# Patient Record
Sex: FEMALE | Race: WHITE | NOT HISPANIC OR LATINO | Employment: UNEMPLOYED | URBAN - METROPOLITAN AREA
[De-identification: names, ages, dates, MRNs, and addresses within clinical notes are randomized per-mention and may not be internally consistent; named-entity substitution may affect disease eponyms.]

---

## 2017-08-01 ENCOUNTER — LAB (OUTPATIENT)
Dept: LAB | Facility: HOSPITAL | Age: 22
End: 2017-08-01

## 2017-08-01 ENCOUNTER — TRANSCRIBE ORDERS (OUTPATIENT)
Dept: ADMINISTRATIVE | Facility: HOSPITAL | Age: 22
End: 2017-08-01

## 2017-08-01 DIAGNOSIS — J45.909 INTRINSIC ASTHMA, UNSPECIFIED: ICD-10-CM

## 2017-08-01 DIAGNOSIS — J45.20 ASTHMA, MILD INTERMITTENT, POORLY CONTROLLED: Primary | ICD-10-CM

## 2017-08-01 DIAGNOSIS — J45.20 ASTHMA, MILD INTERMITTENT, POORLY CONTROLLED: ICD-10-CM

## 2017-08-01 LAB
ANION GAP SERPL CALCULATED.3IONS-SCNC: 16.6 MMOL/L
BUN BLD-MCNC: 18 MG/DL (ref 6–20)
BUN/CREAT SERPL: 20.9 (ref 7–25)
CALCIUM SPEC-SCNC: 9.7 MG/DL (ref 8.6–10.5)
CHLORIDE SERPL-SCNC: 104 MMOL/L (ref 98–107)
CO2 SERPL-SCNC: 25.4 MMOL/L (ref 22–29)
CREAT BLD-MCNC: 0.86 MG/DL (ref 0.57–1)
GFR SERPL CREATININE-BSD FRML MDRD: 82 ML/MIN/1.73
GLUCOSE BLD-MCNC: 125 MG/DL (ref 65–99)
POTASSIUM BLD-SCNC: 4.3 MMOL/L (ref 3.5–5.2)
SODIUM BLD-SCNC: 146 MMOL/L (ref 136–145)
VALPROATE SERPL-MCNC: 46 MCG/ML (ref 50–125)

## 2017-08-01 PROCEDURE — 36415 COLL VENOUS BLD VENIPUNCTURE: CPT

## 2017-08-01 PROCEDURE — 80164 ASSAY DIPROPYLACETIC ACD TOT: CPT | Performed by: INTERNAL MEDICINE

## 2017-08-01 PROCEDURE — 80048 BASIC METABOLIC PNL TOTAL CA: CPT | Performed by: INTERNAL MEDICINE

## 2017-11-14 ENCOUNTER — LAB (OUTPATIENT)
Dept: LAB | Facility: HOSPITAL | Age: 22
End: 2017-11-14

## 2017-11-14 ENCOUNTER — APPOINTMENT (OUTPATIENT)
Dept: LAB | Facility: HOSPITAL | Age: 22
End: 2017-11-14

## 2017-11-14 ENCOUNTER — TRANSCRIBE ORDERS (OUTPATIENT)
Dept: ADMINISTRATIVE | Facility: HOSPITAL | Age: 22
End: 2017-11-14

## 2017-11-14 DIAGNOSIS — I11.0 BENIGN HYPERTENSIVE HEART DISEASE WITH CONGESTIVE HEART FAILURE (HCC): Primary | ICD-10-CM

## 2017-11-14 DIAGNOSIS — R00.0 TACHYCARDIA: ICD-10-CM

## 2017-11-14 DIAGNOSIS — I11.0 BENIGN HYPERTENSIVE HEART DISEASE WITH CONGESTIVE HEART FAILURE (HCC): ICD-10-CM

## 2017-11-14 LAB
D DIMER PPP FEU-MCNC: <0.27 MCGFEU/ML (ref 0–0.49)
NT-PROBNP SERPL-MCNC: 35.2 PG/ML (ref 5–450)

## 2017-11-14 PROCEDURE — 85379 FIBRIN DEGRADATION QUANT: CPT | Performed by: INTERNAL MEDICINE

## 2017-11-14 PROCEDURE — 36415 COLL VENOUS BLD VENIPUNCTURE: CPT

## 2017-11-14 PROCEDURE — 83880 ASSAY OF NATRIURETIC PEPTIDE: CPT | Performed by: INTERNAL MEDICINE

## 2018-10-23 ENCOUNTER — LAB (OUTPATIENT)
Dept: LAB | Facility: HOSPITAL | Age: 23
End: 2018-10-23

## 2018-10-23 ENCOUNTER — TRANSCRIBE ORDERS (OUTPATIENT)
Dept: ADMINISTRATIVE | Facility: HOSPITAL | Age: 23
End: 2018-10-23

## 2018-10-23 DIAGNOSIS — D64.9 ANEMIA, UNSPECIFIED TYPE: ICD-10-CM

## 2018-10-23 DIAGNOSIS — C03.9 MALIGNANT NEOPLASM OF GUM (HCC): ICD-10-CM

## 2018-10-23 DIAGNOSIS — D64.9 ANEMIA, UNSPECIFIED TYPE: Primary | ICD-10-CM

## 2018-10-23 LAB
BASOPHILS # BLD AUTO: 0.06 10*3/MM3 (ref 0–0.2)
BASOPHILS NFR BLD AUTO: 1 % (ref 0–1.5)
DEPRECATED RDW RBC AUTO: 39 FL (ref 37–54)
EOSINOPHIL # BLD AUTO: 0.02 10*3/MM3 (ref 0–0.7)
EOSINOPHIL NFR BLD AUTO: 0.3 % (ref 0.3–6.2)
ERYTHROCYTE [DISTWIDTH] IN BLOOD BY AUTOMATED COUNT: 12 % (ref 11.7–13)
FERRITIN SERPL-MCNC: 37.21 NG/ML (ref 13–150)
FOLATE SERPL-MCNC: >20 NG/ML (ref 4.78–24.2)
HCT VFR BLD AUTO: 45.3 % (ref 35.6–45.5)
HGB BLD-MCNC: 15.5 G/DL (ref 11.9–15.5)
IMM GRANULOCYTES # BLD: 0.01 10*3/MM3 (ref 0–0.03)
IMM GRANULOCYTES NFR BLD: 0.2 % (ref 0–0.5)
IRON 24H UR-MRATE: 80 MCG/DL (ref 37–145)
LYMPHOCYTES # BLD AUTO: 2.17 10*3/MM3 (ref 0.9–4.8)
LYMPHOCYTES NFR BLD AUTO: 35.2 % (ref 19.6–45.3)
MCH RBC QN AUTO: 30.3 PG (ref 26.9–32)
MCHC RBC AUTO-ENTMCNC: 34.2 G/DL (ref 32.4–36.3)
MCV RBC AUTO: 88.5 FL (ref 80.5–98.2)
MONOCYTES # BLD AUTO: 0.59 10*3/MM3 (ref 0.2–1.2)
MONOCYTES NFR BLD AUTO: 9.6 % (ref 5–12)
NEUTROPHILS # BLD AUTO: 3.32 10*3/MM3 (ref 1.9–8.1)
NEUTROPHILS NFR BLD AUTO: 53.9 % (ref 42.7–76)
PLATELET # BLD AUTO: 290 10*3/MM3 (ref 140–500)
PMV BLD AUTO: 11.8 FL (ref 6–12)
RBC # BLD AUTO: 5.12 10*6/MM3 (ref 3.9–5.2)
T3 SERPL-MCNC: 114.7 NG/DL (ref 80–200)
T4 FREE SERPL-MCNC: 1.76 NG/DL (ref 0.93–1.7)
TSH SERPL DL<=0.05 MIU/L-ACNC: 0.84 MIU/ML (ref 0.27–4.2)
VIT B12 BLD-MCNC: 384 PG/ML (ref 211–946)
WBC NRBC COR # BLD: 6.16 10*3/MM3 (ref 4.5–10.7)

## 2018-10-23 PROCEDURE — 84443 ASSAY THYROID STIM HORMONE: CPT | Performed by: INTERNAL MEDICINE

## 2018-10-23 PROCEDURE — 84480 ASSAY TRIIODOTHYRONINE (T3): CPT | Performed by: INTERNAL MEDICINE

## 2018-10-23 PROCEDURE — 82607 VITAMIN B-12: CPT | Performed by: INTERNAL MEDICINE

## 2018-10-23 PROCEDURE — 82746 ASSAY OF FOLIC ACID SERUM: CPT | Performed by: INTERNAL MEDICINE

## 2018-10-23 PROCEDURE — 36415 COLL VENOUS BLD VENIPUNCTURE: CPT

## 2018-10-23 PROCEDURE — 85025 COMPLETE CBC W/AUTO DIFF WBC: CPT | Performed by: INTERNAL MEDICINE

## 2018-10-23 PROCEDURE — 83540 ASSAY OF IRON: CPT | Performed by: INTERNAL MEDICINE

## 2018-10-23 PROCEDURE — 82728 ASSAY OF FERRITIN: CPT | Performed by: INTERNAL MEDICINE

## 2018-10-23 PROCEDURE — 84439 ASSAY OF FREE THYROXINE: CPT | Performed by: INTERNAL MEDICINE

## 2023-10-08 ENCOUNTER — HOSPITAL ENCOUNTER (EMERGENCY)
Facility: HOSPITAL | Age: 28
Discharge: HOME | End: 2023-10-09
Attending: EMERGENCY MEDICINE | Admitting: EMERGENCY MEDICINE
Payer: COMMERCIAL

## 2023-10-08 DIAGNOSIS — F41.8 ANXIETY ABOUT HEALTH: Primary | ICD-10-CM

## 2023-10-08 PROCEDURE — 99282 EMERGENCY DEPT VISIT SF MDM: CPT

## 2023-10-09 NOTE — ED NOTES
Dr. Hankins entered the room to examine the patient. She started to act weird after he did a normal routine checkup. He listened to her lungs and heart, palpated her abdomen, and asked her several questions about her visit here. He stated that we could do a urinalysis or a pelvic exam to get the samples needed to be sent out to test for STD's. That is what the patient requested when I triaged her in the room. She decided that she wanted to go somewhere else to have services provided. She then weirdly asked for the Doctor's name and her explaine to her that it would be on her discharge paperwork.
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
## 2023-11-02 ENCOUNTER — APPOINTMENT (OUTPATIENT)
Dept: CT IMAGING | Facility: HOSPITAL | Age: 28
End: 2023-11-02
Payer: OTHER MISCELLANEOUS

## 2023-11-02 ENCOUNTER — HOSPITAL ENCOUNTER (EMERGENCY)
Facility: HOSPITAL | Age: 28
Discharge: HOME | End: 2023-11-02
Attending: EMERGENCY MEDICINE
Payer: OTHER MISCELLANEOUS

## 2023-11-02 VITALS
OXYGEN SATURATION: 98 % | DIASTOLIC BLOOD PRESSURE: 80 MMHG | TEMPERATURE: 98.8 F | BODY MASS INDEX: 19.99 KG/M2 | SYSTOLIC BLOOD PRESSURE: 136 MMHG | HEART RATE: 81 BPM | RESPIRATION RATE: 16 BRPM | WEIGHT: 120 LBS | HEIGHT: 65 IN

## 2023-11-02 DIAGNOSIS — M54.2 ACUTE NECK PAIN: ICD-10-CM
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
## 2023-11-02 DIAGNOSIS — S09.90XA INJURY OF HEAD, INITIAL ENCOUNTER: ICD-10-CM

## 2023-11-02 PROCEDURE — 99284 EMERGENCY DEPT VISIT MOD MDM: CPT

## 2023-11-02 PROCEDURE — 70450 CT HEAD/BRAIN W/O DYE: CPT

## 2023-11-02 PROCEDURE — 72125 CT NECK SPINE W/O DYE: CPT
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
## 2024-08-30 ENCOUNTER — HOSPITAL ENCOUNTER (EMERGENCY)
Facility: HOSPITAL | Age: 29
Discharge: HOME | End: 2024-08-30
Attending: EMERGENCY MEDICINE
Payer: COMMERCIAL

## 2024-08-30 VITALS
HEIGHT: 65 IN | RESPIRATION RATE: 16 BRPM | TEMPERATURE: 98.4 F | WEIGHT: 115 LBS | HEART RATE: 76 BPM | BODY MASS INDEX: 19.16 KG/M2 | DIASTOLIC BLOOD PRESSURE: 82 MMHG | SYSTOLIC BLOOD PRESSURE: 102 MMHG | OXYGEN SATURATION: 98 %

## 2024-08-30 DIAGNOSIS — Z71.1 CONCERN ABOUT DISEASE WITHOUT DIAGNOSIS: Primary | ICD-10-CM

## 2024-08-30 DIAGNOSIS — Z86.59 HISTORY OF ANXIETY: ICD-10-CM

## 2024-08-30 LAB — GLUCOSE BLDC GLUCOMTR-MCNC: 111 MG/DL (ref 70–130)

## 2024-08-30 PROCEDURE — 82948 REAGENT STRIP/BLOOD GLUCOSE: CPT

## 2024-08-30 PROCEDURE — 99282 EMERGENCY DEPT VISIT SF MDM: CPT

## 2024-08-30 NOTE — ED PROVIDER NOTES
"Subjective   History of Present Illness  Pt is a 30 yo female presenting to ED with complaints of concern for diabetes. PMHx significant for Anxiety and Depression. PT came to ED requesting to be tested for diabetes. She explains \"I don't eat healthy\". She reports she believes she has heart disease and high cholesterol and wants to be tested for those all of those. She denies dizziness, CP, SOB, fever, N/V/D, abdominal pain or urinary sx. She explains she has been off her \"mental\"meds for months. She uses ETOH and marijuana.     History provided by:  Patient and medical records      Review of Systems   Constitutional:  Negative for fever.   HENT:  Negative for congestion.    Eyes:  Negative for visual disturbance.   Respiratory:  Negative for cough and shortness of breath.    Cardiovascular:  Negative for chest pain.   Gastrointestinal:  Negative for abdominal pain, diarrhea, nausea and vomiting.   Genitourinary:  Negative for difficulty urinating, dysuria, flank pain and frequency.   Musculoskeletal:  Negative for arthralgias and back pain.   Neurological:  Negative for dizziness, weakness, numbness and headaches.   Psychiatric/Behavioral:  Negative for confusion.        Past Medical History:   Diagnosis Date    Asthma        No Known Allergies    Past Surgical History:   Procedure Laterality Date    TONSILLECTOMY         No family history on file.    Social History     Socioeconomic History    Marital status: Single   Tobacco Use    Smoking status: Never    Smokeless tobacco: Never   Substance and Sexual Activity    Alcohol use: No           Objective   Physical Exam  Vitals and nursing note reviewed.   Constitutional:       General: She is not in acute distress.  HENT:      Head: Atraumatic.   Eyes:      Extraocular Movements: Extraocular movements intact.      Conjunctiva/sclera: Conjunctivae normal.   Cardiovascular:      Rate and Rhythm: Normal rate.      Heart sounds: Normal heart sounds.   Pulmonary:      " "Effort: Pulmonary effort is normal. No respiratory distress.   Abdominal:      Palpations: Abdomen is soft.      Tenderness: There is no abdominal tenderness.   Musculoskeletal:         General: Normal range of motion.      Cervical back: Normal range of motion and neck supple.   Skin:     General: Skin is warm.   Neurological:      Mental Status: She is alert and oriented to person, place, and time.      Sensory: No sensory deficit.      Motor: No weakness.   Psychiatric:         Mood and Affect: Mood is anxious.         Speech: Speech normal.         Behavior: Behavior is cooperative.         Procedures           ED Course                        Recent Results (from the past 24 hour(s))   POC Glucose Once    Collection Time: 08/30/24  3:53 PM    Specimen: Blood   Result Value Ref Range    Glucose 111 70 - 130 mg/dL     Note: In addition to lab results from this visit, the labs listed above may include labs taken at another facility or during a different encounter within the last 24 hours. Please correlate lab times with ED admission and discharge times for further clarification of the services performed during this visit.    No orders to display     Vitals:    08/30/24 1549   BP: 102/82   BP Location: Left arm   Patient Position: Sitting   Pulse: 76   Resp: 16   Temp: 98.4 °F (36.9 °C)   TempSrc: Oral   SpO2: 98%   Weight: 52.2 kg (115 lb)   Height: 165.1 cm (65\")     Medications - No data to display  ECG/EMG Results (last 24 hours)       ** No results found for the last 24 hours. **          No orders to display       DISCHARGE    Patient discharged in stable condition.    Reviewed implications of results, diagnosis, meds, responsibility to follow up, warning signs and symptoms of possible worsening, potential complications and reasons to return to ER.    Patient/Family voiced understanding of above instructions.    Discussed plan for discharge, as there is no emergent indication for admission.  Pt/family is " agreeable and understands need for follow up and possible repeat testing.  Pt/family is aware that discharge does not mean that nothing is wrong but that it indicates no emergency is currently present that requires admission and they must continue care with follow-up as given below or with a physician of their choice.     FOLLOW-UP  Fawn Peraza APRN  [address] [facility] [address]  [facility] [address]  [phone]    Schedule an appointment as soon as possible for a visit       [facility] EMERGENCY DEPARTMENT  [address] [address] [phone]    If symptoms worsen         Medication List      No changes were made to your prescriptions during this visit.                              Medical Decision Making  Pt is a 28 yo female presenting to ED with complaints of concern for diabetes and heart diseaes. She has no symptoms but states she does not eat healthy. Glucose 111 in ED. Counseled on healthy diet and cessation from ETOH and marijuana use. She needs to f/u with PCP.     DDx  Drug use, DKA, Hyperglycemia, CAD    Problems Addressed:  Concern about disease without diagnosis: acute illness or injury  History of anxiety: acute illness or injury    Amount and/or Complexity of Data Reviewed  External Data Reviewed: notes.     Details: Reviewed previous non ED visits including prior labs, imaging, available notes, medications, allergies and surgical hx.     Labs:  Decision-making details documented in ED Course.        Final diagnoses:   Concern about disease without diagnosis   History of anxiety       ED Disposition  ED Disposition       ED Disposition   Discharge    Condition   Stable    Comment   --               Fawn Peraza APRN  [address] [facility] [address]  [place] [id number] [phone]    Schedule an appointment as soon as possible for a visit       [facility] EMERGENCY DEPARTMENT  [address] [facility]  [id number] [phone]    If symptoms worsen         Medication List      No changes were made to your prescriptions during this visit.            Annie Guillen PA  08/30/24 2021

## 2024-09-18 ENCOUNTER — HOSPITAL ENCOUNTER (EMERGENCY)
Facility: HOSPITAL | Age: 29
Discharge: HOME | End: 2024-09-19
Attending: EMERGENCY MEDICINE
Payer: COMMERCIAL

## 2024-09-18 ENCOUNTER — APPOINTMENT (OUTPATIENT)
Dept: GENERAL RADIOLOGY | Facility: HOSPITAL | Age: 29
End: 2024-09-18
Payer: COMMERCIAL

## 2024-09-18 DIAGNOSIS — R00.2 PALPITATIONS: Primary | ICD-10-CM

## 2024-09-18 LAB
ALBUMIN SERPL-MCNC: 4.6 G/DL (ref 3.5–5.2)
ALBUMIN/GLOB SERPL: 1.8 G/DL
ALP SERPL-CCNC: 56 U/L (ref 39–117)
ALT SERPL W P-5'-P-CCNC: 12 U/L (ref 1–33)
ANION GAP SERPL CALCULATED.3IONS-SCNC: 11 MMOL/L (ref 5–15)
AST SERPL-CCNC: 19 U/L (ref 1–32)
BASOPHILS # BLD AUTO: 0.07 10*3/MM3 (ref 0–0.2)
BASOPHILS NFR BLD AUTO: 0.8 % (ref 0–1.5)
BILIRUB SERPL-MCNC: 0.4 MG/DL (ref 0–1.2)
BUN SERPL-MCNC: 12 MG/DL (ref 6–20)
BUN/CREAT SERPL: 16.2 (ref 7–25)
CALCIUM SPEC-SCNC: 9.7 MG/DL (ref 8.6–10.5)
CHLORIDE SERPL-SCNC: 103 MMOL/L (ref 98–107)
CO2 SERPL-SCNC: 23 MMOL/L (ref 22–29)
CREAT SERPL-MCNC: 0.74 MG/DL (ref 0.57–1)
DEPRECATED RDW RBC AUTO: 38.9 FL (ref 37–54)
EGFRCR SERPLBLD CKD-EPI 2021: 111.8 ML/MIN/1.73
EOSINOPHIL # BLD AUTO: 0.09 10*3/MM3 (ref 0–0.4)
EOSINOPHIL NFR BLD AUTO: 1 % (ref 0.3–6.2)
ERYTHROCYTE [DISTWIDTH] IN BLOOD BY AUTOMATED COUNT: 11.8 % (ref 12.3–15.4)
GLOBULIN UR ELPH-MCNC: 2.5 GM/DL
GLUCOSE SERPL-MCNC: 92 MG/DL (ref 65–99)
HCT VFR BLD AUTO: 43.6 % (ref 34–46.6)
HGB BLD-MCNC: 14.9 G/DL (ref 12–15.9)
HOLD SPECIMEN: NORMAL
IMM GRANULOCYTES # BLD AUTO: 0.02 10*3/MM3 (ref 0–0.05)
IMM GRANULOCYTES NFR BLD AUTO: 0.2 % (ref 0–0.5)
LYMPHOCYTES # BLD AUTO: 2.23 10*3/MM3 (ref 0.7–3.1)
LYMPHOCYTES NFR BLD AUTO: 25.1 % (ref 19.6–45.3)
MCH RBC QN AUTO: 31 PG (ref 26.6–33)
MCHC RBC AUTO-ENTMCNC: 34.2 G/DL (ref 31.5–35.7)
MCV RBC AUTO: 90.8 FL (ref 79–97)
MONOCYTES # BLD AUTO: 0.75 10*3/MM3 (ref 0.1–0.9)
MONOCYTES NFR BLD AUTO: 8.4 % (ref 5–12)
NEUTROPHILS NFR BLD AUTO: 5.72 10*3/MM3 (ref 1.7–7)
NEUTROPHILS NFR BLD AUTO: 64.5 % (ref 42.7–76)
NRBC BLD AUTO-RTO: 0 /100 WBC (ref 0–0.2)
PLATELET # BLD AUTO: 256 10*3/MM3 (ref 140–450)
PMV BLD AUTO: 10.8 FL (ref 6–12)
POTASSIUM SERPL-SCNC: 3.9 MMOL/L (ref 3.5–5.2)
PROT SERPL-MCNC: 7.1 G/DL (ref 6–8.5)
RBC # BLD AUTO: 4.8 10*6/MM3 (ref 3.77–5.28)
SODIUM SERPL-SCNC: 137 MMOL/L (ref 136–145)
TROPONIN T SERPL HS-MCNC: <6 NG/L
WBC NRBC COR # BLD AUTO: 8.88 10*3/MM3 (ref 3.4–10.8)
WHOLE BLOOD HOLD COAG: NORMAL
WHOLE BLOOD HOLD SPECIMEN: NORMAL

## 2024-09-18 PROCEDURE — 93005 ELECTROCARDIOGRAM TRACING: CPT | Performed by: EMERGENCY MEDICINE

## 2024-09-18 PROCEDURE — 93010 ELECTROCARDIOGRAM REPORT: CPT | Performed by: INTERNAL MEDICINE

## 2024-09-18 PROCEDURE — 83735 ASSAY OF MAGNESIUM: CPT | Performed by: PHYSICIAN ASSISTANT

## 2024-09-18 PROCEDURE — 36415 COLL VENOUS BLD VENIPUNCTURE: CPT

## 2024-09-18 PROCEDURE — 85025 COMPLETE CBC W/AUTO DIFF WBC: CPT | Performed by: EMERGENCY MEDICINE

## 2024-09-18 PROCEDURE — 80053 COMPREHEN METABOLIC PANEL: CPT | Performed by: EMERGENCY MEDICINE

## 2024-09-18 PROCEDURE — 99284 EMERGENCY DEPT VISIT MOD MDM: CPT

## 2024-09-18 PROCEDURE — 84484 ASSAY OF TROPONIN QUANT: CPT | Performed by: EMERGENCY MEDICINE

## 2024-09-18 PROCEDURE — 84439 ASSAY OF FREE THYROXINE: CPT | Performed by: PHYSICIAN ASSISTANT

## 2024-09-18 PROCEDURE — 71045 X-RAY EXAM CHEST 1 VIEW: CPT

## 2024-09-18 PROCEDURE — 93005 ELECTROCARDIOGRAM TRACING: CPT

## 2024-09-18 PROCEDURE — 96360 HYDRATION IV INFUSION INIT: CPT

## 2024-09-18 PROCEDURE — 84443 ASSAY THYROID STIM HORMONE: CPT | Performed by: PHYSICIAN ASSISTANT

## 2024-09-18 RX ORDER — SODIUM CHLORIDE 0.9 % (FLUSH) 0.9 %
10 SYRINGE (ML) INJECTION AS NEEDED
Status: DISCONTINUED | OUTPATIENT
Start: 2024-09-18 | End: 2024-09-19 | Stop reason: HOSPADM

## 2024-09-18 RX ORDER — ASPIRIN 325 MG
325 TABLET ORAL ONCE
Status: DISCONTINUED | OUTPATIENT
Start: 2024-09-18 | End: 2024-09-19 | Stop reason: HOSPADM

## 2024-09-19 VITALS
SYSTOLIC BLOOD PRESSURE: 96 MMHG | BODY MASS INDEX: 19.49 KG/M2 | WEIGHT: 117 LBS | HEIGHT: 65 IN | RESPIRATION RATE: 18 BRPM | OXYGEN SATURATION: 100 % | HEART RATE: 61 BPM | TEMPERATURE: 98 F | DIASTOLIC BLOOD PRESSURE: 69 MMHG

## 2024-09-19 LAB
GEN 5 2HR TROPONIN T REFLEX: <6 NG/L
MAGNESIUM SERPL-MCNC: 2.3 MG/DL (ref 1.6–2.6)
QT INTERVAL: 410 MS
QTC INTERVAL: 449 MS
T4 FREE SERPL-MCNC: 1.45 NG/DL (ref 0.92–1.68)
TROPONIN T DELTA: NORMAL
TSH SERPL DL<=0.05 MIU/L-ACNC: 3.92 UIU/ML (ref 0.27–4.2)

## 2024-09-19 PROCEDURE — 96361 HYDRATE IV INFUSION ADD-ON: CPT

## 2024-09-19 PROCEDURE — 96360 HYDRATION IV INFUSION INIT: CPT

## 2024-09-19 PROCEDURE — 25810000003 LACTATED RINGERS SOLUTION: Performed by: PHYSICIAN ASSISTANT

## 2024-09-19 PROCEDURE — 84484 ASSAY OF TROPONIN QUANT: CPT | Performed by: EMERGENCY MEDICINE

## 2024-09-19 RX ADMIN — SODIUM CHLORIDE, POTASSIUM CHLORIDE, SODIUM LACTATE AND CALCIUM CHLORIDE 1000 ML: 600; 310; 30; 20 INJECTION, SOLUTION INTRAVENOUS at 00:15

## 2024-09-24 ENCOUNTER — HOSPITAL ENCOUNTER (INPATIENT)
Facility: HOSPITAL | Age: 29
LOS: 7 days | Discharge: HOME | DRG: 882 | End: 2024-10-01
Attending: STUDENT IN AN ORGANIZED HEALTH CARE EDUCATION/TRAINING PROGRAM | Admitting: STUDENT IN AN ORGANIZED HEALTH CARE EDUCATION/TRAINING PROGRAM
Payer: COMMERCIAL

## 2024-09-24 ENCOUNTER — HOSPITAL ENCOUNTER (EMERGENCY)
Facility: HOSPITAL | Age: 29
Discharge: PSYCHIATRIC OR SUBSTANCE USE FACILITY | DRG: 882 | End: 2024-09-24
Attending: STUDENT IN AN ORGANIZED HEALTH CARE EDUCATION/TRAINING PROGRAM | Admitting: STUDENT IN AN ORGANIZED HEALTH CARE EDUCATION/TRAINING PROGRAM
Payer: COMMERCIAL

## 2024-09-24 VITALS
TEMPERATURE: 99 F | RESPIRATION RATE: 18 BRPM | OXYGEN SATURATION: 99 % | HEART RATE: 79 BPM | WEIGHT: 117 LBS | SYSTOLIC BLOOD PRESSURE: 101 MMHG | HEIGHT: 65 IN | BODY MASS INDEX: 19.49 KG/M2 | DIASTOLIC BLOOD PRESSURE: 77 MMHG

## 2024-09-24 DIAGNOSIS — F99 PSYCHIATRIC ILLNESS: ICD-10-CM

## 2024-09-24 DIAGNOSIS — F22 PARANOIA: Primary | ICD-10-CM

## 2024-09-24 LAB
AMPHET+METHAMPHET UR QL: NEGATIVE
AMPHETAMINES UR QL: NEGATIVE
ANION GAP SERPL CALCULATED.3IONS-SCNC: 12.7 MMOL/L (ref 5–15)
APAP SERPL-MCNC: <5 MCG/ML (ref 0–30)
B-HCG UR QL: NEGATIVE
BACTERIA UR QL AUTO: ABNORMAL /HPF
BARBITURATES UR QL SCN: NEGATIVE
BASOPHILS # BLD AUTO: 0.04 10*3/MM3 (ref 0–0.2)
BASOPHILS NFR BLD AUTO: 0.8 % (ref 0–1.5)
BENZODIAZ UR QL SCN: NEGATIVE
BILIRUB UR QL STRIP: NEGATIVE
BUN SERPL-MCNC: 12 MG/DL (ref 6–20)
BUN/CREAT SERPL: 14.6 (ref 7–25)
BUPRENORPHINE SERPL-MCNC: NEGATIVE NG/ML
CALCIUM SPEC-SCNC: 9.8 MG/DL (ref 8.6–10.5)
CANNABINOIDS SERPL QL: NEGATIVE
CHLORIDE SERPL-SCNC: 99 MMOL/L (ref 98–107)
CLARITY UR: CLEAR
CO2 SERPL-SCNC: 25.3 MMOL/L (ref 22–29)
COCAINE UR QL: NEGATIVE
COLOR UR: YELLOW
CREAT SERPL-MCNC: 0.82 MG/DL (ref 0.57–1)
DEPRECATED RDW RBC AUTO: 38.2 FL (ref 37–54)
EGFRCR SERPLBLD CKD-EPI 2021: 98.8 ML/MIN/1.73
EOSINOPHIL # BLD AUTO: 0.03 10*3/MM3 (ref 0–0.4)
EOSINOPHIL NFR BLD AUTO: 0.6 % (ref 0.3–6.2)
ERYTHROCYTE [DISTWIDTH] IN BLOOD BY AUTOMATED COUNT: 11.7 % (ref 12.3–15.4)
ETHANOL BLD-MCNC: <10 MG/DL (ref 0–10)
ETHANOL UR QL: <0.01 %
GLUCOSE SERPL-MCNC: 141 MG/DL (ref 65–99)
GLUCOSE UR STRIP-MCNC: NEGATIVE MG/DL
HCT VFR BLD AUTO: 43.6 % (ref 34–46.6)
HGB BLD-MCNC: 14.8 G/DL (ref 12–15.9)
HGB UR QL STRIP.AUTO: ABNORMAL
HYALINE CASTS UR QL AUTO: ABNORMAL /LPF
IMM GRANULOCYTES # BLD AUTO: 0.01 10*3/MM3 (ref 0–0.05)
IMM GRANULOCYTES NFR BLD AUTO: 0.2 % (ref 0–0.5)
KETONES UR QL STRIP: ABNORMAL
LEUKOCYTE ESTERASE UR QL STRIP.AUTO: NEGATIVE
LYMPHOCYTES # BLD AUTO: 1.91 10*3/MM3 (ref 0.7–3.1)
LYMPHOCYTES NFR BLD AUTO: 39.4 % (ref 19.6–45.3)
MCH RBC QN AUTO: 30.5 PG (ref 26.6–33)
MCHC RBC AUTO-ENTMCNC: 33.9 G/DL (ref 31.5–35.7)
MCV RBC AUTO: 89.7 FL (ref 79–97)
METHADONE UR QL SCN: NEGATIVE
MONOCYTES # BLD AUTO: 0.37 10*3/MM3 (ref 0.1–0.9)
MONOCYTES NFR BLD AUTO: 7.6 % (ref 5–12)
NEUTROPHILS NFR BLD AUTO: 2.49 10*3/MM3 (ref 1.7–7)
NEUTROPHILS NFR BLD AUTO: 51.4 % (ref 42.7–76)
NITRITE UR QL STRIP: NEGATIVE
NRBC BLD AUTO-RTO: 0 /100 WBC (ref 0–0.2)
OPIATES UR QL: NEGATIVE
OXYCODONE UR QL SCN: NEGATIVE
PCP UR QL SCN: NEGATIVE
PH UR STRIP.AUTO: 6 [PH] (ref 4.5–8)
PLATELET # BLD AUTO: 247 10*3/MM3 (ref 140–450)
PMV BLD AUTO: 10.8 FL (ref 6–12)
POTASSIUM SERPL-SCNC: 3.7 MMOL/L (ref 3.5–5.2)
PROT UR QL STRIP: NEGATIVE
RBC # BLD AUTO: 4.86 10*6/MM3 (ref 3.77–5.28)
RBC # UR STRIP: ABNORMAL /HPF
REF LAB TEST METHOD: ABNORMAL
SALICYLATES SERPL-MCNC: <0.5 MG/DL
SODIUM SERPL-SCNC: 137 MMOL/L (ref 136–145)
SP GR UR STRIP: 1.01 (ref 1–1.03)
SQUAMOUS #/AREA URNS HPF: ABNORMAL /HPF
TRICYCLICS UR QL SCN: NEGATIVE
TSH SERPL DL<=0.05 MIU/L-ACNC: 1.64 UIU/ML (ref 0.27–4.2)
UROBILINOGEN UR QL STRIP: ABNORMAL
WBC # UR STRIP: ABNORMAL /HPF
WBC NRBC COR # BLD AUTO: 4.85 10*3/MM3 (ref 3.4–10.8)

## 2024-09-24 PROCEDURE — 36415 COLL VENOUS BLD VENIPUNCTURE: CPT

## 2024-09-24 PROCEDURE — 81001 URINALYSIS AUTO W/SCOPE: CPT | Performed by: STUDENT IN AN ORGANIZED HEALTH CARE EDUCATION/TRAINING PROGRAM

## 2024-09-24 PROCEDURE — 85025 COMPLETE CBC W/AUTO DIFF WBC: CPT | Performed by: STUDENT IN AN ORGANIZED HEALTH CARE EDUCATION/TRAINING PROGRAM

## 2024-09-24 PROCEDURE — 80306 DRUG TEST PRSMV INSTRMNT: CPT | Performed by: STUDENT IN AN ORGANIZED HEALTH CARE EDUCATION/TRAINING PROGRAM

## 2024-09-24 PROCEDURE — 84443 ASSAY THYROID STIM HORMONE: CPT | Performed by: STUDENT IN AN ORGANIZED HEALTH CARE EDUCATION/TRAINING PROGRAM

## 2024-09-24 PROCEDURE — 81025 URINE PREGNANCY TEST: CPT | Performed by: STUDENT IN AN ORGANIZED HEALTH CARE EDUCATION/TRAINING PROGRAM

## 2024-09-24 PROCEDURE — 82077 ASSAY SPEC XCP UR&BREATH IA: CPT | Performed by: STUDENT IN AN ORGANIZED HEALTH CARE EDUCATION/TRAINING PROGRAM

## 2024-09-24 PROCEDURE — 99285 EMERGENCY DEPT VISIT HI MDM: CPT | Performed by: STUDENT IN AN ORGANIZED HEALTH CARE EDUCATION/TRAINING PROGRAM

## 2024-09-24 PROCEDURE — 80143 DRUG ASSAY ACETAMINOPHEN: CPT | Performed by: STUDENT IN AN ORGANIZED HEALTH CARE EDUCATION/TRAINING PROGRAM

## 2024-09-24 PROCEDURE — 80048 BASIC METABOLIC PNL TOTAL CA: CPT | Performed by: STUDENT IN AN ORGANIZED HEALTH CARE EDUCATION/TRAINING PROGRAM

## 2024-09-24 PROCEDURE — 80179 DRUG ASSAY SALICYLATE: CPT | Performed by: STUDENT IN AN ORGANIZED HEALTH CARE EDUCATION/TRAINING PROGRAM

## 2024-09-24 RX ORDER — HYDROXYZINE HYDROCHLORIDE 25 MG/1
25 TABLET, FILM COATED ORAL EVERY 4 HOURS PRN
Status: DISCONTINUED | OUTPATIENT
Start: 2024-09-24 | End: 2024-10-01 | Stop reason: HOSPADM

## 2024-09-24 RX ORDER — BENZTROPINE MESYLATE 1 MG/ML
1 INJECTION, SOLUTION INTRAMUSCULAR; INTRAVENOUS DAILY PRN
Status: DISCONTINUED | OUTPATIENT
Start: 2024-09-24 | End: 2024-10-01 | Stop reason: HOSPADM

## 2024-09-24 RX ORDER — POLYETHYLENE GLYCOL 3350 17 G/17G
17 POWDER, FOR SOLUTION ORAL DAILY PRN
Status: DISCONTINUED | OUTPATIENT
Start: 2024-09-24 | End: 2024-10-01 | Stop reason: HOSPADM

## 2024-09-24 RX ORDER — ACETAMINOPHEN 325 MG/1
650 TABLET ORAL EVERY 4 HOURS PRN
Status: DISCONTINUED | OUTPATIENT
Start: 2024-09-24 | End: 2024-10-01 | Stop reason: HOSPADM

## 2024-09-24 RX ORDER — BENZTROPINE MESYLATE 1 MG/1
2 TABLET ORAL DAILY PRN
Status: DISCONTINUED | OUTPATIENT
Start: 2024-09-24 | End: 2024-10-01 | Stop reason: HOSPADM

## 2024-09-24 RX ORDER — LOPERAMIDE HCL 2 MG
2 CAPSULE ORAL
Status: DISCONTINUED | OUTPATIENT
Start: 2024-09-24 | End: 2024-10-01 | Stop reason: HOSPADM

## 2024-09-24 RX ORDER — TRAZODONE HYDROCHLORIDE 50 MG/1
50 TABLET, FILM COATED ORAL NIGHTLY PRN
Status: DISCONTINUED | OUTPATIENT
Start: 2024-09-24 | End: 2024-09-27

## 2024-09-24 RX ORDER — ALUMINA, MAGNESIA, AND SIMETHICONE 2400; 2400; 240 MG/30ML; MG/30ML; MG/30ML
15 SUSPENSION ORAL EVERY 6 HOURS PRN
Status: DISCONTINUED | OUTPATIENT
Start: 2024-09-24 | End: 2024-10-01 | Stop reason: HOSPADM

## 2024-09-25 PROBLEM — F29 UNSPECIFIED PSYCHOSIS NOT DUE TO A SUBSTANCE OR KNOWN PHYSIOLOGICAL CONDITION: Status: ACTIVE | Noted: 2024-09-25

## 2024-09-25 PROBLEM — F42.9 OCD (OBSESSIVE COMPULSIVE DISORDER): Status: ACTIVE | Noted: 2024-09-25

## 2024-09-25 PROBLEM — E46 MALNUTRITION: Status: ACTIVE | Noted: 2024-09-25

## 2024-09-25 PROCEDURE — 99223 1ST HOSP IP/OBS HIGH 75: CPT | Performed by: STUDENT IN AN ORGANIZED HEALTH CARE EDUCATION/TRAINING PROGRAM

## 2024-09-25 RX ORDER — FLUVOXAMINE MALEATE 50 MG
50 TABLET ORAL NIGHTLY
Status: DISCONTINUED | OUTPATIENT
Start: 2024-09-25 | End: 2024-09-27

## 2024-09-25 RX ORDER — MULTIPLE VITAMINS W/ MINERALS TAB 9MG-400MCG
1 TAB ORAL DAILY
Status: DISCONTINUED | OUTPATIENT
Start: 2024-09-25 | End: 2024-10-01 | Stop reason: HOSPADM

## 2024-09-25 RX ADMIN — FLUVOXAMINE MALEATE 50 MG: 50 TABLET ORAL at 20:05

## 2024-09-25 RX ADMIN — Medication 1 TABLET: at 18:33

## 2024-09-25 NOTE — SIGNIFICANT NOTE
09/25/24 0945   II.  Community Resources   Which agencies have you been involved with? [facility]   III.  Home Plan Assessment   Housing status Live with family   If homeless, why? n/a   Do you have your own private area/room where you are living? Yes   Living Arrangement Comments Patient reports living with parents in [place], [place].   Will your living arrangements affect your treatment/recovery? No   Financial/Environmental Concerns other (see comments)  (n/a)   IV.  Psychosocial Systems   What made you stop going to school? 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 No   What would you want to do in the future? Unsure.   If not currently employed, when was your last job? [employer]. Patient reports only being employed there for two weeks.   Do you have problems keeping or finding a job?  Yes   Why do you have trouble findng/keeping a job? Patient reports that has difficulty concentrating.   Do you feel you can eventually go back to work? Yes   Source of Income other (see comments)  (Patient reports that they rely on parents financially.)   Do you have friends? No   V.  Family of Origin/ Family Attitudes   Describe how you and your family got along when you were growing up Patient reports that they argued with their parents a lot growing up regarding school work.   Do you get along with all family members now? Yes   What influence did growing up in your family have on you? Unsure.   How do you think your problem(s)/illness affects your family/significant others? Concerns them   Do you think your family or significant others contribute to your problem(s)/illness? Don't know   How does your family or significant others feel about you getting help? Glad;Supportive;Concerned   Who do you want involved in your treatment/family sessions? Unsure at the moment.   VI.  Significant Others - Please document sexual history in the  "History Activity   Do you have any problems in the area of sexuality that need to be discussed? No   VII.  Substance Use and Addictive Behaviors -  Please document drug/alcohol specific details in the History Activity   Do you think you have a problem with drugs, alcohol, gambling or other addictive behaviors? No   When using drugs and/or alcohol, which have you encounterd None   Feelings you have coped with by using drugs and/or alcohol or other addictive bahaviors Depression   When coming off of drugs and/or alcohol have you ever had any hallucinations? No   Have you ever had any periods of being completely drug and/or alcohol free (not counting nicotine)? ? Yes   How long? Patient reports that she has went extended periods without using drugs and alcohol.   What is your favorite drug? marijuana   Patient has attended AA/NA No   Family History of Substance Use none   Reported Type of Substances other (see comments)  (Patient denied family history of substance use.)   Has their use or behaviors had a negative affect on you? Does not apply   Do all of your friends use drugs and/or alcohol?  No   VII.  Anabaptist and Spiritual Orientation   How often did you attend [employer] growing up? Regularly   How often do you attend [employer] now? Never   Do you believe in a Higher Power? No   Do you have any other spiritual or Muslim practices that might help or hurt your treatment and recovery? No   Are there spiritual concerns you feel need addressed during treatment? No   Major Change/Loss/Stressor/Fears unknown cause   Techniques to [place] with Loss/Stress/Change none   What in your life is important to you? Unsure.   IX.   Status   Has patient been in the ? No   X. Other Information   What do you expect from treatment? \"I want to figure out how to move forward and cope with life better.\"   Patient Personal Strengths resilient;resourceful;expressive of emotions   Patient Vulnerabilities history of unsuccessful " "treatment;lacks insight into illness;limited support system;occupational insecurity;adverse childhood experience(s)   Is there anything that will keep you from getting better? \"The chemicals in the house.\"   Determinants    What cultural/environmental/ethnic factors are identified as contributing to the presenting problems? Patient reports not having any friends or hobbies.   What are the factors that effect the patient's emotional level? Patient has a history of unsuccessful treatment.   What are the facotrs that effect your assessment of patient weaknesses? Patient reports that their anxiety impacts their ability to function.   What are the factors the effect your plan for family or living environment involvement? Patient refused to sign a verbal release authorization for parents.   Initial family or living environment contacts made and results Therapist attempted to contact patient's mother, Vanessa, as collateral. Voicemail was left requesting a call back.   Assessment of family attitudes Patient reports that their family is concerned about her well being and encouraged her to seek inpatient treatment for symptoms of paranoia.   Integrated Summary   [facility] Patient is a 29-year-old female admitted to [facility] for concerns of paranoid psychosis. Patient resides in [place], [place] and lives with parents.       "

## 2024-09-25 NOTE — ED NOTES
Anastasia from [facility] here to take patient. Anastasia did assessment on patient and no report needed per Anastasia

## 2024-09-25 NOTE — ED PROVIDER NOTES
Subjective   History of Present Illness  This is a 29-year-old who presents to the emergency department with concern that there are chemicals in the water at home.  The patient is fairly concerned that she is experiencing chemicals and does not want to eat anything or drink anything at home.  She does not want to touch metal fork or utensils at home due to the chemicals that may be found upon.  The patient states that she has been experiencing this for several weeks, does not take any medication but was told once that she has bipolar disorder and she was given lithium which she took for 1 week and then stopped.  Patient denies suicidal or homicidal ideation.      Review of Systems   Constitutional:  Negative for activity change, appetite change, diaphoresis and fatigue.   Psychiatric/Behavioral:  Positive for hallucinations. Negative for self-injury. The patient is nervous/anxious. The patient is not hyperactive.    All other systems reviewed and are negative.      Past Medical History:   Diagnosis Date    Asthma        No Known Allergies    Past Surgical History:   Procedure Laterality Date    TONSILLECTOMY         No family history on file.    Social History     Socioeconomic History    Marital status: Single   Tobacco Use    Smoking status: Never    Smokeless tobacco: Never   Substance and Sexual Activity    Alcohol use: No           Objective   Physical Exam  Vitals and nursing note reviewed.   Constitutional:       General: She is not in acute distress.     Appearance: Normal appearance. She is not ill-appearing, toxic-appearing or diaphoretic.   HENT:      Head: Normocephalic and atraumatic.      Right Ear: Tympanic membrane and external ear normal.      Left Ear: Tympanic membrane and external ear normal.      Nose: Nose normal. No congestion or rhinorrhea.      Mouth/Throat:      Mouth: Mucous membranes are moist.      Pharynx: No oropharyngeal exudate or posterior oropharyngeal erythema.   Eyes:       Conjunctiva/sclera: Conjunctivae normal.      Pupils: Pupils are equal, round, and reactive to light.   Cardiovascular:      Rate and Rhythm: Normal rate and regular rhythm.      Pulses: Normal pulses.   Pulmonary:      Effort: Pulmonary effort is normal. No respiratory distress.      Breath sounds: Normal breath sounds. No stridor. No wheezing, rhonchi or rales.   Chest:      Chest wall: No tenderness.   Abdominal:      General: There is no distension.      Palpations: Abdomen is soft. There is no mass.      Tenderness: There is no guarding or rebound.   Musculoskeletal:         General: No swelling or deformity. Normal range of motion.      Cervical back: Normal range of motion and neck supple. No rigidity or tenderness.   Skin:     General: Skin is warm.      Capillary Refill: Capillary refill takes less than 2 seconds.      Coloration: Skin is not pale.   Neurological:      General: No focal deficit present.      Mental Status: She is alert and oriented to person, place, and time. Mental status is at baseline.   Psychiatric:         Mood and Affect: Mood normal.      Comments: Patient has some obvious paranoia, perseverative about chemicals in the water.  She is fairly tangential about this subject.  Affect otherwise normal.  Denies suicidal homicidal ideation.  No self-harm behavior.         Procedures           ED Course                                             Medical Decision Making    MDM:    Escalation of care including admission/observation considered    - Discussions of management with other providers: Behavioral health unit    - Discussed/reviewed with Radiology regarding test interpretation    - Independent interpretation: Labs    - Additional patient history obtained from: None    - Review of external non-ED record (if available):  Prior Inpt record, Office record, Outpt record, Prior Outpt labs, PCP record, Outside ED record, Other    - Chronic conditions affecting care: See HPI and medical  Hx.    - Social Determinants of health significantly affecting care:  None        Medical Decision Making Discussion:    This is a pleasant 29-year-old who presents with what appears to be some acute paranoia secondary to the patient's concern that she has chemicals in the water and upon the utensils in her home.  She refuses to eat any food at the home due to the chemicals that she is concerned are on her skin or may be poisoning her.  The patient does state that she has some hallucinations and auditory form but does not have any specific voice telling her to hurt herself or someone else.  She does not have visual hallucinations.  Patient does not have a history of taking psychiatric medication with the exception of lithium for 1 week which was given multiple months ago after the patient was told that she may have bipolar disorder.    The patient is medically clear at this time in the emergency department, otherwise well-appearing and has no evidence of significant medical abnormality noted on lab workup.  Behavioral health unit has evaluated the patient does recommend admission, I feel that this is very reasonable and the patient will be admitted to our behavioral health unit here at this facility.    The patient was counseled regarding diagnostic results and treatment plan and patient has indicated understanding of these instructions.      Amount and/or Complexity of Data Reviewed  Labs: ordered.        Final diagnoses:   Paranoia   Psychiatric illness       ED Disposition  ED Disposition       ED Disposition   DC/Transfer to Behavioral Health Condition   Good    Comment   --               No follow-up provider specified.       Medication List      No changes were made to your prescriptions during this visit.            Christiano Dillard,   09/24/24 3233

## 2024-09-25 NOTE — H&P
"    Psychiatry Inpatient Initial Evaluation    Clinician: Rosette Camacho MD      CC: Paranoid thoughts of poisoning    HPI:   Deann Coon is a 29 y.o. female admitted to the [facility] on 9/24/2024. Pt was evaluated by me on 09/25/24.  Patient has a history of multiple previous psychiatric hospitalizations, most recently at [facility] on 8/2024, and per patient's report she has previously received diagnoses of ADHD, OCD, cPTSD, anorexia, and bipolar disorder. Pt presents with severe paranoia about being poisoned.  Patient states that her fear of being poisoned started approximately 1 year ago, when she used paint thinner in the process of repainting a wall in her house.  She started to obsess over the possibility of being harmed by the paint thinner, and is extremely fearful that the paint thinner might have lingered all around the house and may be affecting her food.  She has stopped eating food that is prepared in her house due to fear that she will be poisoned by the paint thinner.  She states that her fear of being poisoned has gotten so great that she is unable to take care of herself, avoids taking showers in the house, and has lost a significant amount of weight due to avoiding \"the chemicals\" in food.  Although she has a history of anorexia, patient reports that this occurred during high school and her recent food restrictions have been solely secondary to her fear of being poisoned by chemicals.  Patient says that she has experienced episodes of intrusive, ruminating, paranoid thoughts in the past, typically related to contamination, but it has never disrupted her life to the extent that it currently is.  Patient states that she is unable to enjoy many activities, isolates away from others, and often feels hopeless due to her constant fear of being poisoned.  Patient is somewhat able to recognize that her fears are unreasonable, but at the same time \"the thoughts are so strong that I get convinced " "that it's the truth\".  Additionally, patient endorses a 4-year history of intermittent auditory hallucinations.  She describes hearing voices inside her head that she does not recognize from real life providing a running commentary on day to day activities.  The voices do not appear to have a gender, and only occur when it is time.  Over the past year, she reports that the voices have become more negative and frightening to her.  She says that they comment on things that might be a threat to her, such as saying that something is poisoned, although they seem to stop short of directly threatening her life for safety.  She denies that the voices have ever given her any commands to harm others or do anything impulsive.  She denies any history of VH or other hallucinatory behavior. Patient states that she was diagnosed with bipolar disorder first during high school and again during her most recent admission at [facility] last month, when she was started on lithium and Risperdal.  She has since stopped taking both medications due to frustration with frequent lab draws on lithium and side effects of dizziness with Risperdal.  She is unsure what symptoms resulted in the diagnosis of bipolar disorder, but suspects that it had something to do with the auditory hallucinations.  She denies any history of prolonged periods with decreased need for sleep, elevated mood, increased energy, flight of ideas, racing thoughts beyond her normal anxiety, pressured speech, impulsivity, grandiosity, and excessive spending.  She reports that her sleep is currently \"really good\", and although she feels like she spends too much of her energy worrying about being poisoned, she feels like she has enough energy to accomplish most of what she wants to do throughout the day.  Patient reports that she is tired of her fears preventing her from living her life, and is open to engaging in therapy and starting new medications to help manage these fears.  1 " "option of a long-acting injectable was proposed, patient politely declines due to her fear of plastics and metals going into her body, but says that she is willing to remain compliant with oral medications that do not require frequent lab monitoring.  She also requests to avoid any medications that would significantly increase her weight, but recognizes that she could currently stand to gain a few pounds back.  She currently denies SI/HI/AVH, and reports that she last heard the voices in her head earlier today.    Per collateral obtained from patient's mother by the unit , patient's paranoia has significantly increased over the past 2 weeks.  Patient had been sleeping on the porch at 1 point because she was so afraid of the chemicals getting on her.    Available medical/psychiatric records reviewed and incorporated into the current document.     Past Psychiatric History:  Previous dx: ADHD, OCD, cPTSD, anorexia, bipolar disorder  IP: Several previous admission, including at least 6 to [facility] (most recently 2023), [facility] 2012. Most recent admission was [facility] 8/2024  OP: None currently  Current psych meds: None; most recently prescribed lithium and Risperdal from [facility] 8/2024. Pt did not like having to have lab monitoring for lithium, and had SE of dizziness/lightheadedness from Risperdal.  Previous med trials: Wellbutrin - somewhat effect for depression \"but probably needed something else with it\"; Lexapro, Zoloft - only had brief trials, unsure of response; Abilify - caused rapid weight gain; propranolol - ineffective for anxiety; Unable to recall response to previous trials of hydroxyzine, Seroquel, Depakote, Cymbalta, Remeron  Suicide attempts: Denies  Trauma: Pt unsure - she reports that she was previously diagnosed with cPTSD but does not fully recall what this stemmed from, and says her remote memory in general is poor    Substance Abuse History:  Reports drinking about 1 bottle of wine ~ 5 " "times/month. Social marijuana use. History of misusing Adderall in her early 20s.  Patient denies any history of nicotine use.  She does not believe that substance abuse is a problem for her at this time.  Admission UDS neg, BAL <10.     Social History:  Childhood: Pt was raised by her biological parents, who remain  to this day. She reports that childhood was \"okay\", but she often argued with parents about school. Started to struggle with managing her emotions in HS.   Marital/family status: Single  Living situation: Lives with parents in [place], [place]  Highest level of education: Completed HS then went to trade school for [employer]  Employment: Unemployed. Struggles to hold down a steady job. Last job was at [employer], only lasted 2 weeks.   Support: Ex-boyfriend, but pt reports that \"I need to stop that with him\"  Legal: Denies any history of legal issues    Family Psychiatric History:   No known history of mental health diagnoses. No known history of substance abuse. No known history of suicide attempts.       History reviewed. No pertinent family history.     No Known Allergies     Past Medical History:   Diagnosis Date    Asthma     Bipolar disorder        Prior to Admission medications    Medication Sig Start Date End Date Taking? Authorizing Provider   albuterol (PROVENTIL HFA;VENTOLIN HFA) 108 (90 BASE) MCG/ACT inhaler Inhale 2 puffs Every 6 (Six) Hours As Needed for wheezing. 11/5/16   Frederic Fuentes DO   azithromycin (ZITHROMAX Z-ELIF) 250 MG tablet Take 2 tablets the first day, then 1 tablet daily for 4 days. 11/5/16   Frederic Fuentes DO   lansoprazole (PREVACID) 30 MG capsule Take 1 capsule by mouth Daily.    Emergency, Nurse Epic, RN   loratadine (CLARITIN) 10 MG tablet Take 1 tablet by mouth daily. 4/5/16   Angel Calderon MD   MethylPREDNISolone (MEDROL, ELIF,) 4 MG tablet Take as directed on package instructions. 11/5/16   Frederic Fuentes DO   mirtazapine (REMERON) 15 MG tablet Take " "15 mg by mouth every night.  Patient not taking: Reported on 9/24/2024    Emergency, Nurse Epic, RN   montelukast (SINGULAIR) 10 MG tablet Take 1 tablet by mouth Every Night.    Emergency, Nurse Epic, RN        Temp:  [97.3 °F (36.3 °C)-99 °F (37.2 °C)] 97.3 °F (36.3 °C)  Heart Rate:  [72-99] 72  Resp:  [16-18] 17  BP: (101-112)/(74-81) 110/79      Mental Status Exam  Behavior: Cooperative, appropriate eye contact, holding L hand in front of her mouth as she speaks  Psychomotor activity: Calm, no involuntary movements observed  Orientation: x4  Mood: \"afraid\"  Affect: mood congruent, constricted, anxious  Thought Process: linear, organized, goal-directed  Thought Content: Denies current AVH but reports that she has been experiencing near-daily AH heard inside her head with malicious intent but no clear direct threats or commands, last heard earlier today; no RIS observed during interview; obsessive, paranoid but non-bizarre ruminations about areas/items being contaminated by chemicals  Concentration: Fair  Suicidal Ideation: Denies  Homicidal Ideation: Denies  Insight: Limited  Judgement: Limited      Review of Systems   Constitutional:  Positive for activity change, appetite change and unexpected weight change.   HENT: Negative.     Respiratory: Negative.     Cardiovascular: Negative.    Gastrointestinal: Negative.    Genitourinary: Negative.    Musculoskeletal: Negative.    Skin: Negative.    Neurological: Negative.    Psychiatric/Behavioral:  Positive for behavioral problems, dysphoric mood and hallucinations. Negative for sleep disturbance and suicidal ideas. The patient is nervous/anxious.         PHYSICAL EXAM:  General Appearance:    Alert, cooperative, in no acute distress, lying in bed   Head:    Normocephalic, without obvious abnormality, atraumatic   Eyes:            Lids and lashes normal, conjunctivae and sclerae normal, PERRLA   Ears:    Ears appear intact with no abnormalities noted other than mildly " stretched earlobes due to removed gauges   Throat:   No oral lesions, no thrush, oral mucosa moist   Neck:   No adenopathy, supple, trachea midline, no thyromegaly   Back:     No tenderness to percussion or  palpation, range of motion normal   Lungs:     Clear to auscultation, respirations regular, even and unlabored    Heart:    Regular rhythm and normal rate, normal S1 and S2   Abdomen:     Normal bowel sounds, soft, nontender, nondistended, no guarding, no rebound tenderness   Extremities:   Moves all extremities well, no edema, no cyanosis, no          redness   Skin:   No bleeding, bruising or rash   Lymph nodes:   No palpable adenopathy   Neurologic: AAOx4. No involuntary movements observed. Coordination grossly intact.  Gait stable and steady. Moves all extremities without difficulty. Able to follow complex commands.     CN I:    Sense of smell grossly intact  CN II:               Pupils are equal, round, and reactive to light. No gross deficits in visual acuity or visual fields.  CN III IV VI:     Extraocular movements are grossly intact.  CN V:              Facial sensation and strength of muscles of mastication grossly intact.  CN VII:            Facial movements are grossly symmetric. No overt weakness.   CN VIII:           Hearing is grossly intact  CN IX and X:  Grossly intact  CN XI:             SCM and trapezius grossly normal  CN XII:            Grossly intact, tongue midline       Exam completed within view of nursing staff.    Labs:  Lab Results (all)       None            Imaging:  Imaging Results (All)       None              Diagnoses:  Unspecified psychosis, but with strong suspicion for OCD with psychotic features  OCD  - Admit to the [facility] for safety and stabilization. Pt is on a voluntary status.  - Appropriate precautions ordered; currently SP3  - Encourage engagement in individual, group, and family therapies as appropriate.  - Collateral as needed. Pt currently declines to sign  any ROIs, but will alert staff if she changes her mind.  - Start Luvox 50 mg QHS tonight, with plan to increase to 100 mg QHS over the weekend. Pt may require additional titration on an outpatient basis to achieve optimal response.  - Consider starting Geodon for delusional thoughts if pt not improving over the next few days, or if delusions are significantly limiting pt's ability to participate in unit activities and maintain adequate nutrition.  - Pt is interested in step-down to [facility] after discharge. Swer assisting with referral. Pt will also require ongoing outpatient mental health services for therapy and medication management.    Malnutrition  - Pt endorses recent weight loss secondary to paranoid delusions about her food being poisoned by chemicals  - Current BMI 18.64; admission UA w/ 2+ ketones  - Will monitor intake at each meal and encourage completion. Will also encourage regular snacks.  - Start MV w/ minerals. Consider starting nutritional shake and consulting dietician if intake remains limited.      Admission labs reviewed: CBC, CMP unremarkable. UA w/ 2+ ketones, 1+ blood, trace bacteria. UDS neg, BAL <10. TSH WNL. Urine hCG neg.  9/18/24 EKG reviewed: Sinus rhythm, no ST elevations, HR 72, Qtc 449 ms.    Given the high risk and/or potentially life-threatening nature of patient's presenting symptoms, patient has been admitted for safety and stabilization and placed on the appropriate level of precautions.  Patient will be assigned a Master's level therapist and encouraged to participate in both individual and group therapy on the unit.  Routine labs have been ordered.    CODE STATUS: Full Code     I have discussed with the patient the risks, benefits, side effects, and treatment alternatives to the patient's psychiatric medications. Patient has also been instructed regarding the risks versus benefits of no treatment and also the fact that treatment does not guarantee results.  Patient voices an  understanding of this and accepts the risks associated with the use of this medication.       Further treatment and disposition planning will be developed prospectively.        This document has been electronically signed by Rosette Camacho MD.  September 25, 2024 10:12 EDT

## 2024-09-25 NOTE — NURSING NOTE
Behavioral Health Assessment    Patient Name: Deann Coon  Patient : 1995    Evaluating RN: Anastasia Medellin RN  Time Called for Assessment:   Assessment Start  and End:       Referral Source: Family  Preferred Name/Pronouns: she/her  Race/Ethnicity: White or   Martial Status: Single  Is Patient Their Own Legal Guardian?: Yes   If No: Guardian Name and Contact Information:     DATA:   Evaluating RN received a call from [facility] staff for a behavioral health consult.  The patient is agreeable to speak with the behavioral health team.  Met with patient at bedside. Patient is not under 1:1 security monitoring.  The attending treatment team is Anastasia Medellin RN and Dr. Dillard, Provider.  Patient presents today with chief compliant of paranoia. I completed the assessment with patient and observations are documented as follows.    ASSESSMENT:    Evaluator consulted with patient for mental status exam and assessment.  Findings are documented as follows. Completed CSSRS with patient for suicide risk assessment.  The results of patient’s CSSRS documented as follows.    Presenting Problems: Patient is paranoid that there are chemicals throughout the house that may harm her.  She won't eat food prepared in the house because she is afraid it will be affected by chemicals.  Family reports patient was sleeping on the porch at one point because she was afraid of chemicals getting on her.  She states she is not taking care of herself, afraid even to take a shower.  Family states that there was paint thinner in the house a year ago and she still worries about harmful effects from that.  Patient states that she was admitted to [facility] back in August of this year and that she was diagnosed as having bipolar disorder, but stopped taking her medications and is not currently taking any medications for it.    Current Stressors: Other Patient states she has no current  "stressors.      Psychiatric History  Current and Previous Therapy: Yes, describe: [facility] 8/24  Current Medication Provider: No  Current Psychiatric Medications: No  Recent Medication Changes: No  Hx of Psychiatric or Detox Hospitalizations:  Yes, describe: [facility] 8/24  If yes, most recent inpatient admission location and approximate date: [facility] 8/24  Number of Previous Suicide Attempts: 0  Most Recent Suicide Attempt Date and Modality: 0      Depression: 2   Anxiety: 7  Sleep: No issues   Appetite: Other not wanting to eat because fear of chemicals in the food.      Medical History  Active Medical Conditions or Biomedical Complications: No    Psychosocial History  Highest Level of Education: technical college   Living Situation:  Lives with others: lives with parents  Occupation: unemployed  Patient Trauma/Abuse History: Further details: no h/o any type of abuse     Does this require reporting: N/A  Patient-Identified Support System: Parents very supportive    Legal History / History of Violence: No legal issues.   History of Inappropriate Sexual Behavior: No    Family Hx  Family Hx of Mental Illness: Yes, describe: grandmother had anxiety and depression and mother has anxiety and depression.  Father states there was a great aunt who had a \"mental breakdown.\"  Family Hx of Substance Abuse: No    Substance Use History  Use, Frequency, and Last Date Used: yes: Marijuana - 9/18/24  Previous COOKIE Treatment: No  UDS/BAL Results: 0  Does the patient have history or current MAT/MOUD: No  Current withdrawal Symptoms: No  History of DT's: No  History of Seizures: No        Holt Suicide Severity Rating Scale (C-SSRS) Screener    Indicate patient's response by placing an \"X\" in the appropriate cell.    Ask Questions 1 and 2 Yes No   In the past month, have you wished you were dead or wished you could go to sleep and not wake up? x    2. In the past month, have you actually had any thoughts of killing " yourself?  x     If Yes to 2, ask Questions 3, 4, 5 and 6. If No to 2, go directly to question 6.       3. Have you been thinking about how you might do this?  x   4. Have you had these thoughts and had some intention of acting on them?  x   5. Have you started to work out or have you worked out the details of how to kill yourself?  x   6. Have you ever in your lifetime done anything, started to do anything, or prepared to do anything to end your life?    Examples: Collected pills, obtained a gun, gave away valuables, wrote a will or    suicide note, took out pills but didn’t swallow any, held a gun but changed your mind    or it was grabbed from your hand, went to the roof but didn’t jump; or actually took    pills, tried to shoot yourself, cut yourself, tried to hang yourself, etc. x    If Yes to 6, ask 7.     7. Was this within the past three months?     Level of Risk per Screen (select highest risk present)  Low Risk     Yellow = Low Risk  Orange = Moderate risk  Red = High Risk    Mental Status Exam:  Appearance: Dressed appropriately, Appears stated age, and Disheveled  Behavior: Appropriate, Calm, Cooperative, and Minimal eye contact  Mood: Anxious  Affect: Congruence with stated mood: Congruent  Speech/Language: Clear, Normal rate, Normal volume, Normal quantity, Non-pressured, Spontaneous, Fluent English, and Other:    Thought Process: Linear, organized, and goal-directed  Thought Content:   Suicidal ideation: Absent  Homicidal ideation: Absent  Hallucinations: Other: not currently, but earlier today  Delusions: None  Orientation: Oriented to person, place, time, and situation  Insight: good  Judgement: good      PLAN:  At this time, evaluator presented the case to on-call provider. Provider recommends inpatient treatment based upon the above assessment.   Collaborated with the treatment team who agree to adopt the recommendations.  I discussed recommendations with patient and/or patient support systems,  and patient is agreeable to the plan.  Patient is agreeable for referrals to be sent to facilities and agencies for treatment.    Care Coordination Timeline:  Time:  Evaluator spoke with Dr. Reeder with Behavioral Health to present case.     Time:  Evaluator called Dr. Reeder at 2200 Intake to check on status of referral.  Provided update to ED staff.   Time:  Patient was accepted by Dr. Reeder to [facility].  Updated patient and treatment team members.     Have the levels of care been discussed with the patient? Yes  Level of care recommendation: Inpatient  Is patient agreeable to treatment? Yes      Safety Planning:  Does the patient have access to weapons or firearms? No  Did evaluator discuss securing weapons, firearms, sharps and/or medications with the patient? N/A    Safety planning will be discussed with patient at time of discharge.       SIGNATURE  Anastasia Medellin RN  21:27 EDT

## 2024-09-25 NOTE — PLAN OF CARE
Problem: Adult Behavioral Health Plan of Care  Goal: Plan of Care Review  Outcome: Progressing  Flowsheets  Taken 9/25/2024 1211 by Hillary Hanson RN  Plan of Care Reviewed With: patient  Taken 9/25/2024 0945 by Jalyn Massey  Progress: improving  Patient Agreement with Plan of Care: agrees  Goal: Patient-Specific Goal (Individualization)  Outcome: Progressing  Flowsheets (Taken 9/25/2024 0945 by Jalyn Massey)  Patient Personal Strengths:   resilient   resourceful   expressive of emotions  Patient Vulnerabilities:   history of unsuccessful treatment   lacks insight into illness   limited support system   occupational insecurity   adverse childhood experience(s)  Goal: Adheres to Safety Considerations for Self and Others  Outcome: Progressing  Flowsheets (Taken 9/25/2024 1736)  Adheres to Safety Considerations for Self and Others: making progress toward outcome  Intervention: Develop and Maintain Individualized Safety Plan  Recent Flowsheet Documentation  Taken 9/25/2024 1606 by Hillary Hanson RN  Safety Measures:   safety rounds completed   safety plan reviewed  Taken 9/25/2024 1400 by Hillary Hanson RN  Safety Measures:   safety rounds completed   safety plan reviewed  Taken 9/25/2024 1200 by Hillary Hanson RN  Safety Measures:   safety rounds completed   safety plan reviewed  Taken 9/25/2024 1000 by Hillary Hanson RN  Safety Measures:   safety rounds completed   safety plan reviewed  Taken 9/25/2024 0807 by Hillary Hanson RN  Safety Measures:   safety rounds completed   safety plan reviewed  Goal: Absence of New-Onset Illness or Injury  Outcome: Progressing  Intervention: Identify and Manage Fall Risk  Recent Flowsheet Documentation  Taken 9/25/2024 1606 by Hillary Hanson RN  Safety Measures:   safety rounds completed   safety plan reviewed  Taken 9/25/2024 1400 by Hillary Hanson, RN  Safety Measures:   safety rounds completed   safety plan reviewed  Taken 9/25/2024 1200 by  Langstrom, Hillary, RN  Safety Measures:   safety rounds completed   safety plan reviewed  Taken 9/25/2024 1000 by Hillary Hanson RN  Safety Measures:   safety rounds completed   safety plan reviewed  Taken 9/25/2024 0807 by Hillary Hanson RN  Safety Measures:   safety rounds completed   safety plan reviewed  Goal: Optimized Coping Skills in Response to Life Stressors  Outcome: Progressing  Flowsheets (Taken 9/25/2024 0945 by Jalyn Massey)  Optimized Coping Skills in Response to Life Stressors: making progress toward outcome  Goal: Develops/Participates in Therapeutic [place] to Support Successful Transition  Outcome: Progressing  Flowsheets (Taken 9/25/2024 1736)  Develops/Participates in Therapeutic [place] to Support Successful Transition: making progress toward outcome     Problem: Activity and Energy Impairment (Anxiety Signs/Symptoms)  Goal: Optimized Energy Level (Anxiety Signs/Symptoms)  Outcome: Progressing  Intervention: Optimize Energy Level  Recent Flowsheet Documentation  Taken 9/25/2024 1606 by Hillary Hanson RN  Diversional Activity: television  Taken 9/25/2024 1211 by Hillary Hanson RN  Activity (Behavioral Health):   activity adjusted per tolerance   up ad nader  Patient Performed Hygiene: shower  Taken 9/25/2024 1200 by Hillary Hanson RN  Diversional Activity: television  Taken 9/25/2024 1000 by Hillary Hanson RN  Diversional Activity:   television   art work  Taken 9/25/2024 0807 by Hillary Hanson RN  Diversional Activity: television     Problem: Cognitive Impairment (Anxiety Signs/Symptoms)  Goal: Optimized Cognitive Function (Anxiety Signs/Symptoms)  Outcome: Progressing  Flowsheets (Taken 9/25/2024 1736)  Mutually Determined Action Steps (Optimized Cognitive Function):   contributes to treatment plan   participates in one-to-one sessions     Problem: Mood Impairment (Anxiety Signs/Symptoms)  Goal: Improved Mood Symptoms (Anxiety Signs/Symptoms)  Outcome:  Progressing  Flowsheets (Taken 9/25/2024 1736)  Mutually Determined Action Steps (Improved Mood Symptoms):   names internal triggers   names external triggers     Problem: Social, Occupational or Functional Impairment (Anxiety Signs/Symptoms)  Goal: Enhanced Social, Occupational or Functional Skills (Anxiety Signs/Symptoms)  Outcome: Progressing  Flowsheets (Taken 9/25/2024 1736)  Mutually Determined Action Steps (Enhanced Social, Occupational or Functional Skills):   identifies support resources   identifies personal strengths     Problem: Behavior Regulation Impairment (Obsessive-Compulsive Signs/Symptoms)  Goal: Improved Behavior Regulation (Obsessive-Compulsive Signs/Symptoms)  Outcome: Progressing  Flowsheets (Taken 9/25/2024 1736)  Mutually Determined Action Steps (Improved Behavior Regulation):   verbalizes motivation for change   identifies internal triggers  Intervention: Promote Behavior Control  Recent Flowsheet Documentation  Taken 9/25/2024 1211 by Hillary Hanson RN  Patient Performed Hygiene: shower     Problem: Mood Impairment (Obsessive-Compulsive Signs/Symptoms)  Goal: Improved Mood Symptoms (Obsessive-Compulsive Signs/Symptoms)  Outcome: Progressing  Flowsheets (Taken 9/25/2024 1736)  Mutually Determined Action Steps (Improved Mood Symptoms):   identifies anxiety reduction strategies   participates in recreation activity   connects behavior with feelings  Intervention: Optimize Emotion and Mood  Recent Flowsheet Documentation  Taken 9/25/2024 1606 by Hillary Hanson RN  Diversional Activity: television  Taken 9/25/2024 1200 by Hillary Hanson RN  Diversional Activity: television  Taken 9/25/2024 1000 by Hillary Hanson RN  Diversional Activity:   television   art work  Taken 9/25/2024 0807 by Hillary Hanson RN  Diversional Activity: television     Problem: Social, Occupational or Functional Impairment (Obsessive-Compulsive Signs/Symptoms)  Goal: Improved Social, Occupational or  "Functional Skills (Obsessive-Compulsive Signs/Symptoms)  Outcome: Progressing  Flowsheets (Taken 9/25/2024 0243)  Mutually Determined Action Steps (Improved Social, Occupational or Functional Skills):   identifies personal strengths   verbalizes recovery goals   Goal Outcome Evaluation:  Plan of Care Reviewed With: patient                        Pt rates appetite as \"fair\" but informed this RN that fear of being poisoned has caused her to avoid food and drink. Pt reports sleep as \"good.\" Pt denies any pain at this time. Pt rates anxiety as 6/10 and depression as 3/10. Pt denies SI, HI, and identifies auditory hallucinations as voices that are \"threatening most of the time.\" Last BM reported yesterday. Pt has started medication during this shift. Please reference MAR.                    "

## 2024-09-25 NOTE — NURSING NOTE
Preauthorization waived and inpatient stay approved under [place] Covid Waiver by [facility] by [facility]. Validated by  representative Anastasia RODARTE with reference # [id number].

## 2024-09-25 NOTE — PLAN OF CARE
Problem: Adult Behavioral Health Plan of Care  Goal: Plan of Care Review  Outcome: Progressing  Flowsheets  Taken 9/25/2024 0448  Progress: no change  Plan of Care Reviewed With: patient  Patient Agreement with Plan of Care: agrees  Outcome Evaluation: Patient denied SI/HI. Patient did admit to having auditory hallucinations. Patient rated anxiety 6/10 and depression 2/10. Patient slept after being admitted to the unit.  Taken 9/24/2024 2304  Plan of Care Reviewed With: patient  Patient Agreement with Plan of Care: agrees   Goal Outcome Evaluation:  Plan of Care Reviewed With: patient  Patient Agreement with Plan of Care: agrees     Progress: no change  Outcome Evaluation: Patient denied SI/HI. Patient did admit to having auditory hallucinations. Patient rated anxiety 6/10 and depression 2/10. Patient slept after being admitted to the unit.

## 2024-09-25 NOTE — PLAN OF CARE
"Goal Outcome Evaluation:  Plan of Care Reviewed With: patient  Patient Agreement with Plan of Care: agrees  Consent Given to Review Plan with: No one at the moment.  Progress: improving  Outcome Evaluation: Therapist met with patient to review care plan, social history, aftercare recommendation, and disposition; Patient agreeable. Patient reports anxiety from \"everything being contaminated.\" Patient reported that they have not been eating food and drinking water because of fear of being poisoned. Patient was provided with paper cup. Patient witnessed engaging positive with unit staff and peers.       Problem: Adult Behavioral Health Plan of Care  Goal: Plan of Care Review  Outcome: Progressing  Flowsheets (Taken 9/25/2024 0945)  Consent Given to Review Plan with: No one at the moment.  Progress: improving  Plan of Care Reviewed With: patient  Patient Agreement with Plan of Care: agrees  Outcome Evaluation:   Therapist met with patient to review care plan, social history, aftercare recommendation, and disposition   Patient agreeable.  Goal: Patient-Specific Goal (Individualization)  Outcome: Progressing  Flowsheets (Taken 9/25/2024 0945)  Patient Personal Strengths:   resilient   resourceful   expressive of emotions  Patient-Specific Goals (Include Timeframe): Identify 1-3 coping skills, address mental health relapse prevention methods, complete aftercare plan, complete safety plan, and deny SI/HI prior to discharge.  Individualized Care Needs: Therapist will offer 1-4 therapy sessions, aftercare planning, safety planning, family education, daily groups, and brief CBT/DBT/MI Interventions.  Anxieties, Fears or Concerns: Patient reports that they are scared of the air, water, and environment being contaminated with chemicals.  Patient Vulnerabilities:   history of unsuccessful treatment   lacks insight into illness   limited support system   occupational insecurity   adverse childhood experience(s)  Goal: Optimized " Coping Skills in Response to Life Stressors  Outcome: Progressing  Flowsheets (Taken 9/25/2024 0945)  Optimized Coping Skills in Response to Life Stressors: making progress toward outcome  Intervention: Promote Effective Coping Strategies  Flowsheets (Taken 9/25/2024 0945)  Supportive Measures:   active listening utilized   counseling provided   decision-making supported   guided imagery facilitated   problem-solving facilitated   relaxation techniques promoted   verbalization of feelings encouraged   self-reflection promoted   self-responsibility promoted   goal-setting facilitated   journaling promoted   positive reinforcement provided   self-care encouraged  Goal: Develops/Participates in Therapeutic [place] to Support Successful Transition  Flowsheets (Taken 9/25/2024 0945)  Develops/Participates in Therapeutic [place] to Support Successful Transition: making progress toward outcome  Intervention: Foster Therapeutic [place]  Flowsheets (Taken 9/25/2024 0945)  Trust Relationship/Rapport:   care explained   choices provided   emotional support provided   empathic listening provided   questions answered   questions encouraged   reassurance provided   thoughts/feelings acknowledged  Intervention: Mutually Develop Transition Plan  Flowsheets (Taken 9/25/2024 0945)  Outpatient/Agency/Support Group Needs: (Crisis Stabilization Unit to ease transition of going back to home.)   case management   outpatient medication management   outpatient counseling   other (see comments)  Discharge Coordination/Progress:   Therapist met with patient to complete discharge needs assessment   Patient is considering Crisis Stabilization Unit at [facility] due to needing more time to prepare for going home.  Transition Support:   community resources reviewed   crisis management plan promoted   follow-up care discussed  Concerns Comments: Patient reports not feeling safe to go home due to chemicals in the water, air, and  pipes.  Transportation Anticipated: family or friend will provide  Anticipated Discharge Disposition: (Crisis Stabilization Unit) other (see comments)  Transportation Concerns: none  Current Discharge Risk: psychiatric illness  Concerns to be Addressed:   adjustment to diagnosis/illness   care coordination/care conferences   coping/stress   discharge planning   mental health   medication  Readmission Within the Last 30 Days: current reason for admission unrelated to previous admission  Patient/Family Anticipated Services at Transition:   mental health services   outpatient care  Patient's Choice of Community Agency(s): Patient reports that they are doing medication management via telehealth but could not remember the name.  Patient/Family Anticipates Transition to: ([facility] Crisis Stabilization Unit) other (see comments)  Offered/Gave Vendor List: no

## 2024-09-25 NOTE — ED NOTES
Pt states that her symptoms got bad on 9/19/24 when she started to believe that chemicals were on her hands. She said that others assured her that there were no chemicals on her hands she just couldn't believe them. This progressed to her believing that there was chemicals in her parents house and on the food there. She became scared to eat or drink for fear of chemicals were on it. She states that she has been hearing voices telling her that chemicals were on her food and drinks etc. Pt states that she has stopped eating but will occasionally eat outside of parents home. Pt doesn't know how much weight she has lost. Pt states she is having difficulty remembering things clearly.Pt denies S.I, or H.I or feeling like anyone else is trying to hurt her. Pt states she had been hospitalized @ [facility] and prescribed Lithium for Bi-polar and a BP medicine. Pt states that she took the meds for 1 week and stopped both meds. She said the BP med made her feel like she was going to pass out and that she thought the Lithium would make the chemicals go into her blood stream.

## 2024-09-25 NOTE — SIGNIFICANT NOTE
09/25/24 1626   Group Therapy Session   Group Attendance attended group session   Time Session Began 1545   Time Session Ended 1610   Total Time (minutes) 25   Group Type psychoeducation   Group Topic Covered cognitive therapy techniques;coping skills/lifestyle management;other (see comments)  (Mental health)   Literature/Videos Given other (see comments)  (Journal provided)   Literature/Videos Given Comments Journal provided to document symtoms, thoughts and feeling about mental health, etc.   Group Session Detail Therapist discussed mental health may lead to distress. Examples were provided. Patient shared personal examples. Therapist encourage patient to document symptoms, including high anxiety, depression, and difficulty eating due to pervasive thoughts of chemicals harming her. Patient agreabale to example symptoms, challenge any irrational believes, and develop/strengthen coping skills.   Patient Participation/Contribution able to recall/repeat info presented;discussed personal experience with topic;cooperative with task;expressed readiness to alter behaviors;listened actively;organized   Patient Participation Detail Patient engaged in conversation regarding mental health and her current symptoms of recurring thoughts that chemcials are harming her or will harm her. Patient agreed to document recurrent thoughts/beliefs that create distress. Patient expressed desire to develop coping skills to better manage symptoms and to decrease anxiety.   Affect During Group calm;bright;affect consistent with mood;hopeful   Degree of Insight moderate

## 2024-09-25 NOTE — PROGRESS NOTES
DATA:      Therapist met individually with patient this date to introduce role and to discuss hospitalization expectations. Patient agreeable. Reviewed medical record and staffed case with treatment team this date.      Therapist contacted patient's mother, Vanessa, as collateral to obtain information about events leading to hospitalization. Patient's mother reported that patient has refused to stop sleeping in their bedroom in the last two weeks and insisted on sleeping outside on the porch due to thinking the air in the house is poison. Patient's mother reports that yesterday patient was refusing to eat or drink water. Patient's mother reports that patient is not compliant with treatment recommendations and taking medications. Patient's mother reports that patient has struggled with mental health issues since they were 14 years old.      Clinical Maneuvering/Intervention:     Therapist assisted patient in processing above session content; acknowledged and normalized patient’s thoughts, feelings, and concerns.  Discussed the therapist/patient relationship and explain the parameters and limitations of relative confidentiality.  Also discussed the importance of active participation, and honesty to the treatment process.  Encouraged the patient to discuss/vent their feelings, frustrations, and fears concerning their ongoing medical issues and validated their feelings.     Allowed patient to freely discuss issues without interruption or judgment. Provided safe, confidential environment to facilitate the development of positive therapeutic relationship and encourage open, honest communication.      Therapist completed integrated summary, treatment plan, and initiated social history this date. Therapist is strongly encouraging family involvement in treatment.       ASSESSMENT:      Patient is a 29-year-old female admitted to [facility] for concerns of paranoid psychosis. Patient resides in  "[place], [place] and lives with parents.     Patient reports being diagnosed with bipolar disorder, anxiety, depression. Patient reports having an upcoming medication appointment via telehealth but could not remember the name of agency. Patient reports being diagnosed with an eating disorder in high school.     Patient reports that her mental health has been deteriorating significantly in the last year. Patient reports that they do not have any friends but reconnected with ex-boyfriend lately.   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 \"at least six times but less than ten,\" and [facility] last month. Patient reports that they were sent to [facility] by police; patient reports that they went for a drive to clear their head and ran out of gas in [place], [place]. Patient reports that  stopped her while she was walking on the side of [facility] and called her parents due to her \"not making any sense.\"      Childhood History: Patient reports arguing with parents over going to school and grades. Patient reports getting along with parents now.      Suicide Attempts:  Patient denies past suicide attempts but reports history of suicidal ideations.     Alcohol: Patient reports drinking socially and alone. Patient estimated that they drink 4-5x per month 3-4 glasses of wine at a time.    Substance use: Patient reports smoking marijuana socially, but not consistently. Patient reports that they misused Adderall in early 20s.     Legal: Denies.     Relationship/support: Patient denies having any friends. Patient reports that her parents are supportive and " assist her financially.     Spiritual:  Denies being spiritual and denies having a higher power.     Education: Patient reports completing High School and attending cosmetology school.       Access to firearms:  Denies.         PLAN:       Patient to remain hospitalized this date.      Treatment team will focus efforts on stabilizing patient's acute symptoms while providing education on healthy coping and crisis management to reduce hospitalizations.   Patient requires daily psychiatrist evaluation and 24/7 nursing supervision to promote patient  safety.     Therapist will offer 1-4 individual sessions, family education, and appropriate referral.     Therapist recommends [facility] Crisis Stabilization Services upon discharge due to patient not feeling comfortable with going straight home from hospital.

## 2024-09-26 PROCEDURE — 99233 SBSQ HOSP IP/OBS HIGH 50: CPT | Performed by: STUDENT IN AN ORGANIZED HEALTH CARE EDUCATION/TRAINING PROGRAM

## 2024-09-26 RX ADMIN — Medication 1 TABLET: at 08:07

## 2024-09-26 RX ADMIN — FLUVOXAMINE MALEATE 50 MG: 50 TABLET ORAL at 20:44

## 2024-09-26 NOTE — PROGRESS NOTES
Adult Nutrition  Assessment/PES    Patient Name:  Deann Coon  YOB: 1995  MRN: [id number]  Admit Date:  9/24/2024    Assessment Date:  9/26/2024    Comments:  Admission screen-automatic referral received    Pt on Regular Diet.  Po intake 25-75% of meals.    Pt is considered underweight due to BMI being below 19.     Spoke w staff via phone who reports eating ok, thin. Staff decline visit for pt at this time.    Will remain available.     Reason for Assessment       Row Name 09/26/24 0918          Reason for Assessment    Reason For Assessment identified at risk by screening criteria     Diagnosis other (see comments)                                                       Electronically signed by:  Lizzie Cordova RD  09/26/24 09:19 EDT

## 2024-09-26 NOTE — PLAN OF CARE
Problem: Adult Behavioral Health Plan of Care  Goal: Plan of Care Review  Outcome: Progressing  Flowsheets  Taken 9/26/2024 1240 by Eveline Newell RN  Progress: improving  Plan of Care Reviewed With: patient  Taken 9/26/2024 0415 by Ivelisse Desai RN  Patient Agreement with Plan of Care: agrees  Goal: Patient-Specific Goal (Individualization)  Outcome: Progressing  Flowsheets (Taken 9/25/2024 0945 by Jalyn Massey)  Patient Personal Strengths:   resilient   resourceful   expressive of emotions  Anxieties, Fears or Concerns: Patient reports that they are scared of the air, water, and environment being contaminated with chemicals.  Patient Vulnerabilities:   history of unsuccessful treatment   lacks insight into illness   limited support system   occupational insecurity   adverse childhood experience(s)  Goal: Adheres to Safety Considerations for Self and Others  Outcome: Progressing  Flowsheets (Taken 9/25/2024 1736 by Hillary Hanson RN)  Adheres to Safety Considerations for Self and Others: making progress toward outcome  Intervention: Develop and Maintain Individualized Safety Plan  Recent Flowsheet Documentation  Taken 9/26/2024 1200 by Eveline Newell RN  Safety Measures: safety rounds completed  Taken 9/26/2024 0800 by Eveline Newell RN  Safety Measures: safety rounds completed  Goal: Absence of New-Onset Illness or Injury  Outcome: Progressing  Intervention: Identify and Manage Fall Risk  Recent Flowsheet Documentation  Taken 9/26/2024 1200 by Eveline Newell RN  Safety Measures: safety rounds completed  Taken 9/26/2024 0800 by Eveline Newell RN  Safety Measures: safety rounds completed  Goal: Optimized Coping Skills in Response to Life Stressors  Outcome: Progressing  Flowsheets (Taken 9/25/2024 0945 by Jalyn Massey)  Optimized Coping Skills in Response to Life Stressors: making progress toward outcome  Goal: Develops/Participates in Therapeutic [place] to Support Successful  Transition  Outcome: Progressing  Flowsheets (Taken 9/26/2024 1240)  Develops/Participates in Therapeutic [place] to Support Successful Transition: making progress toward outcome     Goal Outcome Evaluation:  Plan of Care Reviewed With: patient        Progress: improving       Patient denies SI/HI/AVH. Pt pleasant and cooperative. Pt compliant with medication. Pt continues to report fear of chemicals being in the environment. Pt covers her mouth for fear of getting chemicals in her mouth when speaking. Pt prefers to use paper instead of plastic or other materials. Pt able to identify her thought process to be irrational however she does not know know to make her thoughts stop. Pt out in dayroom most of shift watching television. Pt does not prefer to participate in group activities and would rather 1:1 interactions with plenty of space between the person she is interacting with. Pt encouraged to participate in her treatment plan.

## 2024-09-26 NOTE — NURSING NOTE
Patient observed pacing the milieu. RN offered patient Trazodone and Hydroxyzine prn. Patient refused both. RN educated to reach out if patient changed her mind. Patient verbalized understanding.

## 2024-09-26 NOTE — PROGRESS NOTES
Recreational Therapy Note    Patient Name: Deann Coon   MRN: [id number]    Therapeutic Recreation Eval and Treat (Last 12 Hours)       Therapeutic Recreation Eval & Treat       Row Name 09/26/24 0900       Lifestyle/Recreational History/Interest    Current Living Situation with family  -    Support Network family  -      Row Name 09/26/24 0900       Recreational History and Interests    How Important is Recreation to You? somewhat important  -    Are You a Social Person? no  states they do not have friends  -    Current Hobbies/Interests interaction with pets;exercise/fitness;outdoor activities;family functions;television/movies/videos  -    Exercise/Fitness walking/running  -      Row Name 09/26/24 0900       Barriers To Leisure Activity    Barriers to Leisure Activity lack of companionship;lack of motivation;mental health concerns  -      Row Name 09/26/24 0900       Coping/Wellbeing    Current State of Wellbeing anxiety;increased stressors  -    Factors Impacting Current State of Wellbeing anxiety  -      Row Name 09/26/24 0900       Therapeutic Recreation Participation    Orientation to Therapeutic Recreation patient  -      Row Name 09/26/24 0900       Therapeutic Recreation Assessment/Plan    Recreation Therapy Goals/Objectives leisure participation;socialization skills;self-image, confidence, esteem;coping skills/strategies  -    Recreation Plan structured leisure participation  -              User Key  (r) = Recorded By, (t) = Taken By, (c) = Cosigned By      Initials Name Provider Type    Jodi Lui, CTRS Recreational Therapist                      EASTON Dao  9/26/2024

## 2024-09-26 NOTE — PROGRESS NOTES
"    Inpatient Psych Progress Note     Clinician: Rosette Camacho MD  Admission Date: 9/24/2024  13:32 EDT 09/26/24    Behavioral Health Treatment Plan and Problem List: I have reviewed and approved the Behavioral Health Treatment Plan and Problem list.    Allergies  No Known Allergies    Hospital Day: 2 days      Assessment completed within view of staff    History  CC/clinical focus: Paranoia about contamination    Interval HPI: Patient seen and evaluated by me.  Chart reviewed. Discussed with treatment team and unit staff. No major behavioral issues overnight. Pt initially declined scheduled evening medication due to fear of possible chemicals coming from the plastic cup containing her medication and also out of fear that RN had touched the medication. Pt eventually cooperative with taking medication out of paper cup. No PRNs required. Pt has been participating in therapeutic activities without issue. Interactions with staff and peers have been appropriate. On interview today, pt reports that she is doing okay, but admits that she did not sleep last night and feels quite tired. She explains that she was very fearful that the foam in her mattress might leak chemicals that could kill her, so she laid on the floor instead and was unable to rest comfortably. When asked how she would have felt if she had remained on the mattress, she states \"I would have lost it\". She says that her proof that the chemicals from the mattress are affecting her is that when she is on the mattress \"my skin feels all weird and I get anxious\". Pt takes well to having delusion challenged and accepts that anxiety itself can cause somatic effects, but says that her fear of being poisoned by chemicals \"still seems so real to me\". Pt says that she was able to eat most of her breakfast this morning, and cautions that she will likely always leave some food on her plate due to her refusal to eat anything her hands or plastic have touched. She says " "that her anxiety about being poisoned remains high, but she believes that we are doing our best to keep her safe and help her with this anxiety. She denies feeling hopeless today, and recognizes that improvement can come with medications and hard work. She denies any issues with starting Luvox last night. Discussed adding Geodon tomorrow to help with the obsessive, paranoid thoughts, and she is amenable to this. Other than feeling tired, pt currently denies all physical complaints. She denies current SI/HI/AVH. ROS otherwise as below.    Interval Review of Systems:   General ROS: negative for - fever or malaise  Endocrine ROS: negative for - palpitations  Respiratory ROS: no cough, shortness of breath, or wheezing  Cardiovascular ROS: no chest pain or dyspnea on exertion  Gastrointestinal ROS: no abdominal pain, no black or bloody stools    /70 (BP Location: Left arm, Patient Position: Sitting)   Pulse 63   Temp 97.3 °F (36.3 °C) (Oral)   Resp 18   Ht 165.1 cm (65\")   Wt 50.8 kg (112 lb)   LMP 09/18/2024 (Exact Date)   SpO2 100%   BMI 18.64 kg/m²     Mental Status Exam  Behavior: Cooperative, pleasant, alert and mildly guarded  Psychomotor activity: Calm, no involuntary movements observed  Orientation: x4  Mood: \"afraid\"  Affect: mood congruent, constricted, anxious  Thought Process: linear, organized, goal-directed  Thought Content: Denies current AVH, no RIS observed; endorses obsessive, delusional belief that plastics, metals, and foam are leeching chemicals that might kill her and appears paranoid about this  Concentration: Fair  Suicidal Ideation: Denies  Homicidal Ideation: Denies  Insight: Limited  Judgement: Limited      Medical Decision Making:   Labs:     Lab Results (last 24 hours)       ** No results found for the last 24 hours. **              Radiology:     Imaging Results (Last 24 Hours)       ** No results found for the last 24 hours. **              EKG:     ECG/EMG Results (most " recent)       None             Medications:  fluvoxaMINE, 50 mg, Oral, Nightly  multivitamin with minerals, 1 tablet, Oral, Daily           All medications reviewed.    Assessment and Plan:      OCD with psychotic features  - Continue hospitalization at [facility] for safety and stabilization. Pt is on a voluntary status.  - Appropriate precautions ordered; currently SP3  - Encourage engagement in individual, group, and family therapies as appropriate.  - Collateral as needed. Pt currently declines to sign any ROIs, but will alert staff if she changes her mind.  - Continue Luvox 50 mg QHS, with plan to increase to 100 mg QHS tomorrow night as long as pt continues to tolerate well. Pt may require additional titration on an outpatient basis to achieve optimal response.  - Start Geodon 20 mg BID w/ meals tomorrow for psychotic features, obsessive thoughts. Consider titration as needed.   - Pt is interested in step-down to [facility] after discharge. Swer assisting with referral. Pt will also require ongoing outpatient mental health services for therapy and medication management.     Malnutrition  - Pt endorses recent weight loss secondary to paranoid delusions about her food being poisoned by chemicals  - Admission BMI 18.64; admission UA w/ 2+ ketones  - Will monitor intake at each meal and encourage completion. Will also encourage regular snacks.  - Continue MV w/ minerals, nutritional shake.    Continue hospitalization for safety and stabilization.  Continue current level of Special Precautions (q15 minute checks). Anticipate that pt may be ready for discharge by early next week.      This document has been electronically signed by Rosette Camacho MD.  September 26, 2024 13:32 EDT

## 2024-09-26 NOTE — SIGNIFICANT NOTE
09/26/24 1540   Group Therapy Session   Group Attendance attended group session   Time Session Began 1540   Time Session Ended 1610   Total Time (minutes) 30   Group Type psychoeducation;life skill   Group Topic Covered coping skills/lifestyle management;relaxation techniques   Literature/Videos Given topic videos   Literature/Videos Given Comments Video demonstrating mindfulness   Group Session Detail Information provided on benefits of mindfulness as a coping mechnism. Video demonstration provided with opportunity to practice skills.   Patient Participation/Contribution able to recall/repeat info presented;cooperative with task;discussed personal experience with topic;listened actively;organized;expressed understanding of topic   Patient Participation Detail Patient particpated in discussion regarding mindfulness; observed video and particpated in interactive video experience.   Affect During Group affect consistent with mood;appropriate to situation   Degree of Insight moderate

## 2024-09-26 NOTE — PLAN OF CARE
Problem: Adult Behavioral Health Plan of Care  Goal: Plan of Care Review  Outcome: Not Progressing  Flowsheets  Taken 9/26/2024 0415  Progress: no change  Plan of Care Reviewed With: patient  Patient Agreement with Plan of Care: agrees  Outcome Evaluation: Patient denies SI/HI. Patient observed pacing throughout the dayroom. Patient offered prn medications to help sleep. Patient refused prns. Patient refused to sleep in her bed and slept on the floor. Patient was observed pacing in her room. Patient refused prns for anxiety. Patient did not sleep throughout the night.  Taken 9/25/2024 1925  Plan of Care Reviewed With: patient  Patient Agreement with Plan of Care: agrees   Goal Outcome Evaluation:  Plan of Care Reviewed With: patient  Patient Agreement with Plan of Care: agrees     Progress: no change  Outcome Evaluation: Patient denies SI/HI. Patient observed pacing throughout the dayroom. Patient offered prn medications to help sleep. Patient refused prns. Patient refused to sleep in her bed and slept on the floor. Patient was observed pacing in her room. Patient refused prns for anxiety. Patient did not sleep throughout the night.

## 2024-09-27 PROCEDURE — 99233 SBSQ HOSP IP/OBS HIGH 50: CPT | Performed by: STUDENT IN AN ORGANIZED HEALTH CARE EDUCATION/TRAINING PROGRAM

## 2024-09-27 RX ORDER — TRAZODONE HYDROCHLORIDE 50 MG/1
25 TABLET, FILM COATED ORAL NIGHTLY PRN
Status: DISCONTINUED | OUTPATIENT
Start: 2024-09-27 | End: 2024-10-01 | Stop reason: HOSPADM

## 2024-09-27 RX ORDER — ASENAPINE 5 MG/1
5 TABLET SUBLINGUAL EVERY 8 HOURS PRN
Status: DISCONTINUED | OUTPATIENT
Start: 2024-09-27 | End: 2024-09-28

## 2024-09-27 RX ORDER — ZIPRASIDONE HYDROCHLORIDE 20 MG/1
40 CAPSULE ORAL 2 TIMES DAILY WITH MEALS
Status: DISCONTINUED | OUTPATIENT
Start: 2024-09-28 | End: 2024-10-01 | Stop reason: HOSPADM

## 2024-09-27 RX ORDER — ZIPRASIDONE HYDROCHLORIDE 20 MG/1
20 CAPSULE ORAL 2 TIMES DAILY WITH MEALS
Status: DISCONTINUED | OUTPATIENT
Start: 2024-09-27 | End: 2024-09-27

## 2024-09-27 RX ORDER — FLUVOXAMINE MALEATE 50 MG
100 TABLET ORAL NIGHTLY
Status: DISCONTINUED | OUTPATIENT
Start: 2024-09-27 | End: 2024-09-30

## 2024-09-27 RX ORDER — TRAZODONE HYDROCHLORIDE 50 MG/1
25 TABLET, FILM COATED ORAL NIGHTLY
Status: DISCONTINUED | OUTPATIENT
Start: 2024-09-27 | End: 2024-09-30

## 2024-09-27 RX ORDER — ZIPRASIDONE HYDROCHLORIDE 20 MG/1
20 CAPSULE ORAL 2 TIMES DAILY WITH MEALS
Status: COMPLETED | OUTPATIENT
Start: 2024-09-27 | End: 2024-09-28

## 2024-09-27 RX ADMIN — TRAZODONE HYDROCHLORIDE 25 MG: 50 TABLET ORAL at 21:00

## 2024-09-27 RX ADMIN — Medication 1 TABLET: at 07:59

## 2024-09-27 RX ADMIN — ASENAPINE MALEATE 5 MG: 5 TABLET SUBLINGUAL at 14:46

## 2024-09-27 RX ADMIN — FLUVOXAMINE MALEATE 100 MG: 50 TABLET ORAL at 21:00

## 2024-09-27 RX ADMIN — ZIPRASIDONE HYDROCHLORIDE 20 MG: 20 CAPSULE ORAL at 07:59

## 2024-09-27 RX ADMIN — ZIPRASIDONE HYDROCHLORIDE 20 MG: 20 CAPSULE ORAL at 17:36

## 2024-09-27 NOTE — PROGRESS NOTES
"DATA:    Therapist met with the patient individually. Therapist continues reviewing plan of care and aftercare plan.  The patient was agreeable.[facility] was pre-approved for [facility] with pending admission date being 10/1/24 1:30 PM. Patient's mother is available to assist with transportation to [facility] upon discharge. Patient has a consultation with an outpatient therapist, Angie Richards, via telephone Monday, 9/30/2024 at 11:00 AM.     ASSESSMENT:    Patient was seen for follow up of OCD with psychotic features.     Today, patient was seen 1-1 at bedside. The patient's affect appeared calm, mood congruent, thought content normal.     Patient denies SI/HI. Patient rates depression as 1/10 and anxietyas 5/10. Patient reports anxiety is high due to fear of going to sleep. Patient reports having trouble sleeping in room and concern with foam mattress.     Patient reports being more comfortable with paper cups and plates. Patient observed utilizing plastic objects, such as phone and eating utensils, with a paper towel. Patient reports feeling uncomfortable having direct contact with plastic surfaces.     Patient reports that the voices in her head comment on objects and tell her that the chemicals in the objects will kill her if she comes in contact with them. Patient reports that the voices are multiple voices, but there is one voice that is the most threatening. Patient reports that the voices are threatening and create tension if she ignores them. Patient reports not wanting to upset the voices, so she avoids people, places, and things.     Patient identified the benefits of taking medications as prescribed and stated, \"I want to do things different and take care of myself.\" Patient reports feeling hopeful for future. Patient cooperative and engaged throughout shift with staff and peers in positive manner.     CLINICAL MANEUVERING:    Therapist provided safe, secure " environment for patient to share.  Provided reflective listening and psychoeducation.  Assisted patient in processing the above session. Assisted patient in identifying any questions or needs to be addressed today.        Plan:     Patient to remain hospitalized this date.      Treatment team will focus efforts on stabilizing patient's acute symptoms while providing education on healthy coping and crisis management to reduce hospitalizations.   Patient requires daily psychiatrist evaluation and 24/7 nursing supervision to promote patient  safety.     Therapist will offer 1-4 individual sessions, family education, and appropriate referral.     Therapist recommends continued assessment.

## 2024-09-27 NOTE — PLAN OF CARE
"Goal Outcome Evaluation:  Plan of Care Reviewed With: patient     Consent Given to Review Plan with: Mom  Progress: improving  Outcome Evaluation: Patient denies SI/HI. Patient reports difficulty sleeping because she thought that her mattress had chemicals in it and would harm her. This led to her \"skin felt weird\" and her feeling anxious. Therapist discuss how anxiety may lead to the sensations on her skin. Patient reports she thinks can be true but it is not her reality.    Time:1630    DATA:  Therapist met with the patient individually in group room. Therapist continues reviewing plan of care and discharge plan.  The patient was agreeable.    ASSESSMENT:    Patient was seen for follow up of  OCD.    Today, patient was seen 1-1 in group room. The patient's affect appeared constricted, anxious, mood congruent. Patient was cooperative with appropriate eye contact. Patient covered her mouth as she described the difficulty she was experiencing sleeping because of her fear that the mattress has chemical in it that would kill her. Patient expressed desire to learn techniques so she can function.     Verbal release signed to speak with Nisha.      CLINICAL MANEUVERING:   Therapist provided safe, secure environment for patient to share.  Provided reflective listening and psychoeducation.  Assisted patient in processing the above session. Assisted patient in identifying any questions or needs to be addressed today.        Patient expressed desire to change her thought patterns yet reported feeling uncertain of how to accomplish this because her thoughts are so real to her.    Therapist asked patient to consider writing her thoughts along with any identified emotions in her journal to discuss at future session.    Plan:     Patient to remain hospitalized this date.      Treatment team will focus efforts on stabilizing patient's acute symptoms while providing education on healthy coping and crisis management to reduce " hospitalizations.   Patient requires daily psychiatrist evaluation and 24/7 nursing supervision to promote patient  safety.     Therapist will offer 1-3 individual sessions, family education, and appropriate referral.     Therapist recommends continued assessment.

## 2024-09-27 NOTE — SIGNIFICANT NOTE
09/27/24 1030   Group Therapy Session   Group Attendance attended group session   Time Session Began 1030   Time Session Ended 1115   Total Time (minutes) 45   Group Type psychoeducation   Group Topic Covered cognitive therapy techniques;other (see comments)  (Acceptance and Commitment Therapy)   Literature/Videos Given topic handouts   Literature/Videos Given Comments Participant worksheets were provided on topic of challenging negative thoughts and confronting avoidance.   Group Session Detail Therapist led group discussion on challenging negative thougths that may effect one's mental health. Examples were shared and an interactive worksheet was provided and discussed. Additona l topical area and handout included applying ACT skill to confront avoidance.   Patient Participation/Contribution discussed personal experience with topic;cooperative with task;able to recall/repeat info presented;expressed readiness to alter behaviors   Patient Participation Detail Participant remained standing throughout the group stating she wanted to remain focused on activity since she had little sleep last night. Patient engaged in group discussion providing personal examples and expressing her frustration with integrating coping skills to be be more functional. Participant stated she valued the infomration and thinks it can be helpful over time.   Affect During Group affect consistent with mood;restricted  (Patient manitained deeply focussed with wide eyes and a seemingly blank stare.)   Degree of Insight low  (Patient demonstrates some level of awareness that her thoughts may be irrational and restricting and acknowledges how she can see how some thoughts aren't true. She shares her frustration about the thoughts being pronounced and very real to her.)

## 2024-09-27 NOTE — PLAN OF CARE
Problem: Adult Behavioral Health Plan of Care  Goal: Plan of Care Review  Outcome: Progressing  Flowsheets  Taken 9/27/2024 0828  Progress: improving  Taken 9/27/2024 0735  Plan of Care Reviewed With: patient  Patient Agreement with Plan of Care: agrees  Taken 9/27/2024 0700  Plan of Care Reviewed With: patient  Patient Agreement with Plan of Care: agrees  Goal: Patient-Specific Goal (Individualization)  Outcome: Progressing  Flowsheets (Taken 9/25/2024 0945 by Jalyn Massey)  Patient Personal Strengths:   resilient   resourceful   expressive of emotions  Patient Vulnerabilities:   history of unsuccessful treatment   lacks insight into illness   limited support system   occupational insecurity   adverse childhood experience(s)  Goal: Adheres to Safety Considerations for Self and Others  Outcome: Progressing  Flowsheets (Taken 9/25/2024 1736 by Hillary Hanson RN)  Adheres to Safety Considerations for Self and Others: making progress toward outcome  Intervention: Develop and Maintain Individualized Safety Plan  Recent Flowsheet Documentation  Taken 9/27/2024 0800 by Lizz Patel RN  Safety Measures: safety rounds completed  Taken 9/27/2024 0735 by Lizz Patel, RN  Safety Measures: safety rounds completed  Goal: Absence of New-Onset Illness or Injury  Outcome: Progressing  Intervention: Identify and Manage Fall Risk  Flowsheets  Taken 9/27/2024 0800  Safety Measures: safety rounds completed  Taken 9/27/2024 0735  Safety Measures: safety rounds completed  Goal: Optimized Coping Skills in Response to Life Stressors  Outcome: Progressing  Flowsheets (Taken 9/25/2024 0945 by Jalyn Massey)  Optimized Coping Skills in Response to Life Stressors: making progress toward outcome  Intervention: Promote Effective Coping Strategies  Recent Flowsheet Documentation  Taken 9/27/2024 0735 by Lizz Patel, RN  Supportive Measures:   active listening utilized   decision-making supported   goal-setting  facilitated   journaling promoted   positive reinforcement provided   problem-solving facilitated   relaxation techniques promoted   self-care encouraged   verbalization of feelings encouraged  Taken 9/27/2024 0700 by Lizz Patel, RN  Supportive Measures:   active listening utilized   decision-making supported   goal-setting facilitated   journaling promoted   positive reinforcement provided   problem-solving facilitated   relaxation techniques promoted   self-care encouraged   verbalization of feelings encouraged  Goal: Develops/Participates in Therapeutic [place] to Support Successful Transition  Outcome: Progressing  Flowsheets (Taken 9/26/2024 1240 by Eveline Newell, RN)  Develops/Participates in Therapeutic [place] to Support Successful Transition: making progress toward outcome   Goal Outcome Evaluation:  Plan of Care Reviewed With: patient  Patient Agreement with Plan of Care: agrees     Progress: improving     Pt reports appetite as poor. Pt reports feeling intermittently nauseated. Pt declined any PRN's for nausea. Pt reports she slept approximately 2 hours last night. Pt states she made a bed with blankets on the floor and that is where she laid down. Pt denies having any pain. Pt is AAOx4. Pt reports current anxiety as  4/10 and depression 1/10. Pt denies having any SI/HI/AVH. Pt does report some delusions, for example pt states there are chemicals in her food, bed, plastic med cups and styrofoam cups. Pt keeping milk carton to fill up with water. Medications administered in paper cup. Notified physician pt requesting to start geodon today. Physician gave VO to start geodon 20mg BID with meals. First dose was administered with breakfast. Pt reports last BM was on 7/24/24. Informed pt miralax was available if needed. Pt compliant with medication administration. Remains isolative to self. Appropriate staff interaction. Pt continuously pacing in group room/halls and room. Pt did refuse her  "temperature during vitals as the probe is made of plastic, meaning it is made up of \"chemicals\". Pt took a dose of saphris approximately at 2:45pm. Pt took approximately a 2 1/2 hour nap. Pt got up to eat dinner. Physician made some medication changes, see MAR. Informed pt to call mom and not dad r/t dads phone not working (for visitation).                             "

## 2024-09-27 NOTE — PLAN OF CARE
Goal Outcome Evaluation:  Plan of Care Reviewed With: patient  Patient Agreement with Plan of Care: agrees  Consent Given to Review Plan with: No one at the moment.  Progress: improving  Outcome Evaluation: Patient denies SI/HI. Patient rates depression as 1/10 and anxietyas 5/10. Patient reports anxiety is high due to fear of going to sleep. Patient reports having trouble sleeping in room and concern with foam mattress. Patient reports being more comfortable with paper cups and plates. Patient observed utilizing plastic objects, such as phone and eating utensils, with a paper towel. Patient reports feeling uncomfortable having direct contact with plastic surfaces. Patient reports feeling hopeful for future. Patient cooperative and engaged throughout shift with staff and peers.       Problem: Adult Behavioral Health Plan of Care  Goal: Plan of Care Review  Outcome: Progressing  Flowsheets  Taken 9/27/2024 1245 by Jalyn Massey  Progress: improving  Plan of Care Reviewed With: patient  Patient Agreement with Plan of Care: agrees  Outcome Evaluation: Patient denies SI/HI. Patient rates depression as 1/10 and anxietyas 5/10. Patient reports anxiety is high due to fear of going to sleep. Patient reports having trouble sleeping in room and concern with foam mattress.  Taken 9/26/2024 1530 by Chelita Ng  Consent Given to Review Plan with: Mom  Goal: Patient-Specific Goal (Individualization)  Flowsheets  Taken 9/27/2024 1245  Patient Personal Strengths:   expressive of emotions   expressive of needs   family/social support   motivated for treatment   positive attitude   resilient   resourceful  Patient Vulnerabilities:   limited support system   lacks insight into illness   history of unsuccessful treatment   occupational insecurity   adverse childhood experience(s)  Taken 9/25/2024 0915  Patient-Specific Goals (Include Timeframe): Identify 1-3 coping skills, address mental health relapse prevention methods,  complete aftercare plan, complete safety plan, and deny SI/HI prior to discharge.  Individualized Care Needs: Therapist will offer 1-4 therapy sessions, aftercare planning, safety planning, family education, daily groups, and brief CBT/DBT/MI Interventions.  Anxieties, Fears or Concerns: Patient reports that they are scared of the air, water, and environment being contaminated with chemicals.  Goal: Optimized Coping Skills in Response to Life Stressors  Outcome: Progressing  Flowsheets (Taken 9/27/2024 1245)  Optimized Coping Skills in Response to Life Stressors: making progress toward outcome  Intervention: Promote Effective Coping Strategies  Flowsheets (Taken 9/27/2024 1245)  Supportive Measures:   active listening utilized   counseling provided   decision-making supported   journaling promoted   guided imagery facilitated   goal-setting facilitated   positive reinforcement provided   problem-solving facilitated   relaxation techniques promoted   self-care encouraged   self-reflection promoted   self-responsibility promoted   verbalization of feelings encouraged  Goal: Develops/Participates in Therapeutic [place] to Support Successful Transition  Outcome: Progressing  Flowsheets (Taken 9/27/2024 1245)  Develops/Participates in Therapeutic [place] to Support Successful Transition: making progress toward outcome  Intervention: Foster Therapeutic [place]  Flowsheets (Taken 9/27/2024 1245)  Trust Relationship/Rapport:   care explained   choices provided   emotional support provided   empathic listening provided   questions answered   questions encouraged   reassurance provided   thoughts/feelings acknowledged  Intervention: Mutually Develop Transition Plan  Flowsheets  Taken 9/27/2024 1245  Discharge Coordination/Progress:   Patient completed phone assessment with [facility]. Patient was preapproved for [facility] services   admission 10/1/2024 at 1:30 PM. Patient has phone consultation with outpatient therapist  Monday at 11:00 AM.  Transition Support:   community resources reviewed   crisis management plan promoted   crisis management plan verbalized   follow-up care coordinated   follow-up care discussed  Concerns Comments: Patient reports not being consistent with aftercare recommendations in the past for mental health treatment.  Concerns to be Addressed:   adjustment to diagnosis/illness   coping/stress   medication   mental health  [facility], [facility], outpatient therapist  Offered/Gave Vendor List: yes  Taken 9/25/2024 0945  Outpatient/Agency/Support Group Needs: (Crisis Stabilization Unit to ease transition of going back to home.)   case management   outpatient medication management   outpatient counseling   other (see comments)  Transportation Anticipated: family or friend will provide  Anticipated Discharge Disposition: (Crisis Stabilization Unit) other (see comments)  Transportation Concerns: none  Current Discharge Risk: psychiatric illness  Readmission Within the Last 30 Days: current reason for admission unrelated to previous admission  Patient/Family Anticipated Services at Transition:   mental health services   outpatient care  Patient/Family Anticipates Transition to: ([facility]) other (see comments)

## 2024-09-27 NOTE — PROGRESS NOTES
Inpatient Psych Progress Note     Clinician: Rosette Camacho MD  Admission Date: 9/24/2024  11:33 EDT 09/27/24    Behavioral Health Treatment Plan and Problem List: I have reviewed and approved the Behavioral Health Treatment Plan and Problem list.    Allergies  No Known Allergies    Hospital Day: 3 days      Assessment completed within view of staff    History  CC/clinical focus: Paranoia about contamination, poor sleep    Interval HPI: Patient seen and evaluated by me.  Chart reviewed. Discussed with treatment team and unit staff. Overnight, staff reported that pt did not sleep at all (although pt self-reported sleeping approx 2 hours) due to intense fear of contamination from her mattress. She set her beddings on the floor and laid down, but was unable to sleep throughout the night. She declined all PRNs, but was compliant with scheduled medications and requested to go ahead and start Geodon as we had previously discussed. She has been complaining of intermittent nausea this morning. She spent approx 45 minutes pacing around her breakfast tray this morning, but was eventually able to complete approx 75% of her tray. On interview this morning, pt says that she is really tired. She rates current level of anxiety at 4-5/10 and depression at 1/10. She struggled to sleep again last night due to anxiety about contamination. She feels that her anxiety about this started to ramp up close to bedtime once she was alone and no longer had activities to distract her thoughts. Pt says that staff offered her PRNs, but the thought of taking something that she hadn't planned to take further increased her anxiety. She is open to starting low-dose Trazodone tonight to help with sleep. Pt says that breakfast went well for her. She clarifies that she had been pacing around the table to try to keep herself awake, and not out of fear of being poisoned by the food. She tried to push herself this morning by touching the plastic  "utensils through a napkin, and although it made her very uncomfortable she is proud that she was able to take this step. Discussed setting small goals each day to improve her distress tolerance towards plastics and metals - pt is open to staff assisting with this and holding her accountable. Pt reports that heard a voice in her head a few times this morning, both while alone and while in the day room with others. She struggles to elaborate on what the voice was saying. She stresses that the voice is not constantly present, and seems to focus on commenting on things that might contain chemicals, further increasing her anxiety. She denies any adverse effects from her current regimen, and is open to titration over the weekend. She says that she had considered \"working up to get\" an RENNER while at her planned CSU stay following discharge, but she reports that she is hopeful about her current regimen and is no longer interested in RENNER for now. She denies any commands, and no RIS was observed during interview today. She denies current SI/HI/AVH. She reported having intermittent nausea this morning, but this has improved. ROS otherwise as below.    Interval Review of Systems:   General ROS: negative for - fever or malaise  Endocrine ROS: negative for - palpitations  Respiratory ROS: no cough, shortness of breath, or wheezing  Cardiovascular ROS: no chest pain or dyspnea on exertion  Gastrointestinal ROS: no abdominal pain, no black or bloody stools    /80 (BP Location: Left arm, Patient Position: Sitting)   Pulse 57   Temp 97.3 °F (36.3 °C) (Oral)   Resp 17   Ht 165.1 cm (65\")   Wt 50.8 kg (112 lb)   LMP 09/18/2024 (Exact Date)   SpO2 100%   BMI 18.64 kg/m²     Mental Status Exam  Behavior: Cooperative, pleasant, alert  Psychomotor activity: Calm, no involuntary movements observed  Orientation: x4  Mood: \"tired\", \"anxious\"  Affect: mood congruent, constricted, anxious  Thought Process: linear, organized, " goal-directed  Thought Content: Denies current AVH but reports hearing a voice in her head this morning without command and with message similar to her own fears of contamination by chemicals, likely intense intrusive thoughts; no RIS observed during interview; endorses obsessive, delusional belief that plastics, metals, and foam are leeching chemicals that might kill her and appears paranoid about this  Concentration: Fair  Suicidal Ideation: Denies  Homicidal Ideation: Denies  Insight: Limited  Judgement: Limited but improving      Medical Decision Making:   Labs:     Lab Results (last 24 hours)       ** No results found for the last 24 hours. **              Radiology:     Imaging Results (Last 24 Hours)       ** No results found for the last 24 hours. **              EKG:     ECG/EMG Results (most recent)       None             Medications:  fluvoxaMINE, 50 mg, Oral, Nightly  multivitamin with minerals, 1 tablet, Oral, Daily  ziprasidone, 20 mg, Oral, BID With Meals           All medications reviewed.    Assessment and Plan:      OCD with psychotic features  - Continue hospitalization at [facility] for safety and stabilization. Pt is on a voluntary status.  - Appropriate precautions ordered; currently SP3  - Encourage engagement in individual, group, and family therapies as appropriate.  - Collateral as needed. Pt currently declines to sign any ROIs, but will alert staff if she changes her mind.  - Increase Luvox from 50 to 100 mg QHS. Pt may require additional titration on an outpatient basis to achieve optimal response.  - Continue Geodon 20 mg BID w/ meals for psychotic features, obsessive thoughts. Pt requested to start this morning and tolerated first dose without issue. Plan to titrate to 20 mg QAM/40 mg QHS tomorrow, then to 40 mg BID the following day. Will consider further titration based on response and tolerance.   - Start Saphris 5 mg Q8h PRN severe anxiety, psychosis.   - Pt is interested in  step-down to [facility] after discharge. Swer assisting with referral. Pt will also require ongoing outpatient mental health services for therapy and medication management.  - Pt will greatly benefit from engagement in ERP therapy after discharge.     Insomnia, likely secondary to underlying OCD  - Pt has had minimal sleep since admission 9/24. She reports having severe anxiety about chemicals from the mattress poisoning her.   - Start scheduled Trazodone 25 mg QHS, with additional 25 mg available as needed if scheduled dose is ineffective. Pt is extremely cautious about starting new medications, and low dose is intended to minimize risk of side effects. Consider titration as needed.    Malnutrition  - Pt endorses recent weight loss secondary to paranoid delusions about her food being poisoned by chemicals  - Admission BMI 18.64; admission UA w/ 2+ ketones  - Continue to monitor intake at each meal and encourage completion. Will also encourage regular snacks.  - Continue MV w/ minerals, nutritional shake.    Continue hospitalization for safety and stabilization.  Continue current level of Special Precautions (q15 minute checks). Anticipate that pt may be ready for discharge by early-mid next week, although this may need to be extended if pt is unable to sleep, further impairing her ability to challenge delusional thoughts.      This document has been electronically signed by Rosette Camacho MD.  September 27, 2024 11:33 EDT

## 2024-09-27 NOTE — PLAN OF CARE
"Goal Outcome Evaluation:  Plan of Care Reviewed With: patient  Patient Agreement with Plan of Care: agrees   Upon start of shift, pt noted in dayroom watching tv alone. While in dayroom , pt noted to be fixated on television and rarely makes eye contact unless directly addressed. Upon approach for nursing assessment, pt immediately covered mouth but was able to answer questions appropriately.pt denies SI/HI. Rates anxiety 6/10 depression 1/10. Denies pain/discomfort. When asked about appetite, pt reports she is only able to consume \"maybe half of meals\" d/t her fear of being poisoned. Pt recognizes that this fear is not reality, but her mind has convinced her that it is. Offered HS snack and pt refused. Pt did consume carton of milk with routine HS meds. Pt went to room and laid on floor , but never slept, pt came out in dayroom bc being in room made her anxiety worse. Pt offered sleep aid , pt stated \" I think Im going to try to go without meds for right now, but will let you know if I change my mind\". Pt eventually went back to room. While doing nursing rounds short time later, pt asked is she could switch rooms because she felt being in a different room may help alleviate her anxiety. Checked on pt short time later after room switch, noted to be sitting in doorway of room staring at wall. Pt asked again if she would like some medication to help her sleep, pt politely declined and stated \" Im okay, I feel a lot better being in a new room\". Pt is currently still sitting in doorway of room.                                      "

## 2024-09-28 PROCEDURE — 99232 SBSQ HOSP IP/OBS MODERATE 35: CPT | Performed by: SPECIALIST

## 2024-09-28 RX ORDER — ASENAPINE 5 MG/1
5 TABLET SUBLINGUAL EVERY 6 HOURS PRN
Status: DISCONTINUED | OUTPATIENT
Start: 2024-09-28 | End: 2024-10-01 | Stop reason: HOSPADM

## 2024-09-28 RX ADMIN — ASENAPINE MALEATE 5 MG: 5 TABLET SUBLINGUAL at 10:26

## 2024-09-28 RX ADMIN — FLUVOXAMINE MALEATE 100 MG: 50 TABLET ORAL at 20:22

## 2024-09-28 RX ADMIN — ZIPRASIDONE HYDROCHLORIDE 40 MG: 20 CAPSULE ORAL at 17:55

## 2024-09-28 RX ADMIN — Medication 1 TABLET: at 08:17

## 2024-09-28 RX ADMIN — ZIPRASIDONE HYDROCHLORIDE 20 MG: 20 CAPSULE ORAL at 08:17

## 2024-09-28 NOTE — PLAN OF CARE
Goal Outcome Evaluation:  Plan of Care Reviewed With: patient  Patient Agreement with Plan of Care: agrees  Consent Given to Review Plan with: No one at the moment.  Progress: improving  Outcome Evaluation: Therapist met with patient to review care plan and aftercare recommendation; patient agreeable. Therapist and patient walked around the U during session, per patient's request.     Patient reports sleeping well last night. Patient's mood and affect appear calm/bright. Patient reports having less anxiety and no depression. Patient denies SI/HI. Therapist provided with patient with print out education materials on diagnosis.     Problem: Adult Behavioral Health Plan of Care  Goal: Plan of Care Review  Outcome: Progressing  Flowsheets  Taken 9/28/2024 0915 by Jalyn Massey  Plan of Care Reviewed With: patient  Patient Agreement with Plan of Care: agrees  Outcome Evaluation:   Therapist met with patient to review care plan and aftercare recommendation   patient agreeable.  Taken 9/28/2024 0837 by Lizz Patel RN  Progress: improving  Taken 9/26/2024 1530 by Chelita Ng  Consent Given to Review Plan with: Mom  Goal: Patient-Specific Goal (Individualization)  Flowsheets  Taken 9/28/2024 0915  Patient Personal Strengths:   expressive of emotions   expressive of needs   medication/treatment adherence   positive attitude   resilient   resourceful   motivated for treatment   family/social support  Anxieties, Fears or Concerns: None voiced today.  Taken 9/27/2024 1245  Patient Vulnerabilities:   limited support system   lacks insight into illness   history of unsuccessful treatment   occupational insecurity   adverse childhood experience(s)  Taken 9/25/2024 0945  Patient-Specific Goals (Include Timeframe): Identify 1-3 coping skills, address mental health relapse prevention methods, complete aftercare plan, complete safety plan, and deny SI/HI prior to discharge.  Individualized Care Needs: Therapist will  offer 1-4 therapy sessions, aftercare planning, safety planning, family education, daily groups, and brief CBT/DBT/MI Interventions.  Goal: Optimized Coping Skills in Response to Life Stressors  Outcome: Progressing  Flowsheets (Taken 9/28/2024 0915)  Optimized Coping Skills in Response to Life Stressors: making progress toward outcome  Intervention: Promote Effective Coping Strategies  Flowsheets (Taken 9/28/2024 0915)  Supportive Measures:   active listening utilized   counseling provided   decision-making supported   journaling promoted   goal-setting facilitated   guided imagery facilitated   positive reinforcement provided   problem-solving facilitated   relaxation techniques promoted   self-care encouraged   self-reflection promoted   self-responsibility promoted   verbalization of feelings encouraged  Goal: Develops/Participates in Therapeutic [place] to Support Successful Transition  Outcome: Progressing  Flowsheets (Taken 9/28/2024 0915)  Develops/Participates in Therapeutic [place] to Support Successful Transition: making progress toward outcome  Intervention: Foster Therapeutic [place]  Flowsheets (Taken 9/28/2024 0915)  Trust Relationship/Rapport:   care explained   choices provided   emotional support provided   empathic listening provided   reassurance provided   questions encouraged   questions answered   thoughts/feelings acknowledged  Intervention: Mutually Develop Transition Plan  Flowsheets  Taken 9/28/2024 0915  Discharge Coordination/Progress:   Ongoing. [facility] Patient's mother will be available to assist with transportation. Outpatient medication appointment scheduled   outpatient therapy consultation is Monday at 11:00 AM.  Transition Support:   community resources reviewed   crisis management plan promoted   follow-up care coordinated   follow-up care discussed  Concerns Comments: Patient reports struggling with coping  and stress management outside of inpatient setting. Patient reports benefiting from individualized care provided during current stay.  Concerns to be Addressed:   coping/stress   mental health  Offered/Gave Vendor List: yes  Taken 9/27/2024 [id number]  Patient's Choice of Community Agency(s): [facility], [facility], outpatient therapist  Taken 9/25/2024 2297  Outpatient/Agency/Support Group Needs: (Crisis Stabilization Unit to ease transition of going back to home.)   case management   outpatient medication management   outpatient counseling   other (see comments)  Transportation Anticipated: family or friend will provide  Anticipated Discharge Disposition: (Crisis Stabilization Unit) other (see comments)  Transportation Concerns: none  Current Discharge Risk: psychiatric illness  Readmission Within the Last 30 Days: current reason for admission unrelated to previous admission  Patient/Family Anticipated Services at Transition:   mental health services   outpatient care  Patient/Family Anticipates Transition to: ([facility]) other (see comments)

## 2024-09-28 NOTE — PROGRESS NOTES
"      Inpatient Psych Progress Note       Admission Date: 9/24/2024  11:21 EDT 09/28/24    Behavioral Health Treatment Plan and Problem List: I have reviewed and approved the Behavioral Health Treatment Plan and Problem list.    Allergies  No Known Allergies    Hospital Day: 4 days      Assessment completed within view of staff    History  CC/clinical focus: Depression and anxiety    Interval HPI: Patient seen and evaluated by me.  Chart reviewed and case was discussed with the staff who reports that patient has had a good night's sleep last night after medication adjustment.  She still has been showing significant anxiety and mood instability and ongoing psychosis and has been having difficulty performing activities of daily living.  However, she was able to take a shower this morning and on approach, she was seen to be polite and pleasant while interacting with me and she told me that she sleeps on the floor and says she does not sleep in the bed and when I questioned her as to why was that, she stated that toxins coming out of her body interacts with the mattress form and creates bad environment.  Throughout her evaluation, she was seen to be exhibiting significant anxiety as she would use the timer over and over again stating \"I am worried I am worried\".  She mentioned that she took Saphris last night and that it helped her with sleep and actually mentioned that she feels that she is on a good regimen of medication mentioning that she has had at least 10 inpatient psychiatric hospitalization and has been on several different medications and that recently she got out of [facility] but mentioned that she did not even last a month out of that hospital as she felt that her medications were not doing good for her but mentioned that she feels that her current regimen is working good for her.  Her Luvox was increased to 100 mg a day yesterday and she has been able to take the medication without any " "tolerability issues.  She reports that she slept good 8 hours last night after she took Saphris and mentioned that she would like her to get the medication again this morning.  She also reports that she ate breakfast this morning even though she has had issues with eating as she stated that she always feels that there are some toxins of poisons in the food.  She mentioned that she was still having those thoughts this morning but she was able to overcome those thoughts and was able to finish her breakfast.  She later mentioned that she feels that she needs to have a repeat dose of Saphris this morning as she stated that she still has been having auditory hallucinations as of this morning but mentioned that the hallucinations are not command in nature but rather they are derogatory in nature.  She has not shown any agitation and aggression and no self-harm behavior has been reported.  Med Compliant.  ROS otherwise as below.      Interval Review of Systems:   General ROS: negative for - fever or malaise  Endocrine ROS: negative for - palpitations  Respiratory ROS: no cough, shortness of breath, or wheezing  Cardiovascular ROS: no chest pain or dyspnea on exertion  Gastrointestinal ROS: no abdominal pain,no black or bloody stools    /77 (BP Location: Right arm, Patient Position: Sitting)   Pulse 65   Temp 97.3 °F (36.3 °C) (Oral)   Resp 16   Ht 165.1 cm (65\")   Wt 50.8 kg (112 lb)   LMP 09/18/2024 (Exact Date)   SpO2 96%   BMI 18.64 kg/m²     Mental Status Exam Young white female who was casually dressed with fair personal hygiene and appears to be in no acute distress or discomfort.  She was awake and alert and interaction with intact orientation to time, place and person as she was able to tell me the day of the week and month and the year but her mood was anxious and depressed.  Her affect was mood congruent.  Her speech is fluent and goal directed.  She denies any current suicidal or homicidal " ideations though she does report auditory hallucinations, she reports that the hallucinations are not command in nature and rather makes derogatory comments.  She denies any visual hallucinations.  She also has been exhibiting significant paranoia and delusional behavior.  Her insight and judgment remains slightly impaired.         Medical Decision Making:   Labs:     Lab Results (last 24 hours)       ** No results found for the last 24 hours. **              Radiology:     Imaging Results (Last 24 Hours)       ** No results found for the last 24 hours. **              EKG:     ECG/EMG Results (most recent)       None             Medications:  fluvoxaMINE, 100 mg, Oral, Nightly  multivitamin with minerals, 1 tablet, Oral, Daily  traZODone, 25 mg, Oral, Nightly  ziprasidone, 40 mg, Oral, BID With Meals           All medications reviewed.      Assessment and Plan: Patient continues to show persistent dysphoric symptoms and mood instability and particularly anxiety along with some psychosis which has been debilitating and interfering with her level of functioning however, she has been pleasant to work with and has been compliant with treatment recommendations including medication management and will recommend maintaining her current level of precautions and continuing her current medications and we will monitor her response to the medications and make further adjustments as needed    Safe, structured and nurturing environment will be provided    Supportive therapy was provided to the patient.         Continue hospitalization for safety and stabilization.  Continue current level of Special Precautions (q15 minute checks).  Edelmira Ann MD  09/28/24  11:21 EDT

## 2024-09-28 NOTE — PROGRESS NOTES
DATA:    Therapist met with the patient individually. Therapist continues reviewing plan of care and aftercare plan.  The patient was agreeable.    Patient has a consultation with an outpatient therapist, Angie Richards, via telephone Monday, 9/30/2024 at 11:00 AM.     Patient was pre-approved for [facility] with pending admission date being 10/1/24 1:30 PM. Patient's mother is available to assist with transportation to CSU upon discharge.     ASSESSMENT:    Patient was seen for follow up of  OCD with psychotic features.      Today, patient was seen 1-1 on U; Therapist and patient walked around the U during session, per patient's request. The patient's affect appeared bright, mood congruent, thought content normal.     Patient reports sleeping well last night. Patient's mood and affect appear calm/bright. Patient reports having less anxiety and no depression. Patient denies SI/HI. Therapist provided with patient with print out education materials on diagnosis.     Patient reports feeling relieved and hopeful. Patient identified ways to manage triggers related to contamination of chemicals that also assist her with gaining more control over situation. Patient demonstrates more insight into illness and understanding of their capabilities to manage symptoms.     CLINICAL MANEUVERING:    Therapist provided safe, secure environment for patient to share.  Provided reflective listening and psychoeducation.  Assisted patient in processing the above session. Assisted patient in identifying any questions or needs to be addressed today.        Plan:     Patient to remain hospitalized this date.      Treatment team will focus efforts on stabilizing patient's acute symptoms while providing education on healthy coping and crisis management to reduce hospitalizations.   Patient requires daily psychiatrist evaluation and 24/7 nursing supervision to promote patient  safety.     Therapist will offer 1-4 individual sessions, family  education, and appropriate referral.     Therapist recommends continued assessment.      Patient will be going to [facility]'s [facility] upon discharge. Anticipated intake date is 10/1/24 at 1:30 PM. Address is [address] [place] [address].

## 2024-09-28 NOTE — PLAN OF CARE
Problem: Adult Behavioral Health Plan of Care  Goal: Optimized Coping Skills in Response to Life Stressors  Intervention: Promote Effective Coping Strategies  Recent Flowsheet Documentation  Taken 9/27/2024 2100 by Rhoda Rudolph RN  Supportive Measures:   active listening utilized   verbalization of feelings encouraged  Goal: Develops/Participates in Therapeutic [place] to Support Successful Transition  Intervention: Foster Therapeutic [place]  Recent Flowsheet Documentation  Taken 9/27/2024 2100 by Rhoda Rudolph RN  Trust Relationship/Rapport:   care explained   choices provided   emotional support provided   questions answered   thoughts/feelings acknowledged     Problem: Activity and Energy Impairment (Anxiety Signs/Symptoms)  Goal: Optimized Energy Level (Anxiety Signs/Symptoms)  Intervention: Optimize Energy Level  Recent Flowsheet Documentation  Taken 9/27/2024 2100 by Rhoda Rudolph RN  Activity (Behavioral Health): up ad nader     Problem: Mood Impairment (Anxiety Signs/Symptoms)  Goal: Improved Mood Symptoms (Anxiety Signs/Symptoms)  Intervention: Optimize Emotion and Mood  Recent Flowsheet Documentation  Taken 9/27/2024 2100 by Rhoda Rudolph RN  Supportive Measures:   active listening utilized   verbalization of feelings encouraged     Problem: Social, Occupational or Functional Impairment (Anxiety Signs/Symptoms)  Goal: Enhanced Social, Occupational or Functional Skills (Anxiety Signs/Symptoms)  Intervention: Promote Social, Occupational and Functional Ability  Recent Flowsheet Documentation  Taken 9/27/2024 2100 by Rhoda Rudolph RN  Trust Relationship/Rapport:   care explained   choices provided   emotional support provided   questions answered   thoughts/feelings acknowledged     Problem: Mood Impairment (Obsessive-Compulsive Signs/Symptoms)  Goal: Improved Mood Symptoms (Obsessive-Compulsive Signs/Symptoms)  Intervention:  Optimize Emotion and Mood  Recent Flowsheet Documentation  Taken 9/27/2024 2100 by Rhoda Rudolph RN  Supportive Measures:   active listening utilized   verbalization of feelings encouraged   Goal Outcome Evaluation:  Plan of Care Reviewed With: patient  Patient Agreement with Plan of Care: agrees     Progress: improving  Outcome Evaluation: Patient slept off and on in the being of the shift. She continues to sleep on the floor because of paranoia regarding toxins in the mattress on her bed.She was isolative this shift only coming out of her room when asked to come to the med room to take her medications. This nurse tried to provide education this shift but patient was not receptive. She slept a total of 8 hours. Patient took her HS medications as scheduled. She continues to cover her mouth when she is talking  to others and will only take medications out of paper medication cups due to thoughts that plastic has chemicals. She denies SI or HI.

## 2024-09-28 NOTE — PLAN OF CARE
Problem: Adult Behavioral Health Plan of Care  Goal: Plan of Care Review  Outcome: Progressing  Flowsheets  Taken 9/28/2024 0837  Progress: improving  Taken 9/28/2024 0717  Plan of Care Reviewed With: patient  Patient Agreement with Plan of Care: agrees  Goal: Patient-Specific Goal (Individualization)  Outcome: Progressing  Flowsheets (Taken 9/27/2024 1245 by Jalyn Massey)  Patient Personal Strengths:   expressive of emotions   expressive of needs   family/social support   motivated for treatment   positive attitude   resilient   resourceful  Patient Vulnerabilities:   limited support system   lacks insight into illness   history of unsuccessful treatment   occupational insecurity   adverse childhood experience(s)  Goal: Adheres to Safety Considerations for Self and Others  Outcome: Progressing  Flowsheets (Taken 9/25/2024 1736 by Hillary Hanson, RN)  Adheres to Safety Considerations for Self and Others: making progress toward outcome  Intervention: Develop and Maintain Individualized Safety Plan  Recent Flowsheet Documentation  Taken 9/28/2024 0800 by Lizz Patel, RN  Safety Measures: safety rounds completed  Taken 9/28/2024 0717 by Lizz Patel, RN  Safety Measures: safety rounds completed  Goal: Absence of New-Onset Illness or Injury  Outcome: Progressing  Intervention: Identify and Manage Fall Risk  Recent Flowsheet Documentation  Taken 9/28/2024 0800 by Lizz Patel, RN  Safety Measures: safety rounds completed  Taken 9/28/2024 0717 by Lizz Patel, RN  Safety Measures: safety rounds completed  Goal: Optimized Coping Skills in Response to Life Stressors  Outcome: Progressing  Flowsheets (Taken 9/27/2024 1245 by Jalyn Massey)  Optimized Coping Skills in Response to Life Stressors: making progress toward outcome  Goal: Develops/Participates in Therapeutic [place] to Support Successful Transition  Outcome: Progressing  Flowsheets (Taken 9/27/2024 1245 by Shilpa  "Jalyn)  Develops/Participates in Therapeutic [place] to Support Successful Transition: making progress toward outcome   Goal Outcome Evaluation:  Plan of Care Reviewed With: patient  Patient Agreement with Plan of Care: agrees     Progress: improving     Pt reports \"good\" appetite and sleep. Pt denies having any pain. Pt is AAOX4. Pt denies having any depression or anxiety at this time (0-10). Pt denies SI/HI/AVH. Pt remains delusional. Pt verbalizes that there are chemicals in \"everything\". Pt's breakfast was placed on paper plate. Pt given paper cups for water/fluids. Pt stated, \" I ate so much better because of the paper plates\". Pt found to be laying on the floor on top of blankets upon assessment. Pt reports she got about 8 hours of sleep last night, \"I needed it\". Pt with good eye contact. Last BM reported 9/24. Offered miralax, pt declined. Pt denies having any side effects from increase in dose of luvox. Pt reports that the saphiris really helped her rest yesterday. No PRNs given overnight. Pt remains withdrawn and isolative. Appropriate staff interaction. Brightens when interacted with. Pt medication compliant. Attending and participating in groups. Pt allowed staff to paint x1 nail. Pt given pink sponges to  attempt to brush teeth. Pt willing to use mouth wash. Pt attempting to do things out of her comfort zone. Pt performed hygiene this AM. Pt used wash cloth with tooth past on it to \"brush teeth\". Pt reports she only placed shampoo on ends of hair. Pt reports she did not scrub her scalp and only washed her axillas. Pt reports feeling uncomfortable using soap because of possible chemicals. Physician changed saphris to 5mg every 6 hours PRN. Pt given x1 PRN dose of saphris around 1026. Performed group on personal hygiene and preventative dental care. Pt does report she has veneers and is worried about what they are made of.Pt had visit with mom. Visitation went well according to pt.                      "

## 2024-09-29 PROCEDURE — 99232 SBSQ HOSP IP/OBS MODERATE 35: CPT | Performed by: SPECIALIST

## 2024-09-29 RX ADMIN — Medication 1 TABLET: at 08:04

## 2024-09-29 RX ADMIN — ASENAPINE MALEATE 5 MG: 5 TABLET SUBLINGUAL at 16:31

## 2024-09-29 RX ADMIN — FLUVOXAMINE MALEATE 100 MG: 50 TABLET ORAL at 20:33

## 2024-09-29 RX ADMIN — ZIPRASIDONE HYDROCHLORIDE 40 MG: 20 CAPSULE ORAL at 08:04

## 2024-09-29 RX ADMIN — ZIPRASIDONE HYDROCHLORIDE 40 MG: 20 CAPSULE ORAL at 17:51

## 2024-09-29 NOTE — PLAN OF CARE
"Goal Outcome Evaluation:  Plan of Care Reviewed With: patient     Consent Given to Review Plan with: Mother     Outcome Evaluation: Patient did articipate in some 1:1 w/staff. Still isolated self from peers,especially at meal times. Still refusing to complete oral care r/t the \"plastics\" in the toothbrush. Nurse educated patient on iimportance of good oral health and it's benefits systemically w/in the body. She did voice understanding of such. Rates anxiety @ +5,/10 Depression @ +2/10. Denies any SI/HI/AVH.                                "

## 2024-09-29 NOTE — PLAN OF CARE
Goal Outcome Evaluation:      Pt up pacing on the unit at the start of the shift and for much of the shift.  Denied SI or HI.  Rated her anxiety 6 of 10 and her depression 1 of 10.  Pt refused physical assessment stating that she didn't want to do it because the stethoscope is metal.  Paranoid thoughts.  Ate most of her dinner and took HS meds, except she refused her Trazodone stating that she wanted to try to sleep without taking it.  Pt pleasant and cooperative.  Pt has slept all night.

## 2024-09-29 NOTE — PROGRESS NOTES
Inpatient Psych Progress Note       Admission Date: 9/24/2024  11:49 EDT 09/29/24    Behavioral Health Treatment Plan and Problem List: I have reviewed and approved the Behavioral Health Treatment Plan and Problem list.    Allergies  No Known Allergies    Hospital Day: 5 days      Assessment completed within view of staff    History  CC/clinical focus: Depression and anxiety and psychosis    Interval HPI: Patient seen and evaluated by me.  Chart reviewed and case discussed with the staff report patient continues to exhibit significant anxiety and mood instability and psychosis with impairment in her ability to function and perform activities of daily living.  She also has been showing selective compliance with medication as she has refused to most of her doses of Saphris all day yesterday and then also refused to take her trazodone last night.  She was up early this morning and told me that she was able to eat her breakfast though she still has been showing for psychosis particularly with paranoia and delusional behavior.  She tells me that she was hearing voices as soon as this morning but the voices are not command in nature as they stay rather derogatory in nature making comments and telling her that something bad is in the environment.  She tells me that she believes and feels that they are toxic fumes on the unit and her body is admitting some toxins particularly when she comes in contact with anything in anyone.  She tries to cover her mouth with her hand on her forearm while she is talking to me.  She continues to show some obsessive-compulsive disorder symptoms with the frequent handwashing and touching and being particular about food in her environment.  She has not been able to sleep in bed because she once again believes that toxins coming out of of her body are going interactive with the mattress form and as such has been sleeping on the floor.  I encouraged her to take her Saphris throughout the  "day as it is prescribed as as needed basis.  I educated her that will help her not only with her anxiety but also to alleviate some of this negative symptoms particularly with paranoid hallucinations and delusions.  She appears to be polite and pleasant and understandable of the information given to her and yet she far has been finding herself not to be able to comply with treatment recommendations.  Med Compliant.  Patient has been showing selective compliance with her medications particularly in regards to her as needed Saphris as well as her trazodone  ROS otherwise as below.      Interval Review of Systems:   General ROS: negative for - fever or malaise  Endocrine ROS: negative for - palpitations  Respiratory ROS: no cough, shortness of breath, or wheezing  Cardiovascular ROS: no chest pain or dyspnea on exertion  Gastrointestinal ROS: no abdominal pain,no black or bloody stools    /84 (BP Location: Right arm, Patient Position: Sitting)   Pulse 80   Temp 97.3 °F (36.3 °C) (Oral)   Resp 16   Ht 165.1 cm (65\")   Wt 50.8 kg (112 lb)   LMP 09/18/2024 (Exact Date)   SpO2 99%   BMI 18.64 kg/m²     Mental Status Exam  Young white female who was casually dressed with fair personal hygiene and appears to be in no acute distress or discomfort.  She was awake and alert and interaction with intact orientation to time, place and person.  Her mood was anxious and her affect was mood congruent.  Her speech is slow and restricted in content but her behavior was normal.  Her motor activity was regular.  Her thought activity was disorganized with looseness of associations and flight of ideas and paranoid ideations and delusional behavior and auditory hallucinations.  She denies any suicidal or homicidal ideation.  Her short and long-term memories were intact as she was able to recall 3 out of 3 objects after 3 minutes and forward and backward recalls using serial threes were also intact.  Estimate of intelligence " was average as estimated the level of verbal interaction per her insight and judgment remains significantly impaired as she has limitations understanding severity of her illness her capacity for ADL was intact.        Medical Decision Making:   Labs:     Lab Results (last 24 hours)       ** No results found for the last 24 hours. **              Radiology:     Imaging Results (Last 24 Hours)       ** No results found for the last 24 hours. **              EKG:     ECG/EMG Results (most recent)       None             Medications:  fluvoxaMINE, 100 mg, Oral, Nightly  multivitamin with minerals, 1 tablet, Oral, Daily  traZODone, 25 mg, Oral, Nightly  ziprasidone, 40 mg, Oral, BID With Meals           All medications reviewed.      Assessment and Plan: Patient continues to present with persistent psychosis anxiety and dysphoric symptoms and will recommend maintaining her current level of precautions and encouraging her to show better compliance with treatment recommendation particularly with medication management will monitor her response to the medications and make further adjustments as needed.    Safe, structured and nurturing environment will be provided.    Supportive therapy will be provided to the patient.  The send dictation         Continue hospitalization for safety and stabilization.  Continue current level of Special Precautions (q15 minute checks).  Edelmira Ann MD  09/29/24  11:49 EDT

## 2024-09-29 NOTE — NURSING NOTE
Pt refused physical assessment earlier in the shift stating that she didn't want to because the stethoscope is made of metal.

## 2024-09-30 PROCEDURE — 99233 SBSQ HOSP IP/OBS HIGH 50: CPT | Performed by: STUDENT IN AN ORGANIZED HEALTH CARE EDUCATION/TRAINING PROGRAM

## 2024-09-30 RX ORDER — FLUVOXAMINE MALEATE 50 MG
150 TABLET ORAL NIGHTLY
Status: DISCONTINUED | OUTPATIENT
Start: 2024-09-30 | End: 2024-10-01 | Stop reason: HOSPADM

## 2024-09-30 RX ADMIN — ZIPRASIDONE HYDROCHLORIDE 40 MG: 20 CAPSULE ORAL at 08:28

## 2024-09-30 RX ADMIN — Medication 1 TABLET: at 08:29

## 2024-09-30 RX ADMIN — ASENAPINE MALEATE 5 MG: 5 TABLET SUBLINGUAL at 09:24

## 2024-09-30 RX ADMIN — POLYETHYLENE GLYCOL 3350 17 G: 17 POWDER, FOR SOLUTION ORAL at 18:03

## 2024-09-30 RX ADMIN — FLUVOXAMINE MALEATE 150 MG: 50 TABLET ORAL at 20:29

## 2024-09-30 RX ADMIN — ZIPRASIDONE HYDROCHLORIDE 40 MG: 20 CAPSULE ORAL at 17:53

## 2024-09-30 RX ADMIN — TRAZODONE HYDROCHLORIDE 25 MG: 50 TABLET ORAL at 20:29

## 2024-09-30 RX ADMIN — ASENAPINE MALEATE 5 MG: 5 TABLET SUBLINGUAL at 18:52

## 2024-09-30 NOTE — SIGNIFICANT NOTE
09/30/24 1548   Group Therapy Session   Group Attendance attended group session   Time Session Began 1420   Time Session Ended 1500   Total Time (minutes) 40   Group Type life skill;psychoeducation   Group Topic Covered cognitive therapy techniques;other (see comments)  (DBT)   Literature/Videos Given topic handouts   Literature/Videos Given Comments Distress Tolerance Skills   Group Session Detail Therapist provided brief CBT/DBT on developing healthy coping skills. Distress tolerance skills were discussed. Participants had opportunity to integrate knowledge and understanding through group discussion and completion of a handout.   Patient Participation/Contribution cooperative with task;did not discuss personal experience;listened actively   Patient Participation Detail Attentive and completed task without sharing peersonal examples   Affect During Group affect consistent with mood;hopeful;facial expression relaxed   Degree of Insight moderate

## 2024-09-30 NOTE — PROGRESS NOTES
DATA:    Therapist met with the patient individually. Therapist continues reviewing plan of care and aftercare plan.  The patient was agreeable. Therapist provided patient and patient's mother on education resources on OCD and management of symptoms as well as ERP therapy. Patient has intake appointment scheduled with therapist trained in ERP post-discharge. Patient completed therapy consultation with minimal assistance from unit therapist.     Upcoming appointments for patient:    October 1, 2024 1:30 PM  [facility]  [address] [place] [address] [phone]    October 8, 2024 2:30 PM   Therapy intake with Angie Santiago, [facility]  [address], [place] [id number] [phone]    October 16, 2024 9:00 AM   Initial Evaluation for medication management with Khadra Gandhi, [facility]  [address]  [place] [address] [phone]    ASSESSMENT:    Patient was seen for follow up of  OCD with psychotic features    Today, patient was seen 1-1 at bedside. The patient's affect appeared bright, mood congruent, thought content normal. Patient reports depression as 1/10 and anxiety as 2/10. Patient denies HI/SI and denies AVH.     Patient reports anxiety is stemming from her body's reaction to PRN medication. Patient reports that their body needs more time to get used to the medication. Patient demonstrates heightened self-awareness and insight into symptoms. Patient reports feeling more in control of her future and shared short-term goals with therapist that reflect patient empowerment.       CLINICAL MANEUVERING:    Therapist provided safe, secure environment for patient to share.  Provided reflective listening and psychoeducation.  Assisted patient in processing the above session. Assisted patient in identifying any questions or needs to be addressed today.        Plan:     Patient anticipated to be discharged tomorrow; aftercare appointments made.      Patient's mother will assist with transportation; call by 11:00 AM with discharge time.      Treatment team will focus efforts on stabilizing patient's acute symptoms while providing education on healthy coping and crisis management to reduce hospitalizations.   Patient requires daily psychiatrist evaluation and 24/7 nursing supervision to promote patient  safety.    Patient will be going to [facility] upon discharge. Anticipated intake date is 10/1/24 at 1:30 PM. Address is [address] [place] [address].

## 2024-09-30 NOTE — PROGRESS NOTES
"      Inpatient Psych Progress Note       Admission Date: 9/24/2024  11:31 EDT  09/30/24    Behavioral Health Treatment Plan and Problem List: I have reviewed and approved the Behavioral Health Treatment Plan and Problem list.    Allergies  No Known Allergies    Hospital Day: 6 days      Assessment completed within view of staff    History  CC/clinical focus: Anxiety, paranoia about contamination    Interval HPI: Met with pt. Reviewed interim charting. Discussed with treatment team and staff.  No major issues over the weekend. Pt has been compliant except for declining Trazodone yesterday because she was sleeping well. Pt accepted PRN Saphris yesterday and today for severe anxiety. No other PRNs required. Pt has been participating in unit activities without issue. Interactions with peers and staff have been appropriate, although pt is always noted to cover her mouth with her hand as she speaks. On interview today, pt appears much brighter and less hypervigilant compared to when I last saw her on Friday. She says that she is feeling better each day, and thinks that her medications are working well for her. She denies any adverse effects. She currently rates anxiety at 4/10, and denies feeling depressed at all. She has continued to sleep on bedding on the floor due to fear of contamination with chemicals from the mattress. Pt says that if she was forced to sleep on the mattress, she would be able to convince herself it was safe for a little bit, but suspects that her anxiety would eventually \"explode\". Pt states that she has been eating regularly, and has tried using her plastic utensils with the handles wrapped in a napkin to eat. She says that although it makes her uncomfortable to do so, she is proud that she accomplished this. She also allowed for 1 fingernail to be painted with nail polish over the weekend, which she states she would have never even considered prior to admission. Pt admits that \"I still feel just " "fine\" after having the fingernail painted so she knows it is safe. Pt remains interested in CSU following discharge, and has her assessment today. Pt is open to further titration of her medications. She currently denies SI/HI/AVH. Pt denies issues with fever/chills, rash, abnormal movements, muscle stiffness. ROS otherwise as below.      Interval Review of Systems:   General ROS: negative for - fever or malaise  Endocrine ROS: negative for - palpitations  Respiratory ROS: no cough, shortness of breath, or wheezing  Cardiovascular ROS: no chest pain or dyspnea on exertion  Gastrointestinal ROS: no abdominal pain,no black or bloody stools    /70 (BP Location: Left arm, Patient Position: Sitting)   Pulse 60   Temp 97.9 °F (36.6 °C) (Oral)   Resp 16   Ht 165.1 cm (65\")   Wt 50.8 kg (112 lb)   LMP 09/18/2024 (Exact Date)   SpO2 97%   BMI 18.64 kg/m²     Mental Status Exam  Young white female who was casually dressed with fair personal hygiene and appears to be in no acute distress or discomfort.  She was awake and alert and interaction with intact orientation to time, place and person.  Her mood was anxious and her affect was mood congruent.  Her speech is slow and restricted in content but her behavior was normal.  Her motor activity was regular.  Her thought activity was disorganized with looseness of associations and flight of ideas and paranoid ideations and delusional behavior and auditory hallucinations.  She denies any suicidal or homicidal ideation.  Her short and long-term memories were intact as she was able to recall 3 out of 3 objects after 3 minutes and forward and backward recalls using serial threes were also intact.  Estimate of intelligence was average as estimated the level of verbal interaction per her insight and judgment remains significantly impaired as she has limitations understanding severity of her illness her capacity for ADL was intact.    Mental Status Exam  Behavior: " "Cooperative, pleasant, decreased eye contact, sitting on bedding on the floor of her room  Psychomotor activity: Calm, no involuntary movements observed  Orientation: x4  Mood: \"good\"  Affect: Mildly constricted and anxious, but mostly euthymic  Thought Process: linear, organized  Thought Content: Pt continues to endorse delusional beliefs about chemicals leeching from objects, but recognizes that these fears are likely unfounded.   Hallucinations: Denies AVH, no RIS observed  Concentration: Fair  Suicidal Ideation: Denies  Homicidal Ideation: Denies  Insight: Limited but improving  Judgement: Good      Medical Decision Making:   Labs:     Lab Results (last 24 hours)       ** No results found for the last 24 hours. **              Radiology:     Imaging Results (Last 24 Hours)       ** No results found for the last 24 hours. **              EKG:     ECG/EMG Results (most recent)       None             Medications:  fluvoxaMINE, 100 mg, Oral, Nightly  multivitamin with minerals, 1 tablet, Oral, Daily  traZODone, 25 mg, Oral, Nightly  ziprasidone, 40 mg, Oral, BID With Meals           All medications reviewed.      Assessment and Plan:     OCD with psychotic features  - Continue hospitalization at [facility] for safety and stabilization. Pt is on a voluntary status.  - Appropriate precautions ordered; currently SP3  - Encourage engagement in individual, group, and family therapies as appropriate.  - Collateral as needed.   - Increase Luvox 100 to 150 mg QHS for OCD. Pt tolerating well thus far.   - Continue Geodon 40 mg BID w/ meals for psychotic features, obsessive thoughts.   - Continue Saphris 5 mg Q8h PRN severe anxiety, psychosis. Pt appears to be responding well, accepts about 1 dose daily.   - Pt is interested in step-down to CSU after discharge. Swer assisting with referral - assessment is scheduled for today. Pt will also require ongoing outpatient mental health services for therapy and medication " management.  - Pt will greatly benefit from engagement in ERP therapy after discharge.      Insomnia, likely secondary to underlying OCD  - Sleep now improving, and pt not requiring Trazodone to sleep through the night.   - Discontinue scheduled Trazodone 25 mg QHS but continue with PRN dose for sleep disruption     Malnutrition  - Pt endorses recent weight loss secondary to paranoid delusions about her food being poisoned by chemicals  - Admission BMI 18.64; admission UA w/ 2+ ketones  - Continue to monitor intake at each meal and encourage completion. Will also encourage regular snacks. Intake improving.   - Continue MV w/ minerals, nutritional shake.     Pt has CSU assessment today. If it goes well and patient has no major changes overnight, pt may be able to discharge tomorrow to CSU.        Continue hospitalization for safety and stabilization.  Continue current level of Special Precautions (q15 minute checks).  Rosette Camacho MD  09/30/24  11:31 EDT

## 2024-09-30 NOTE — PLAN OF CARE
Goal Outcome Evaluation:      Pt lying in her room on the floor resting at the start of the shift.  Pt stayed in her room most of the evening.  Refused physical assessment as she stated she did not like the metal on the stethoscope.  Denied SI and HI.  Rated her anxiety 4 of 10 and her depression 1 of 10.  Took her HS Luvox, but refused her Trazodone stating that she had slept well the night before without taking it and she doesn't want to have to take it to sleep.  Pt has slept all night thus far.

## 2024-09-30 NOTE — PLAN OF CARE
Goal Outcome Evaluation:  Plan of Care Reviewed With: patient, mother  Patient Agreement with Plan of Care: agrees  Consent Given to Review Plan with: No one at the moment.  Progress: improving  Outcome Evaluation: Therapist met with patient to review care plan and aftercare recommendation; patient agreeable. Patient's mother is going to assist with transportation upon discharge. Patient's mood and affect appear bright. Patient completed therapy consultation with outpatient therapist; appointment scheduled 10/8/2024. Patient reports depression as 1/10 and anxiety as 2/10. Patient denies HI/SI and denies AVH.     Problem: Adult Behavioral Health Plan of Care  Goal: Plan of Care Review  Outcome: Progressing  Flowsheets  Taken 9/30/2024 1105 by Jalyn Massey  Plan of Care Reviewed With:   patient   mother  Patient Agreement with Plan of Care: agrees  Outcome Evaluation:   Therapist met with patient to review care plan and aftercare recommendation   patient agreeable. Patient's mother is going to assist with transportation upon discharge.  Taken 9/29/2024 1347 by Deisy Thacker, RN  Consent Given to Review Plan with: Mother  Taken 9/28/2024 0837 by Lizz Patel, RN  Progress: improving  Goal: Patient-Specific Goal (Individualization)  Outcome: Progressing  Flowsheets  Taken 9/30/2024 1105  Patient Personal Strengths:   expressive of emotions   expressive of needs   coping skills   family/social support   resilient   resourceful   motivated for recovery   motivated for treatment   positive attitude   self-awareness   stable living environment  Patient Vulnerabilities:   limited support system   history of unsuccessful treatment   occupational insecurity   adverse childhood experience(s)  Taken 9/28/2024 0915  Anxieties, Fears or Concerns: None voiced today.  Taken 9/25/2024 0945  Patient-Specific Goals (Include Timeframe): Identify 1-3 coping skills, address mental health relapse prevention methods, complete  aftercare plan, complete safety plan, and deny SI/HI prior to discharge.  Individualized Care Needs: Therapist will offer 1-4 therapy sessions, aftercare planning, safety planning, family education, daily groups, and brief CBT/DBT/MI Interventions.  Goal: Optimized Coping Skills in Response to Life Stressors  Outcome: Progressing  Flowsheets (Taken 9/30/2024 1105)  Optimized Coping Skills in Response to Life Stressors: making progress toward outcome  Intervention: Promote Effective Coping Strategies  Flowsheets (Taken 9/30/2024 1105)  Supportive Measures:   active listening utilized   counseling provided   decision-making supported   journaling promoted   guided imagery facilitated   goal-setting facilitated   positive reinforcement provided   problem-solving facilitated   relaxation techniques promoted   self-responsibility promoted   self-reflection promoted   self-care encouraged   verbalization of feelings encouraged  Goal: Develops/Participates in Therapeutic [place] to Support Successful Transition  Outcome: Progressing  Flowsheets (Taken 9/30/2024 1105)  Develops/Participates in Therapeutic [place] to Support Successful Transition: making progress toward outcome  Intervention: Foster Therapeutic [place]  Flowsheets (Taken 9/30/2024 1105)  Trust Relationship/Rapport:   care explained   choices provided   emotional support provided   empathic listening provided   reassurance provided   questions encouraged   thoughts/feelings acknowledged   questions answered  Intervention: Mutually Develop Transition Plan  Flowsheets  Taken 9/30/2024 1105  Discharge Coordination/Progress: Ongoing. Patient still agreeable to transitioning to [facility] C[facility] upon discharge. Patient's mother is assisting with trnasportation. Outpatient medication management and therapy appointment scheduled.  Transition Support:   community resources reviewed   crisis management plan promoted   crisis management plan verbalized   follow-up  care coordinated   follow-up care discussed  Taken 9/28/2024 0915  Concerns Comments: Patient reports struggling with coping and stress management outside of inpatient setting. Patient reports benefiting from individualized care provided during current stay.  Concerns to be Addressed:   coping/stress   mental health  Offered/Gave Vendor List: yes  Taken 9/27/2024 1245  Patient's Choice of Community Agency(s): [facility], [facility], outpatient therapist  Taken 9/25/2024 0982  Outpatient/Agency/Support Group Needs: (Crisis Stabilization Unit to ease transition of going back to home.)   case management   outpatient medication management   outpatient counseling   other (see comments)  Transportation Anticipated: family or friend will provide  Anticipated Discharge Disposition: (Crisis Stabilization Unit) other (see comments)  Transportation Concerns: none  Current Discharge Risk: psychiatric illness  Readmission Within the Last 30 Days: current reason for admission unrelated to previous admission  Patient/Family Anticipated Services at Transition:   mental health services   outpatient care  Patient/Family Anticipates Transition to: ([facility] Crisis Stabilization Unit) other (see comments)

## 2024-09-30 NOTE — PLAN OF CARE
Problem: Adult Behavioral Health Plan of Care  Goal: Plan of Care Review  Outcome: Progressing  Flowsheets  Taken 9/30/2024 1105 by Jalyn Massey  Plan of Care Reviewed With:   patient   mother  Taken 9/28/2024 0837 by Lizz Patel, RN  Progress: improving  Goal: Patient-Specific Goal (Individualization)  Outcome: Progressing  Goal: Adheres to Safety Considerations for Self and Others  Outcome: Progressing  Flowsheets (Taken 9/25/2024 1736 by Hillary Hanson, RN)  Adheres to Safety Considerations for Self and Others: making progress toward outcome  Intervention: Develop and Maintain Individualized Safety Plan  Recent Flowsheet Documentation  Taken 9/30/2024 1621 by Madeleine Cheema RN  Safety Measures: safety rounds completed  Taken 9/30/2024 1415 by Madeleine Cheema RN  Safety Measures: safety rounds completed  Taken 9/30/2024 1154 by Madeleine Cheema RN  Safety Measures: safety rounds completed  Taken 9/30/2024 1005 by Madeleine Cheema RN  Safety Measures: safety rounds completed  Goal: Absence of New-Onset Illness or Injury  Outcome: Progressing  Intervention: Identify and Manage Fall Risk  Recent Flowsheet Documentation  Taken 9/30/2024 1621 by Madeleine Cheema RN  Safety Measures: safety rounds completed  Taken 9/30/2024 1415 by Madeleine Cheema RN  Safety Measures: safety rounds completed  Taken 9/30/2024 1154 by Madeleine Cheema RN  Safety Measures: safety rounds completed  Taken 9/30/2024 1005 by Madeleine Cheema RN  Safety Measures: safety rounds completed  Goal: Optimized Coping Skills in Response to Life Stressors  Outcome: Progressing  Flowsheets (Taken 9/30/2024 1105 by Jalyn Massey)  Optimized Coping Skills in Response to Life Stressors: making progress toward outcome  Goal: Develops/Participates in Therapeutic [place] to Support Successful Transition  Outcome: Progressing  Flowsheets (Taken 9/30/2024 1105 by Jalyn Massey)  Develops/Participates in Therapeutic [place] to Support Successful  Transition: making progress toward outcome   Goal Outcome Evaluation:            Patient visible in the milieu. Patient denies current SI/HI/AVH. Patient is compliant with medications and actively participates in groups. Patient is cooperative with staff and appropriate with peers.

## 2024-09-30 NOTE — DISCHARGE INSTR - APPOINTMENTS
October 1, 2024 1:30 PM  [facility] St[facility]  [address] [place] [address] [phone]    October 8, 2024 2:30 PM   Therapy intake with Angie Santiago, [address] [address] [phone]

## 2024-10-01 ENCOUNTER — TELEPHONE (OUTPATIENT)
Age: 29
End: 2024-10-01
Payer: OTHER MISCELLANEOUS

## 2024-10-01 VITALS
WEIGHT: 112 LBS | SYSTOLIC BLOOD PRESSURE: 131 MMHG | DIASTOLIC BLOOD PRESSURE: 88 MMHG | RESPIRATION RATE: 17 BRPM | BODY MASS INDEX: 18.66 KG/M2 | HEIGHT: 65 IN | HEART RATE: 65 BPM | TEMPERATURE: 97.9 F | OXYGEN SATURATION: 100 %

## 2024-10-01 PROBLEM — K59.00 CONSTIPATION: Status: ACTIVE | Noted: 2024-10-01

## 2024-10-01 PROCEDURE — 99239 HOSP IP/OBS DSCHRG MGMT >30: CPT | Performed by: STUDENT IN AN ORGANIZED HEALTH CARE EDUCATION/TRAINING PROGRAM

## 2024-10-01 RX ORDER — MULTIPLE VITAMINS W/ MINERALS TAB 9MG-400MCG
1 TAB ORAL DAILY
Qty: 30 EACH | Refills: 0 | Status: SHIPPED | OUTPATIENT
Start: 2024-10-02 | End: 2024-10-01 | Stop reason: HOSPADM

## 2024-10-01 RX ORDER — DIPHENOXYLATE HYDROCHLORIDE AND ATROPINE SULFATE 2.5; .025 MG/1; MG/1
1 TABLET ORAL DAILY
Qty: 30 TABLET | Refills: 0 | Status: SHIPPED | OUTPATIENT
Start: 2024-10-01 | End: 2024-10-31

## 2024-10-01 RX ORDER — ZIPRASIDONE HYDROCHLORIDE 20 MG/1
40 CAPSULE ORAL 2 TIMES DAILY WITH MEALS
Qty: 30 CAPSULE | Refills: 0 | Status: SHIPPED | OUTPATIENT
Start: 2024-10-01

## 2024-10-01 RX ORDER — FLUVOXAMINE MALEATE 50 MG
50 TABLET ORAL NIGHTLY
Qty: 30 TABLET | Refills: 0 | Status: SHIPPED | OUTPATIENT
Start: 2024-10-01

## 2024-10-01 RX ORDER — POLYETHYLENE GLYCOL 3350 17 G/17G
17 POWDER, FOR SOLUTION ORAL DAILY PRN
Qty: 238 G | Refills: 0 | Status: SHIPPED | OUTPATIENT
Start: 2024-10-01

## 2024-10-01 RX ORDER — TRAZODONE HYDROCHLORIDE 50 MG/1
25 TABLET, FILM COATED ORAL NIGHTLY PRN
Qty: 30 TABLET | Refills: 0 | Status: SHIPPED | OUTPATIENT
Start: 2024-10-01 | End: 2024-10-01

## 2024-10-01 RX ORDER — ASENAPINE 5 MG/1
5 TABLET SUBLINGUAL 2 TIMES DAILY PRN
Qty: 60 TABLET | Refills: 0 | Status: SHIPPED | OUTPATIENT
Start: 2024-10-01 | End: 2024-10-01 | Stop reason: HOSPADM

## 2024-10-01 RX ORDER — POLYETHYLENE GLYCOL 3350 17 G/17G
17 POWDER, FOR SOLUTION ORAL ONCE
Status: COMPLETED | OUTPATIENT
Start: 2024-10-01 | End: 2024-10-01

## 2024-10-01 RX ORDER — FLUVOXAMINE MALEATE 50 MG
150 TABLET ORAL NIGHTLY
Qty: 90 TABLET | Refills: 0 | Status: SHIPPED | OUTPATIENT
Start: 2024-10-01 | End: 2024-10-01

## 2024-10-01 RX ORDER — ASENAPINE 5 MG/1
5 TABLET SUBLINGUAL 2 TIMES DAILY PRN
Qty: 60 TABLET | Refills: 0 | Status: SHIPPED | OUTPATIENT
Start: 2024-10-01

## 2024-10-01 RX ORDER — FLUVOXAMINE MALEATE 100 MG
100 TABLET ORAL NIGHTLY
Qty: 30 TABLET | Refills: 0 | Status: SHIPPED | OUTPATIENT
Start: 2024-10-01

## 2024-10-01 RX ORDER — TRAZODONE HYDROCHLORIDE 50 MG/1
25 TABLET, FILM COATED ORAL NIGHTLY PRN
Qty: 30 TABLET | Refills: 0 | Status: SHIPPED | OUTPATIENT
Start: 2024-10-01

## 2024-10-01 RX ADMIN — ZIPRASIDONE HYDROCHLORIDE 40 MG: 20 CAPSULE ORAL at 08:17

## 2024-10-01 RX ADMIN — POLYETHYLENE GLYCOL 3350 17 G: 17 POWDER, FOR SOLUTION ORAL at 09:35

## 2024-10-01 RX ADMIN — Medication 1 TABLET: at 08:17

## 2024-10-01 NOTE — PROGRESS NOTES
Therapist met 1-1 in the group room. Patient calm/cooperative, oriented x 4.  Patient noted to have appropriate affect, congruent mood, normal thought content. Patient denies SI/HI/AVH and acute symptoms.   Safety planning conducted; patient/family denies access to firearms, weapons, medications. Medications were recommended to be safe guarded and for family to administer with patient.     Assisted patient in identifying risk factors which would indicate the need for higher level of care including thoughts to harm self or others and/or self-harming behavior and encouraged patient to call [phone], call 911, or present to the nearest emergency room should any of these events occur. Discussed crisis intervention services and means to access.  Patient adamantly and convincingly denies current suicidal or homicidal ideation or perceptual disturbance.    Therapist completed the following safety plan with the patient       SAFETY/MENTAL HEALTH PLAN    1. Recognizing warning signs: Warning signs that a crisis may be developing such as thoughts, images, mood, situation, behaviors: anxiety towards chemicals to the point of not eating or drinking    2. Internal coping strategies: Things that the patient can do to take their mind off problems without contacting another person such as relaxation techniques, physical activity, etc: walking; watch tv; listening to music; guided meditation    3. Socialization strategies for distraction and support: People and social settings that provide distraction or support - names or places, telephone: working on this; [facility], feeling hopeful     4. Social contact for assistance in resolving crisis: People the patient can ask for help - names and telephone: Mom and Dad    5. Professionals or agencies contacts to help resolve crisis: Professionals or agencies the patient can contact during a crisis - clinician name/location/phone/emergency contact number, local urgent care services with  address/phone, National Suicide Prevention Lifeline (187), Emergency contact 911:    207 707  [facility] [phone]       6. Means restriction: Ways to make the environment safe  Parents have firearms looked up and secure. Medications safeguarded with Mom.       Upcoming Appointments:    October 1, 2024 1:30 PM  [facility]  [facility]- If you call, they will meet you at the door. You spoke with Ochoa.  [address] [id number] [phone]     October 8, 2024 2:30 PM   Therapy intake with Angie Richards, [facility]  [address] [place] [address] [phone]     October 16, 2024 9:00 AM   Initial Evaluation for medication management with IGGY Beltran  [address]  [place] [address] [phone]

## 2024-10-01 NOTE — PLAN OF CARE
Problem: Adult Behavioral Health Plan of Care  Goal: Optimized Coping Skills in Response to Life Stressors  Outcome: Progressing   Goal Outcome Evaluation:  Plan of Care Reviewed With: patient  Patient Agreement with Plan of Care: agrees     Progress: improving  Outcome Evaluation: Patient reports feeling better today. She states she has only had one episode of auditory hallucinations. She said she heard voices warning her about plastics today. She reports that she did use plastic utensils earlier. She admits that she still has very real hallucinations about coming in contact with chemicals. She rates her depression and anxiety 1/4.She is hopeful for discharge today and reports that she has more work ahead of her to get better.

## 2024-10-01 NOTE — TELEPHONE ENCOUNTER
Pt's mother called in. Pharmacy does not have one RX in stock.     asenapine maleate (SAPHRIS) 5 MG sublingual tablet sublingual tablet (10/01/2024)     I adv pt's mom on the process to transfer prescriptions from one pharmacy to another. I adv that she ask the receiving pharmacy if they have rx in stock before transferring

## 2024-10-01 NOTE — DISCHARGE SUMMARY
"    Inpatient Psychiatry Discharge Summary    Date of Admission:  9/24/2024 10:31 PM   Date of Discharge:  10/1/2024    Discharge Diagnosis:Principal Problem:    OCD (obsessive compulsive disorder)  Active Problems:    Malnutrition    Constipation    History of Presenting Illness (per H&P 9/25/24):  Deann Coon is a 29 y.o. female admitted to the [facility] on 9/24/2024. Pt was evaluated by me on 09/25/24.  Patient has a history of multiple previous psychiatric hospitalizations, most recently at [facility] on 8/2024, and per patient's report she has previously received diagnoses of ADHD, OCD, cPTSD, anorexia, and bipolar disorder. Pt presents with severe paranoia about being poisoned.  Patient states that her fear of being poisoned started approximately 1 year ago, when she used paint thinner in the process of repainting a wall in her house.  She started to obsess over the possibility of being harmed by the paint thinner, and is extremely fearful that the paint thinner might have lingered all around the house and may be affecting her food.  She has stopped eating food that is prepared in her house due to fear that she will be poisoned by the paint thinner.  She states that her fear of being poisoned has gotten so great that she is unable to take care of herself, avoids taking showers in the house, and has lost a significant amount of weight due to avoiding \"the chemicals\" in food.  Although she has a history of anorexia, patient reports that this occurred during high school and her recent food restrictions have been solely secondary to her fear of being poisoned by chemicals.  Patient says that she has experienced episodes of intrusive, ruminating, paranoid thoughts in the past, typically related to contamination, but it has never disrupted her life to the extent that it currently is.  Patient states that she is unable to enjoy many activities, isolates away from others, and often feels hopeless due to her " "constant fear of being poisoned.  Patient is somewhat able to recognize that her fears are unreasonable, but at the same time \"the thoughts are so strong that I get convinced that it's the truth\".  Additionally, patient endorses a 4-year history of intermittent auditory hallucinations.  She describes hearing voices inside her head that she does not recognize from real life providing a running commentary on day to day activities.  The voices do not appear to have a gender, and only occur when it is time.  Over the past year, she reports that the voices have become more negative and frightening to her.  She says that they comment on things that might be a threat to her, such as saying that something is poisoned, although they seem to stop short of directly threatening her life for safety.  She denies that the voices have ever given her any commands to harm others or do anything impulsive.  She denies any history of VH or other hallucinatory behavior. Patient states that she was diagnosed with bipolar disorder first during high school and again during her most recent admission at [facility] last month, when she was started on lithium and Risperdal.  She has since stopped taking both medications due to frustration with frequent lab draws on lithium and side effects of dizziness with Risperdal.  She is unsure what symptoms resulted in the diagnosis of bipolar disorder, but suspects that it had something to do with the auditory hallucinations.  She denies any history of prolonged periods with decreased need for sleep, elevated mood, increased energy, flight of ideas, racing thoughts beyond her normal anxiety, pressured speech, impulsivity, grandiosity, and excessive spending.  She reports that her sleep is currently \"really good\", and although she feels like she spends too much of her energy worrying about being poisoned, she feels like she has enough energy to accomplish most of what she wants to do throughout the day.  Patient " reports that she is tired of her fears preventing her from living her life, and is open to engaging in therapy and starting new medications to help manage these fears.  1 option of a long-acting injectable was proposed, patient politely declines due to her fear of plastics and metals going into her body, but says that she is willing to remain compliant with oral medications that do not require frequent lab monitoring.  She also requests to avoid any medications that would significantly increase her weight, but recognizes that she could currently stand to gain a few pounds back.  She currently denies SI/HI/AVH, and reports that she last heard the voices in her head earlier today.     Per collateral obtained from patient's mother by the unit , patient's paranoia has significantly increased over the past 2 weeks.  Patient had been sleeping on the porch at 1 point because she was so afraid of the chemicals getting on her.    Hospital Course:  Patient was admitted for safety and stabilization.  Patient was assigned a master's level therapist and provided with an opportunity to participate in group and individual therapy and counseling on the unit. Pt's delusional thoughts and hallucinations were entirely focused on contamination, and as pt showed no clear thought disorganization or RIS, it was strongly suspected that pt's psychotic features were secondary to severe OCD. She declined to sleep on her mattress due to an irrational fear of chemicals leeching from the mattress and poisoning her, and instead chose to sleep on the ground. She also refused to touch her plastic utensils at mealtime due to identical concern, and would not eat any food or accept any medication that hands had touched. She reported hearing voices inside her head telling her that things were contaminated, but was never observed RIS. As the content of the voices' message strongly mirrored pt's own fears, these voices were suspected to be  pt's own intrusive, obsessive thoughts. Pt was started on Luvox for obsessive thoughts, eventually titrated to 150 mg QHS. She was also started on Geodon for psychotic features and for augmentation of Luvox for OCD, and this was gradually titrated to 40 mg BID w/ meals. PRN Saphris 5 mg was utilized with success for severe anxiety and obsessive, delusional thoughts. Trazodone 25 mg QHS was used for insomnia due to severe anxiety, but was no longer required by the end of pt's hospitalization as pt began sleeping well on her own. Pt tolerated the medications above without adverse effect, and within a few days after initiation, significant improvement in her paranoia was observed and reported. Pt fully engaged in both individual and group therapies, and also challenged herself to set-wise exposures, such as gradually eating with plastic utensils. By the end of her stay, she was still experiencing frequent intrusive thoughts of contamination, but their intensity decreased and pt was able to actively engage in reality checking. Mood steadily brightened over the course of her stay. On day of discharge, pt denied SI/HI/AVH. She presented with bright affect and was confident that she would be able to continue improving after discharge. Pt requested referral to [facility] for step-down care, and was accepted to the program. It was felt that pt had met maximum benefit of hospitalization, and was determined to be safe for discharge from the unit. Safety planning was completed and reviewed with therapist. [facility] and outpatient mental health follow-up was ascertained prior to discharge.     Routine labs were checked upon admission and were unremarkable except for: UA w/ 2+ ketones, 1+ blood, trace bacteria. EKG 9/18/24 showed: Sinus rhythm, no ST elevations, HR 72, Qtc 449 ms. Due to concern for recent weight loss secondary to paranoid delusions of contamination, pt was started on MV and  "supplemental shake. Pt was started on daily PRN Miralax due to constipation.    Consults:   Consults       No orders found from 8/26/2024 to 9/25/2024.            Labs:  Lab Results (all)       None            Imaging:  Imaging Results (All)       None            Mental Status Exam on Day of Discharge  Behavior: Cooperative, pleasant, good eye contact, holding hand in front of mouth while speaking but with less tension than before  Psychomotor activity: Calm, no involuntary movements observed  Orientation: x4  Mood: \"good\"  Affect: mood congruent, full, euthymic  Thought Process: linear, organized, goal-directed  Thought Content: Continues to endorse paranoia about contamination by chemicals, but is able to easily engage in reality testing  Hallucinations: Denies current AVH, no RIS observed  Concentration: Fair  Suicidal Ideation: Denies  Homicidal Ideation: Denies  Insight: Limited but much improved since time of admission  Judgement: Limited but much improved since time of admission    Condition on Discharge:  Stable    Vital Signs  Heart Rate:  [65-90] 65  Resp:  [17] 17  BP: (114-131)/(71-88) 131/88    Discharge Disposition  Home or Self Care - discharging voluntarily to [facility] upon discharge    Discharge Medications     Discharge Medications        New Medications        Instructions Start Date   asenapine maleate 5 MG sublingual tablet sublingual tablet  Commonly known as: SAPHRIS   5 mg, Sublingual, 2 Times Daily PRN      ClearLax 17 GM/SCOOP powder  Generic drug: polyethylene glycol   Take 17 g (1 CAPFUL) by mouth Daily As Needed (constipation).      fluvoxaMINE 50 MG tablet  Commonly known as: LUVOX   50 mg, Oral, Nightly, Take in addition to 100 mg tab for nightly total of 150 mg.      fluvoxaMINE 100 MG tablet  Commonly known as: LUVOX   100 mg, Oral, Nightly      Thera-Tabs tablet tablet  Generic drug: multivitamin   1 tablet, Oral, Daily      traZODone 50 MG tablet  Commonly known as: DESYREL   " 25 mg, Oral, Nightly PRN      ziprasidone 20 MG capsule  Commonly known as: GEODON   40 mg, Oral, 2 Times Daily With Meals             Stop These Medications      albuterol sulfate  (90 Base) MCG/ACT inhaler  Commonly known as: PROVENTIL HFA;VENTOLIN HFA;PROAIR HFA     azithromycin 250 MG tablet  Commonly known as: Zithromax Z-Isak     lansoprazole 30 MG capsule  Commonly known as: PREVACID     loratadine 10 MG tablet  Commonly known as: Claritin     methylPREDNISolone 4 MG dose pack  Commonly known as: MEDROL     mirtazapine 15 MG tablet  Commonly known as: REMERON     montelukast 10 MG tablet  Commonly known as: SINGULAIR              Discharge Diet: Regular    Activity at Discharge: As tolerated    Follow-up Appointments:      Follow up with [facility]  [phone]    OCT    16 Initial Evaluation with Khadra Gandhi  Wednesday Oct 16, 2024 9:00 AM  Bring all previous medical records and films, along with current medications and insurance information. [facility]  [address]  [place] [place] [facility] [id number] [phone]       Additional Information  October 1, 2024 1:30 PM  [facility]  [address] [id number] [phone]     October 8, 2024 2:30 PM   Therapy intake with Angie Santiago [address] [id number] [phone]       Time: I spent 38 minutes on this discharge activity which included: face-to-face encounter with the patient, reviewing the data in the system, coordination of the care with the nursing staff as well as treatment team and pharmacy, documentation, and entering orders.          This document has been electronically signed by Rosette Camacho MD.  October 1, 2024 10:38 EDT

## 2024-10-01 NOTE — PLAN OF CARE
Goal Outcome Evaluation:      Patient denies SI/HI/AVH. Patient cooperative with staff and appropriate interactions with peers. Patient is compliant with medications. Patient is actively participating in unit programming. Patient did request Miralax for constipation this shift but reports no abdominal pain or discomfort. Patient will discharge today.

## 2024-10-01 NOTE — PLAN OF CARE
Problem: Adult Behavioral Health Plan of Care  Goal: Plan of Care Review  10/1/2024 1123 by Madeleine Cheema RN  Outcome: Met  10/1/2024 1046 by Madeleine Cheema RN  Outcome: Progressing  Goal: Patient-Specific Goal (Individualization)  10/1/2024 1123 by Madeleine Cheema RN  Outcome: Met  10/1/2024 1046 by Madeleine Cheema RN  Outcome: Progressing  Flowsheets  Taken 9/30/2024 1105 by Jalyn Massey  Patient Personal Strengths:   expressive of emotions   expressive of needs   coping skills   family/social support   resilient   resourceful   motivated for recovery   motivated for treatment   positive attitude   self-awareness   stable living environment  Patient Vulnerabilities:   limited support system   history of unsuccessful treatment   occupational insecurity   adverse childhood experience(s)  Taken 9/28/2024 0915 by Jalyn Massey  Anxieties, Fears or Concerns: None voiced today.  Taken 9/25/2024 0945 by Jalyn Massey  Patient-Specific Goals (Include Timeframe): Identify 1-3 coping skills, address mental health relapse prevention methods, complete aftercare plan, complete safety plan, and deny SI/HI prior to discharge.  Individualized Care Needs: Therapist will offer 1-4 therapy sessions, aftercare planning, safety planning, family education, daily groups, and brief CBT/DBT/MI Interventions.  Goal: Adheres to Safety Considerations for Self and Others  10/1/2024 1123 by Madeleine Cheema RN  Outcome: Met  10/1/2024 1046 by Madeleine Cheema RN  Outcome: Progressing  Intervention: Develop and Maintain Individualized Safety Plan  Recent Flowsheet Documentation  Taken 10/1/2024 1042 by Madeleine Cheema RN  Safety Measures: safety rounds completed  Goal: Absence of New-Onset Illness or Injury  10/1/2024 1123 by Madeleine Cheema RN  Outcome: Met  10/1/2024 1046 by Madeleine Cheema RN  Outcome: Progressing  Intervention: Identify and Manage Fall Risk  Recent Flowsheet Documentation  Taken 10/1/2024 1042 by Madeleine Cheema  RN  Safety Measures: safety rounds completed  Goal: Optimized Coping Skills in Response to Life Stressors  10/1/2024 1123 by Madeleine Cheema RN  Outcome: Met  10/1/2024 1046 by Madeleine Cheema RN  Outcome: Progressing  Goal: Develops/Participates in Therapeutic [place] to Support Successful Transition  10/1/2024 1123 by Madeleine Cheema RN  Outcome: Met  10/1/2024 1046 by Madeleine Cheema RN  Outcome: Progressing   Goal Outcome Evaluation:            Patient denies SI/HI/AVH. Patient left with all personal belongings and prescription medications filled by hospital pharmacy. Patient was picked up from hospital by Mother.

## 2024-10-16 ENCOUNTER — TELEPHONE (OUTPATIENT)
Age: 29
End: 2024-10-16

## 2024-10-16 ENCOUNTER — OFFICE VISIT (OUTPATIENT)
Age: 29
End: 2024-10-16
Payer: OTHER MISCELLANEOUS

## 2024-10-16 VITALS
HEART RATE: 78 BPM | HEIGHT: 65 IN | OXYGEN SATURATION: 98 % | SYSTOLIC BLOOD PRESSURE: 109 MMHG | WEIGHT: 123 LBS | BODY MASS INDEX: 20.49 KG/M2 | DIASTOLIC BLOOD PRESSURE: 74 MMHG

## 2024-10-16 DIAGNOSIS — F42.2 MIXED OBSESSIONAL THOUGHTS AND ACTS: Primary | ICD-10-CM

## 2024-10-16 RX ORDER — HYDROXYZINE HYDROCHLORIDE 25 MG/1
25 TABLET, FILM COATED ORAL EVERY 4 HOURS PRN
COMMUNITY
Start: 2024-10-03

## 2024-10-16 RX ORDER — ZIPRASIDONE HYDROCHLORIDE 20 MG/1
20 CAPSULE ORAL 2 TIMES DAILY WITH MEALS
Qty: 60 CAPSULE | Refills: 0 | Status: SHIPPED | OUTPATIENT
Start: 2024-10-16 | End: 2024-11-15

## 2024-10-16 RX ORDER — TRAZODONE HYDROCHLORIDE 50 MG/1
50 TABLET, FILM COATED ORAL NIGHTLY
COMMUNITY

## 2024-10-16 NOTE — PROGRESS NOTES
"Chief Complaint: \"I am doing better but still hearing voices.\"    Subjective      Referring Provider: Fawn Peraza APRN    HPI :     Deann Coon presents to [facility] for an initial psychiatric evaluation.     The patient is a 29 y.o. female presenting for an initial psychiatric evaluation to establish care.  She was recently hospitalized in [place] from September 24 through October 1 for severe paranoia related to fear of being poisoned by the chemicals in paint thinner that was used in the house.  She reports the paranoia started about a year and a half ago resulting in fear of eating with significant weight loss, decline in self-care, and isolation.  She reports a history of obsessive thoughts starting when she was in high school.  She reports becoming \"fixated\" on different bands that she liked and changing everything about herself including haircolor, clothing, and getting new piercings to resemble the people in the band.  She reports the obsessive thoughts that she had in high school were not as intense or severe as her recent symptoms that led to hospitalization.  She reports audio hallucinations starting 5 years ago.  She reports the voices are external and derogatory toward her, but denies commanding hallucinations.  She denies disorganized thinking, episodes of decreased need for sleep, elevated mood, grandiosity, pressured speech, or racing thoughts.      She reports a history of psychiatric diagnoses of depression, anxiety, anorexia, and PTSD.  She reports a hospitalization for suicidal ideation without plan or intent when she was a arianne in high school.  She has had 2 recent inpatient psychiatric hospitalizations within the past 2 months related to paranoia.  Past psychiatric medication trials include quetiapine, Wellbutrin, Abilify, sertraline, buspirone, duloxetine, lithium, and escitalopram.  Current psychiatric medications are fluvoxamine 150 mg " daily, trazodone 50 mg every night, ziprasidone 60 mg twice a day, and Saphris 5 mg twice a day as needed for anxiety.    Today she reports a euthymic mood (2 on a 0-10 scale). The patient reports sleeping an average of 7 to 8 hours hours per night. She reports trazodone has been helpful with sleep.  She reports an increase in appetite with weight gain of 10 pounds.  Denies SI/HI/VH.  Endorses audio hallucinations.  She reports hearing voices less than before her hospitalization; however, they are still bothersome.  She has insight related to the current medication regimen and how it has improved the paranoia and obsessive thinking significantly. She reports a desire for continued improvement in symptom management.    Psychiatric Review of Systems: Denies symptoms of gaby, hypomania; endorses psychosis and delusional thinking.  Endorses anxiety, OCD, phobias; denies panic.  Endorses history of eating disorders.  Endorses history of self-harm behavior.  Denies current SI/HI/VH.  Endorses audio hallucinations.  Denies physical, verbal, an sexual abuse. No personality disorders noted at this time.    Past Psychiatric History: She reports a history of psychiatric diagnoses of depression, anxiety, anorexia, and PTSD.  She reports a hospitalization for suicidal ideation without plan or intent when she was a arianne in high school.  She has had 2 recent inpatient psychiatric hospitalizations within the past 2 months related to paranoia.  Past psychiatric medication trials include quetiapine, Wellbutrin, Abilify, sertraline, buspirone, duloxetine, lithium, and escitalopram.  Current psychiatric medications are fluvoxamine 150 mg daily, trazodone 50 mg every night, ziprasidone 60 mg twice a day, and Saphris 5 mg twice a day as needed for anxiety.Endorses history of counseling 3 years 2892-3503 and others didn't stick with it. Denies previous suicide attempts/hospitalized for suicidal ideation when she was 14. Endorses  "self-harming behavior in adolescence.    Substance Use/Abuse: The patient endorses drinking 1 cup of coffee. Denies drinking alcohol. Denies tobacco use.  Endorses past marijuana, delta 8/9 use; last use approximately 2 weeks before hospitalization in August.  Endorse previous Adderall abuse.  Denies cocaine, heroin, methamphetamine, pain pill misuse, and IV drug use. Denies history of formal substance abuse treatment.    Social History: She was born and raised in [place], [place] and was raised in [place] by her biological parents. She has a younger brother. Her highest level of education is trade school diploma in cosmetology.  She received her diploma when she was 23 years old but did not pursue a career due to lack of interest. The patient is currently unemployed.  She describes the relationship with her parents as a supportive relationship.    Family Medical History: She reports family history of hypertension (possibly grandfather) and hypercholesterolemia with both parents.  Denies family history of CAD or cardiac sudden death.      Family Psychiatric History: Past family psychiatric history includes anxiety and depression. Substance use history none.     Trauma History: Denies     Legal History: Denies history of incarceration;  violent with parents in elementary school but \"grew out of it.\"      History: Denies  history.    Past Medical/Developmental History: Past medical, family, and medication history reviewed in Epic as listed.     Head trauma: Denies history of head injury or concussion.    Past Medical History:   Diagnosis Date    Asthma     Bipolar disorder         Review of Systems:    Ears, Nose, Mouth, & Throat: Denies difficulty hearing, smelling, sinus problems, sore throat, or dentition.  Heart/Vascular: Denies rapid heart rate, chest pain, swelling of feet or legs   Respiratory: Denies shortness of breath, cough, or wheeze  GI/: Denies nausea, vomiting, constipation, diarrhea, " abdominal pain, painful urination, frequent urination  MSK: Denies swelling or joint deformities  Skin: Denies lesions or rash  Neurologic: Denies headaches, dizziness, or tremor   Endocrine: Denies heat or cold intolerance   Hematologic: Denies easy bruising or bleeding  Psychiatric: Endorses obsessive thinking, paranoia, hallucinations; denies Insomnia, irritability, depression, anxiety, recurrent bad thoughts, mood swings  Allergic/Immunologic: denies seasonal allergies, hay fever symptoms, itching, frequent infections, exposure to HIV    No Known Allergies     Patient's last menstrual period was 09/18/2024 (exact date).   Birth Control: none, not sexually active     Current Medications:   Current Outpatient Medications   Medication Sig Dispense Refill    asenapine maleate (SAPHRIS) 5 MG sublingual tablet sublingual tablet Place 1 tablet under the tongue 2 (Two) Times a Day As Needed (severe anxiety, psychosis). 60 tablet 0    fluvoxaMINE (LUVOX) 100 MG tablet Take 1 tablet by mouth Every Night. 30 tablet 0    fluvoxaMINE (LUVOX) 50 MG tablet Take 1 tablet by mouth Every Night. Take in addition to 100 mg tab for nightly total of 150 mg. 30 tablet 0    hydrOXYzine (ATARAX) 25 MG tablet Take 1 tablet by mouth Every 4 (Four) Hours As Needed for Anxiety.      multivitamin (THERAGRAN) tablet tablet Take 1 tablet by mouth Daily for 30 days. 30 tablet 0    polyethylene glycol (MIRALAX) 17 GM/SCOOP powder Take 17 g (1 CAPFUL) by mouth Daily As Needed (constipation). 238 g 0    traZODone (DESYREL) 50 MG tablet Take 0.5 tablets by mouth At Night As Needed for Sleep. 30 tablet 0    traZODone (DESYREL) 50 MG tablet Take 1 tablet by mouth Every Night.      ziprasidone (GEODON) 20 MG capsule Take 1 capsule by mouth 2 (Two) Times a Day With Meals for 30 days. Patient has 60mg capsules. Take additional 20mg capsule with 60mg capsule twice per day for a total of 80mg twice per day. Take with food. 60 capsule 0     No current  "facility-administered medications for this visit.       Mental Status Exam: The patient was alert and oriented to time, place, person, and situation. Neatly groomed and dressed. Good eye contact. Cooperative and answers questions willingly. No evidence of gait imbalance or shuffling. Normal speech rate, rhythm, and tone. Reports euthymic mood. Affect congruent with mood.  Denies SI/HI/VH; endorses audio hallucinations. Organized thought process. Endorses/has insight into delusional thinking.  Fair insight, fair judgement. Impulse control intact. Memory intact with average intellectual functioning.      Objective   Vital Signs:   /74   Pulse 78   Ht 165.1 cm (65\")   Wt 55.8 kg (123 lb)   SpO2 98%   BMI 20.47 kg/m²       Result Review :     The following data was reviewed by Khadra Gandhi, DNP, APRN, PMHNP-BC  CMP          9/18/2024    22:44 9/24/2024    20:36   CMP   Glucose 92  141    BUN 12  12    Creatinine 0.74  0.82    EGFR 111.8  98.8    Sodium 137  137    Potassium 3.9  3.7    Chloride 103  99    Calcium 9.7  9.8    Total Protein 7.1     Albumin 4.6     Globulin 2.5     Total Bilirubin 0.4     Alkaline Phosphatase 56     AST (SGOT) 19     ALT (SGPT) 12     Albumin/Globulin Ratio 1.8     BUN/Creatinine Ratio 16.2  14.6    Anion Gap 11.0  12.7      CBC w/diff          9/3/2024    07:33 9/18/2024    22:44 9/24/2024    20:36   CBC w/Diff   WBC 5.70     8.88  4.85    RBC 4.70     4.80  4.86    Hemoglobin 14.2     14.9  14.8    Hematocrit 43.0     43.6  43.6    MCV 92     90.8  89.7    MCH 30.2     31.0  30.5    MCHC 33.0     34.2  33.9    RDW 12.0     11.8  11.7    Platelets 173     256  247    Neutrophil Rel % 69.0     64.5  51.4    Immature Granulocyte Rel % 0.0     0.2  0.2    Lymphocyte Rel % 23.0     25.1  39.4    Monocyte Rel % 6.0     8.4  7.6    Eosinophil Rel % 1.0     1.0  0.6    Basophil Rel % 1.0     0.8  0.8       Details          This result is from an external source.         "       TSH          9/18/2024    22:44 9/24/2024    20:36   TSH   TSH 3.920  1.640      Most Recent A1C          9/3/2024    07:33   HGBA1C Most Recent   Hemoglobin A1C 4.6          Details          This result is from an external source.             Data reviewed : Cardiology studies twelve-lead EKG on September 18, 2024, sinus rhythm, QT interval 410ms, QTcB 449 ms         ECG 12 Lead ED Triage Standing Order; Chest Pain: Patient Communication     Release   Not seen     Results  ECG 12 Lead ED Triage Standing Order; Chest Pain (Order [id number])  Order-Level Documents:    Scan on 9/18/2024 2246 by [employer], [place]: ECG 12-LEAD         Author: -- Service: -- Author Type: --   Filed: Date of Service: Creation Time:   Status: (Other)   HEART RATE=72  bpm  RR Ffkclgsv=296  ms  ME Rcdbrbjs=628  ms  P Horizontal Axis=2  deg  P Front Axis=63  deg  QRSD Interval=85  ms  QT Hhincdxz=351  ms  EOyP=380  ms  QRS Axis=39  deg  T Wave Axis=27  deg  - BORDERLINE ECG -  Sinus rhythm  Borderline  short ME interval  Probable  left atrial enlargement  NO CHANGE  Electronically Signed By: Jatin Vega ([facility]) 2024-09-19 17:20:33  Date and Time of Study:2024-09-18 22:44:55        Signed    Electronically signed by Jatin Vega MD on 9/19/24 at 1720 EDT       ECG 12 Lead ED Triage Standing Order; Chest Pain: Patient Communication     Release   Not seen     Additional Information    Specimen ID Bill Type Client ID   [id number]              Specimen Date Taken Specimen Time Taken Specimen Received Date Specimen Received Time Result Date Result Time   Sep 18, 2024 10:44 PM   Sep 19, 2024  5:20 PM     Results Routing Tracking    View Results Routing Information     Order Report     Order Details      PHQ-9 Score:   PHQ-9 Total Score: 16    PHQ-9 Depression Screening  Little interest or pleasure in doing things? Over half   Feeling down, depressed, or hopeless? Over half   Trouble falling or staying asleep, or sleeping too  much? Several days   Feeling tired or having little energy? Almost all   Poor appetite or overeating? Several days   Feeling bad about yourself - or that you are a failure or have let yourself or your family down? Over half   Trouble concentrating on things, such as reading the newspaper or watching television? Almost all   Moving or speaking so slowly that other people could have noticed? Or the opposite - being so fidgety or restless that you have been moving around a lot more than usual? Over half   Thoughts that you would be better off dead, or of hurting yourself in some way? Not at all   PHQ-9 Total Score 16   If you checked off any problems, how difficult have these problems made it for you to do your work, take care of things at home, or get along with other people? Very difficult         SILVIA-7      Over the last two weeks, how often have you been bothered by the following problems?  Feeling nervous, anxious or on edge: Nearly every day  Not being able to stop or control worrying: Nearly every day  Worrying too much about different things: Nearly every day  Trouble Relaxing: Nearly every day  Being so restless that it is hard to sit still: More than half the days  Becoming easily annoyed or irritable: Not at all  Feeling afraid as if something awful might happen: Nearly every day  SILVIA 7 Total Score: 17  If you checked any problems, how difficult have these problems made it for you to do your work, take care of things at home, or get along with other people: Extremely difficult                   Impression/Treatment Plan:  Deann Coon presents to [facility] for an initial psychiatric evaluation.     The patient patient is a 29-year-old female with a history of obsessive-compulsive disorder, paranoia, and auditory hallucinations.  She reports the audio hallucinations started about 5 years ago and the paranoia started about a year to a year and a half ago.  She  "endorses marijuana and delta 8, delta 9 use 2 weeks prior to her hospitalization at [facility] in August 2024.  She was recently discharged from psychiatric inpatient care at [facility] on October 1, 2024.  Both hospitalizations were result of new onset and worsening paranoia related to fear of poisoning.  The patient became socially withdrawn, stopped eating with significant weight loss due to fear of poisoning, and had a decline in self-care.  Medications upon recent hospital discharge include fluvoxamine 150mg daily, trazodone 25mg every night, ziprasidone 60mg BID, and Saphris 5mg BID as needed for anxiety. The patient reports today that the paranoia and audio hallucinations have significantly improved with the current medication regimen.  However, she continues to hear noncommanding voices and  has some but limited insight about her paranoia.  She reports that her parents have been supportive in helping her recognize paranoid thoughts. She reports that she is interested in increasing dosages of her medications for maximal therapeutic effect, \" to get better\".  We discussed increasing zisprasidone from 60mg BID to 80mg BID today. We discussed reevaluating her symptoms and increasing fluvoxamine at the next appointment if needed.  Safety plan made and scanned in the chart today.  The patient was instructed to call the office with questions or concerns, call the suicide hotline at 988, or go to the nearest emergency department in case of an emergency.  Plan to follow closely.  Follow up for medication management in 1 week. The patient verbalizes understanding and is in agreement with this plan.    Diagnoses and all orders for this visit:    1. Mixed obsessional thoughts and acts (Primary)  -     ziprasidone (GEODON) 20 MG capsule; Take 1 capsule by mouth 2 (Two) Times a Day With Meals for 30 days. Patient has 60mg capsules. Take additional 20mg capsule with 60mg capsule twice per day for a total of 80mg " twice per day. Take with food.  Dispense: 60 capsule; Refill: 0        MEDS ORDERED DURING VISIT:  New Medications Ordered This Visit   Medications    ziprasidone (GEODON) 20 MG capsule     Sig: Take 1 capsule by mouth 2 (Two) Times a Day With Meals for 30 days. Patient has 60mg capsules. Take additional 20mg capsule with 60mg capsule twice per day for a total of 80mg twice per day. Take with food.     Dispense:  60 capsule     Refill:  0       Follow up with Khadra Gandhi, DNP, APRN, PMHNP-BC in this office in 1 week.          PATIENT EDUCATION:  Treatment and medication options discussed during today's visit. Patient acknowledged and verbally consented to continue with current treatment plan and was educated on the importance of compliance with treatment and follow-up appointments. Patient is agreeable to call the office with any worsening of symptoms or onset of side effects. Patient is agreeable to call 911 or go to the nearest ER should he/she begin having SI/HI.    SAFETY PLAN:  Patient was given ample time for questions and fully participated in treatment planning.  Patient was encouraged to call the clinic with any questions or concerns.  Patient was informed of access to emergency care. If patient were to develop any significant symptomatology, suicidal ideation, homicidal ideation, any concerns, or feel unsafe at any time they are to call the clinic and if unable to get immediate assistance should immediately call 911 or go to the nearest emergency room.  The patient is advised to remove or secure (lock away) all lethal weapons (including guns) and sharps (including razors, scissors, knives, etc.).  All medications (including any prescribed and any over the counter medications) should be stored in a safe and secured location that is not obtainable by children/adolescents.  Patient was given an opportunity and encouraged to ask questions about their medication, illness, and treatment. Patient  contracted verbally for the following: If you are experiencing an emotional crisis or have thoughts of harming yourself or others, please go to your nearest local emergency room or call 911. Will continue to re-assess medication response and side effects frequently to establish efficacy and ensure safety. Risks, any black box warnings, side effects, off label usage, and benefits of medication and treatment discussed with patient, along with potential adverse side effects of current and/or newly prescribed medication, alternative treatment options, and OTC medications.  Patient verbalized understanding of potential risks, any off label use of medication, any black box warnings, and any side effects in their own words. The patient verbalized understanding and agreed to comply with the safety plan discussed in their own words.  Patient given the number to the office. Number also available to the 24- hour suicide hotline.      Short-term goals: Continue to build rapport with the patient    Long-term goals: See symptom reduction with changes in medication and therapy    Mikhail reviewed and is appropriate.    Khadra Gandhi, DNP, APRN, PMHNP-BC    Part of this note may be an electronic transcription/translation of spoken language to printed text using the Dragon Dictation System.

## 2024-10-16 NOTE — TELEPHONE ENCOUNTER
Patient called in stated that her medication Ziprasidone hasn't been sent in, Pharmacy is [address].

## 2024-10-23 ENCOUNTER — OFFICE VISIT (OUTPATIENT)
Age: 29
End: 2024-10-23
Payer: OTHER MISCELLANEOUS

## 2024-10-23 VITALS
DIASTOLIC BLOOD PRESSURE: 77 MMHG | OXYGEN SATURATION: 97 % | HEART RATE: 96 BPM | BODY MASS INDEX: 20.83 KG/M2 | SYSTOLIC BLOOD PRESSURE: 111 MMHG | WEIGHT: 125 LBS | HEIGHT: 65 IN

## 2024-10-23 DIAGNOSIS — F42.2 MIXED OBSESSIONAL THOUGHTS AND ACTS: Primary | ICD-10-CM

## 2024-10-23 RX ORDER — HYDROXYZINE HYDROCHLORIDE 25 MG/1
25 TABLET, FILM COATED ORAL EVERY 4 HOURS PRN
Qty: 30 TABLET | Refills: 0 | Status: SHIPPED | OUTPATIENT
Start: 2024-10-23

## 2024-10-23 RX ORDER — TRAZODONE HYDROCHLORIDE 50 MG/1
50 TABLET, FILM COATED ORAL NIGHTLY
Qty: 30 TABLET | Refills: 0 | Status: SHIPPED | OUTPATIENT
Start: 2024-10-23

## 2024-10-23 RX ORDER — ZIPRASIDONE HYDROCHLORIDE 80 MG/1
80 CAPSULE ORAL 2 TIMES DAILY WITH MEALS
Qty: 30 CAPSULE | Refills: 0 | Status: SHIPPED | OUTPATIENT
Start: 2024-10-23

## 2024-10-23 RX ORDER — ASENAPINE 5 MG/1
5 TABLET SUBLINGUAL
Qty: 30 TABLET | Refills: 0 | Status: SHIPPED | OUTPATIENT
Start: 2024-10-23 | End: 2024-11-22

## 2024-10-23 RX ORDER — FLUVOXAMINE MALEATE 50 MG
50 TABLET ORAL NIGHTLY
Qty: 30 TABLET | Refills: 0 | Status: SHIPPED | OUTPATIENT
Start: 2024-10-23 | End: 2024-11-22

## 2024-10-23 RX ORDER — DIPHENOXYLATE HYDROCHLORIDE AND ATROPINE SULFATE 2.5; .025 MG/1; MG/1
1 TABLET ORAL DAILY
Qty: 30 TABLET | Refills: 0 | Status: SHIPPED | OUTPATIENT
Start: 2024-10-23 | End: 2024-11-22

## 2024-10-23 RX ORDER — FLUVOXAMINE MALEATE 100 MG
100 TABLET ORAL NIGHTLY
Qty: 30 TABLET | Refills: 0 | Status: SHIPPED | OUTPATIENT
Start: 2024-10-23 | End: 2024-11-22

## 2024-10-23 NOTE — PROGRESS NOTES
"     Office  Follow Up Visit      Patient Name: Deann Coon  : 1995   MRN: [id number]     Referring Provider: Fawn Peraza APRN    Chief Complaint: \"I am feeling much better.\"      ICD-10-CM ICD-9-CM   1. Mixed obsessional thoughts and acts  F42.2 300.3        History of Present Illness:   Deann Coon is a 29 y.o. female presenting for a routine follow up appointment for psychiatric medication management.  She was initially seen 2024 in this clinic for a history of obsessive-compulsive disorder, paranoia, and auditory hallucinations.  She reported the audio hallucinations started about 5 years ago and the paranoia started about a year to a year and a half ago.  She endorsed marijuana and delta 8, delta 9 use 2 weeks prior to her hospitalization at [facility] in 2024.  She was recently discharged from psychiatric inpatient care at [facility] on 2024.  Both hospitalizations were result of new onset and worsening paranoia related to fear of poisoning.  The patient became socially withdrawn, stopped eating with significant weight loss due to fear of poisoning, and had a decline in self-care.  Medications upon recent hospital discharge include fluvoxamine 150mg daily, trazodone 25mg every night, ziprasidone 60mg BID, and Saphris 5mg BID as needed for anxiety. The patient reported during the initial evaluation that the paranoia and audio hallucinations had significantly improved with the current medication regimen.  However, she continued to hear noncommanding voices and  has some but limited insight about her paranoia.  She reported that her parents have been supportive in helping her recognize paranoid thoughts. She reported that she is interested in increasing dosages of her medications for maximal therapeutic effect, \" to get better\".  We discussed increasing zisprasidone from 60mg BID to 80mg BID today. We discussed reevaluating her symptoms and " increasing fluvoxamine at the next appointment if needed.  Safety plan made and scanned in the chart.      Today she reports a good mood and. The patient reports sleeping an average of 7-8 hours per night waking infrequently, without difficulty falling back to sleep.  She reports no changes in appetite or weight since the last visit. Current psychiatric medications prescribed are Saphris 5 mg p.o. twice daily as needed for anxiety, hydroxyzine 25 mg every 4 hours as needed for anxiety, ziprasidone 80 mg twice daily, trazodone 50 mg nightly, fluvoxamine 150 mg at night.  She reports symptoms have improved, specifically stating that audio hallucinations and intrusive thoughts are less frequent and not as intrusive or aggressive.  She reports taking hydroxyzine 25 mg every 4 hours has significantly decreased her anxiety.  She reports taking asenapine 5 mg at night in addition to trazodone 50 mg at night.  She reports this medication combination helps her fall asleep and stay asleep.  She denies SI/HI/AH.      Subjective      Review of Systems:   Ears, Nose, Mouth, & Throat: Denies difficulty hearing, smelling, sinus problems, sore throat, or dentition.  Heart/Vascular: Denies rapid heart rate, chest pain, swelling of feet or legs   Respiratory: Denies shortness of breath, cough, or wheeze  GI/: Denies nausea, vomiting, constipation, diarrhea, abdominal pain, painful urination, frequent urination  MSK: Denies swelling or joint deformities  Skin: Denies lesions or rash  Neurologic: Denies headaches, dizziness, or tremor   Endocrine: Denies heat or cold intolerance   Hematologic: Denies easy bruising or bleeding  Psychiatric: Endorses audio hallucinations; denies insomnia, irritability, depression, anxiety, recurrent bad thoughts, mood swings, compulsions  Allergic/Immunologic: Endorses seasonal allergies, hay fever symptoms; denies itching, frequent infections, exposure to HIV    PHQ-9 Depression Screening  Little  interest or pleasure in doing things? Almost all   Feeling down, depressed, or hopeless? Over half   PHQ-2 Total Score 5   Trouble falling or staying asleep, or sleeping too much? Several days   Feeling tired or having little energy? Over half   Poor appetite or overeating? Not at all   Feeling bad about yourself - or that you are a failure or have let yourself or your family down? Several days   Trouble concentrating on things, such as reading the newspaper or watching television? Over half   Moving or speaking so slowly that other people could have noticed? Or the opposite - being so fidgety or restless that you have been moving around a lot more than usual? Several days   Thoughts that you would be better off dead, or of hurting yourself in some way? Not at all   PHQ-9 Total Score 12   If you checked off any problems, how difficult have these problems made it for you to do your work, take care of things at home, or get along with other people? Somewhat difficult         SILVIA-7      Over the last two weeks, how often have you been bothered by the following problems?  Feeling nervous, anxious or on edge: More than half the days  Not being able to stop or control worrying: More than half the days  Worrying too much about different things: More than half the days  Trouble Relaxing: More than half the days  Being so restless that it is hard to sit still: Several days  Becoming easily annoyed or irritable: Not at all  Feeling afraid as if something awful might happen: Nearly every day  SILVIA 7 Total Score: 12  If you checked any problems, how difficult have these problems made it for you to do your work, take care of things at home, or get along with other people: Very difficult    Patient History:   The following portions of the patient's history were reviewed and updated as appropriate: allergies, current medications, past family history, past medical history, past social history, past surgical history and problem list.  "    Social History     Socioeconomic History    Marital status: Single   Tobacco Use    Smoking status: Never    Smokeless tobacco: Never   Vaping Use    Vaping status: Never Used   Substance and Sexual Activity    Alcohol use: No    Drug use: Not Currently     Types: Marijuana    Sexual activity: Defer       History reviewed. No pertinent family history.    Medications:     Current Outpatient Medications:     asenapine maleate (SAPHRIS) 5 MG sublingual tablet sublingual tablet, Place 1 tablet under the tongue every night at bedtime for 30 days., Disp: 30 tablet, Rfl: 0    hydrOXYzine (ATARAX) 25 MG tablet, Take 1 tablet by mouth Every 4 (Four) Hours As Needed for Anxiety., Disp: 30 tablet, Rfl: 0    multivitamin (THERAGRAN) tablet tablet, Take 1 tablet by mouth Daily for 30 days., Disp: 30 tablet, Rfl: 0    polyethylene glycol (MIRALAX) 17 GM/SCOOP powder, Take 17 g (1 CAPFUL) by mouth Daily As Needed (constipation)., Disp: 238 g, Rfl: 0    traZODone (DESYREL) 50 MG tablet, Take 1 tablet by mouth Every Night., Disp: 30 tablet, Rfl: 0    fluvoxaMINE (LUVOX) 100 MG tablet, Take 1 tablet by mouth Every Night for 30 days., Disp: 30 tablet, Rfl: 0    fluvoxaMINE (LUVOX) 50 MG tablet, Take 1 tablet by mouth Every Night for 30 days., Disp: 30 tablet, Rfl: 0    ziprasidone (GEODON) 80 MG capsule, Take 1 capsule by mouth 2 (Two) Times a Day With Meals., Disp: 30 capsule, Rfl: 0    Objective     Physical Exam:  Vital Signs:   Vitals:    10/23/24 0802   BP: 111/77   Pulse: 96   SpO2: 97%   Weight: 56.7 kg (125 lb)   Height: 165.1 cm (65\")     Body mass index is 20.8 kg/m².     Mental Status Exam:   The patient was alert and oriented to time, place, person, and situation. Neatly groomed and dressed. Good eye contact. Cooperative and answers questions willingly. No evidence of gait imbalance or shuffling. Normal speech rate, rhythm, and tone. Reports good mood. Affect congruent with mood.  Endorses audio hallucinations; denies " "SI/HI/VH. Organized thought process. No evidence of delusional thinking.  Fair insight, fair judgement. Impulse control intact. Memory intact with average intellectual functioning.     No Known Allergies       Current Medications:   Current Outpatient Medications   Medication Sig Dispense Refill    asenapine maleate (SAPHRIS) 5 MG sublingual tablet sublingual tablet Place 1 tablet under the tongue every night at bedtime for 30 days. 30 tablet 0    hydrOXYzine (ATARAX) 25 MG tablet Take 1 tablet by mouth Every 4 (Four) Hours As Needed for Anxiety. 30 tablet 0    multivitamin (THERAGRAN) tablet tablet Take 1 tablet by mouth Daily for 30 days. 30 tablet 0    polyethylene glycol (MIRALAX) 17 GM/SCOOP powder Take 17 g (1 CAPFUL) by mouth Daily As Needed (constipation). 238 g 0    traZODone (DESYREL) 50 MG tablet Take 1 tablet by mouth Every Night. 30 tablet 0    fluvoxaMINE (LUVOX) 100 MG tablet Take 1 tablet by mouth Every Night for 30 days. 30 tablet 0    fluvoxaMINE (LUVOX) 50 MG tablet Take 1 tablet by mouth Every Night for 30 days. 30 tablet 0    ziprasidone (GEODON) 80 MG capsule Take 1 capsule by mouth 2 (Two) Times a Day With Meals. 30 capsule 0     No current facility-administered medications for this visit.       @RESULASTCBCDIFFPANEL,TSH,LABLIPI,[id number],ZZIOGVDC98,MG,FOLATE,PROLACTIN,CRPRESULT,CMP,[id number])@    Lab Results   Component Value Date    GLUCOSE 141 (H) 09/24/2024    BUN 12 09/24/2024    CREATININE 0.82 09/24/2024    EGFR 98.8 09/24/2024    BCR 14.6 09/24/2024    K 3.7 09/24/2024    CO2 25.3 09/24/2024    CALCIUM 9.8 09/24/2024    ALBUMIN 4.6 09/18/2024    BILITOT 0.4 09/18/2024    AST 19 09/18/2024    ALT 12 09/18/2024       Lab Results   Component Value Date    WBC 4.85 09/24/2024    HGB 14.8 09/24/2024    HCT 43.6 09/24/2024    MCV 89.7 09/24/2024     09/24/2024       No results found for: \"CHOL\", \"CHLPL\", \"TRIG\", \"HDL\", \"LDL\"               Assessment / Plan      Impression/Treatment " Plan:  The patient is a 29-year-old female presenting today for a follow-up for medication management.  She reports that her symptoms have significantly improved since the last visit.  She reports audio hallucinations and intrusive thoughts are less frequent and less intrusive/aggressive.  She reports sleeping 7 to 8 hours and waking feeling rested.  She is taking hydroxyzine 25 mg every 4 hours routinely, as this has ameliorated her anxiety.  She takes asenapine 5 mg at night to help her stay asleep, as she reports that this makes her drowsy during the day.  We discussed changing her prescription of asenapine 5 mg twice daily as needed for anxiety to 5 mg at night.  She reports that she is eating 350 sara with her ziprasidone.  She requested a refill on a multivitamin.  We discussed continuing all other medications as prescribed at this time and following up in 2 weeks.  She denies SI/HI/VH.  The patient was instructed to call the office with any questions or concerns or go to the nearest emergency department in case of an emergency.  No further questions or concerns at this time.  Patient verbalizes understanding and is in agreement with this plan.    Diagnoses and all orders for this visit:    1. Mixed obsessional thoughts and acts (Primary)  -     fluvoxaMINE (LUVOX) 100 MG tablet; Take 1 tablet by mouth Every Night for 30 days.  Dispense: 30 tablet; Refill: 0  -     fluvoxaMINE (LUVOX) 50 MG tablet; Take 1 tablet by mouth Every Night for 30 days.  Dispense: 30 tablet; Refill: 0  -     hydrOXYzine (ATARAX) 25 MG tablet; Take 1 tablet by mouth Every 4 (Four) Hours As Needed for Anxiety.  Dispense: 30 tablet; Refill: 0  -     traZODone (DESYREL) 50 MG tablet; Take 1 tablet by mouth Every Night.  Dispense: 30 tablet; Refill: 0  -     ziprasidone (GEODON) 80 MG capsule; Take 1 capsule by mouth 2 (Two) Times a Day With Meals.  Dispense: 30 capsule; Refill: 0  -     asenapine maleate (SAPHRIS) 5 MG sublingual tablet  sublingual tablet; Place 1 tablet under the tongue every night at bedtime for 30 days.  Dispense: 30 tablet; Refill: 0  -     multivitamin (THERAGRAN) tablet tablet; Take 1 tablet by mouth Daily for 30 days.  Dispense: 30 tablet; Refill: 0        MEDS ORDERED DURING VISIT:  New Medications Ordered This Visit   Medications    fluvoxaMINE (LUVOX) 100 MG tablet     Sig: Take 1 tablet by mouth Every Night for 30 days.     Dispense:  30 tablet     Refill:  0    fluvoxaMINE (LUVOX) 50 MG tablet     Sig: Take 1 tablet by mouth Every Night for 30 days.     Dispense:  30 tablet     Refill:  0    hydrOXYzine (ATARAX) 25 MG tablet     Sig: Take 1 tablet by mouth Every 4 (Four) Hours As Needed for Anxiety.     Dispense:  30 tablet     Refill:  0    traZODone (DESYREL) 50 MG tablet     Sig: Take 1 tablet by mouth Every Night.     Dispense:  30 tablet     Refill:  0    ziprasidone (GEODON) 80 MG capsule     Sig: Take 1 capsule by mouth 2 (Two) Times a Day With Meals.     Dispense:  30 capsule     Refill:  0    asenapine maleate (SAPHRIS) 5 MG sublingual tablet sublingual tablet     Sig: Place 1 tablet under the tongue every night at bedtime for 30 days.     Dispense:  30 tablet     Refill:  0    multivitamin (THERAGRAN) tablet tablet     Sig: Take 1 tablet by mouth Daily for 30 days.     Dispense:  30 tablet     Refill:  0       Follow up with Khadra Gandhi, KHUSHBU, APRN, PMHNP-BC in this office in 2 weeks.

## 2024-10-24 ENCOUNTER — TELEPHONE (OUTPATIENT)
Age: 29
End: 2024-10-24

## 2024-10-24 NOTE — TELEPHONE ENCOUNTER
She has been on since ziprasidone for a while.  We increased the dosage from 60 mg twice daily to 80mg twice daily this week.  I am not sure why insurance would not cover it.  Can we find out if it can be covered with the prescription written differently?  Thank you

## 2024-11-01 DIAGNOSIS — F25.9 SCHIZOAFFECTIVE DISORDER, UNSPECIFIED TYPE: Primary | ICD-10-CM

## 2024-11-01 DIAGNOSIS — F42.2 MIXED OBSESSIONAL THOUGHTS AND ACTS: ICD-10-CM

## 2024-11-01 RX ORDER — ZIPRASIDONE HYDROCHLORIDE 20 MG/1
20 CAPSULE ORAL 2 TIMES DAILY WITH MEALS
Qty: 60 CAPSULE | Refills: 0 | Status: SHIPPED | OUTPATIENT
Start: 2024-11-01 | End: 2024-11-01

## 2024-11-01 RX ORDER — ZIPRASIDONE HYDROCHLORIDE 80 MG/1
80 CAPSULE ORAL 2 TIMES DAILY WITH MEALS
Qty: 60 CAPSULE | Refills: 0 | Status: SHIPPED | OUTPATIENT
Start: 2024-11-01 | End: 2024-12-01

## 2024-11-01 RX ORDER — ZIPRASIDONE HYDROCHLORIDE 60 MG/1
60 CAPSULE ORAL 2 TIMES DAILY WITH MEALS
Qty: 60 CAPSULE | Refills: 0 | Status: SHIPPED | OUTPATIENT
Start: 2024-11-01 | End: 2024-11-01

## 2024-11-01 NOTE — TELEPHONE ENCOUNTER
I talked to the patient on the phone today. I resent the prescription and told her to call the office on Monday if she has any trouble getting it filled. Thank you.

## 2024-11-01 NOTE — TELEPHONE ENCOUNTER
Spoke with patient on the phone regarding refill request. Insurance company requiring new diagnosis to approve zisprasidone. Diagnosis changed from mixed obsessional thoughts to schizoaffective, unspecified and refill order sent to [facility] on file. Pt reports that she has enough medication to get through the weekend. She was instructed to call office on Monday if she is unable to get her prescription refilled this weekend.

## 2024-11-01 NOTE — TELEPHONE ENCOUNTER
Patient called and wanted to make you aware that she will be running out of the medication soon.    The medication will need to be changed to 60mg and 20 mg tablets making it 2 prescriptions to equal the 80mg.    She wants to know what should she do?

## 2024-11-13 ENCOUNTER — OFFICE VISIT (OUTPATIENT)
Age: 29
End: 2024-11-13
Payer: OTHER MISCELLANEOUS

## 2024-11-13 ENCOUNTER — TELEPHONE (OUTPATIENT)
Age: 29
End: 2024-11-13

## 2024-11-13 VITALS
DIASTOLIC BLOOD PRESSURE: 79 MMHG | HEIGHT: 65 IN | BODY MASS INDEX: 20.83 KG/M2 | OXYGEN SATURATION: 98 % | WEIGHT: 125 LBS | SYSTOLIC BLOOD PRESSURE: 116 MMHG | HEART RATE: 81 BPM

## 2024-11-13 DIAGNOSIS — F31.60 BIPOLAR AFFECTIVE DISORDER, MIXED: Primary | ICD-10-CM

## 2024-11-13 RX ORDER — CARIPRAZINE 3 MG/1
CAPSULE, GELATIN COATED ORAL
COMMUNITY
Start: 2024-11-08 | End: 2024-11-15

## 2024-11-13 NOTE — PROGRESS NOTES
"     Office  Follow Up Visit      Patient Name: Deann Coon  : 1995   MRN: [id number]     Referring Provider: Fawn Peraza APRN    Chief Complaint: \"I have been in the hospital but feeling better now.\"      ICD-10-CM ICD-9-CM   1. Bipolar affective disorder, mixed  F31.60 296.60        History of Present Illness:   Deann Coon is a 29 y.o. female presenting for a routine follow up appointment for psychiatric medication management. Deann Coon is a 29 y.o. female presenting for a routine follow up appointment for psychiatric medication management.  She was initially seen 2024 in this clinic for a history of obsessive-compulsive disorder, paranoia, and auditory hallucinations.  She reported the audio hallucinations started about 5 years ago and the paranoia started about a year to a year and a half ago.  She endorsed marijuana and delta 8, delta 9 use 2 weeks prior to her hospitalization at [facility] in 2024.  She was recently discharged from psychiatric inpatient care at [place] on 2024.  Both hospitalizations were result of new onset and worsening paranoia related to fear of poisoning.  The patient became socially withdrawn, stopped eating with significant weight loss due to fear of poisoning, and had a decline in self-care.  Medications upon recent hospital discharge include fluvoxamine 150mg daily, trazodone 25mg every night, ziprasidone 60mg BID, and Saphris 5mg BID as needed for anxiety. The patient reported during the initial evaluation that the paranoia and audio hallucinations had significantly improved with the current medication regimen.  However, she continued to hear noncommanding voices and  has some but limited insight about her paranoia.  She reported that her parents have been supportive in helping her recognize paranoid thoughts. She reported that she is interested in increasing dosages of her medications for maximal " "therapeutic effect, \" to get better\".  We discussed increasing zisprasidone from 60mg BID to 80mg BID today. We discussed reevaluating her symptoms and increasing fluvoxamine at the next appointment if needed.  Safety plan made and scanned in the chart.       Today the patient reports having an episode with voices, experiencing anxiety, and feelings of hopelessness. The patient had started taking allergy medication, Zyrtec, which was suspected might have been interacting with Luvox. She started taking Zyrtec on October 23rd and experienced worsening symptoms by the following Monday night. She considered overdosing but decided to go to the hospital instead. The patient's medications were switched, causing further anxiety, but she is trying to engage in activities to overcome her fears, such as bleaching her hair and painting her nails. The patient was prescribed Prozac (20 mg) and Vraylar 3 mg daily, which she started taking last week while hospitalized. She has not taken Prozac before and reports an improvement in mood, but still experiences frequent auditory hallucinations. Prior to hospital admission, the hallucinations were aggressive, but they have since become less intense.  She currently denies SI/HI/VH.  Endorses audio hallucinations.    The patient has a history of bipolar diagnosis and reports instances of impulsive behavior, such as driving to [place]. She denies any family history of bipolar disorder and reports no recent substance use. The patient is not experiencing suicidal thoughts or thoughts of harming others but wants the hallucinations to be less frequent. She reports difficulty sleeping at home and is seeking an alternative to trazodone for sleep assistance. The patient mentions sleeping better in the hospital than at home.    Subjective      Review of Systems:   Ears, Nose, Mouth, & Throat: Denies difficulty hearing, smelling, sinus problems, sore throat, or dentition.  Heart/Vascular: Denies " rapid heart rate, chest pain, swelling of feet or legs   Respiratory: Denies shortness of breath, cough, or wheeze  GI/: Denies nausea, vomiting, constipation, diarrhea, abdominal pain, painful urination, frequent urination  MSK: Denies swelling or joint deformities  Skin: Denies lesions or rash  Neurologic: Denies headaches, dizziness, or tremor   Endocrine: Denies heat or cold intolerance   Hematologic: Denies easy bruising or bleeding  Psychiatric: See above    PHQ-9 Depression Screening  Little interest or pleasure in doing things? Almost all   Feeling down, depressed, or hopeless? Almost all   PHQ-2 Total Score 6   Trouble falling or staying asleep, or sleeping too much? Over half   Feeling tired or having little energy? Almost all   Poor appetite or overeating? Several days   Feeling bad about yourself - or that you are a failure or have let yourself or your family down? Almost all   Trouble concentrating on things, such as reading the newspaper or watching television? Almost all   Moving or speaking so slowly that other people could have noticed? Or the opposite - being so fidgety or restless that you have been moving around a lot more than usual? Over half   Thoughts that you would be better off dead, or of hurting yourself in some way? Several days   PHQ-9 Total Score 21   If you checked off any problems, how difficult have these problems made it for you to do your work, take care of things at home, or get along with other people? Very difficult         SILVIA-7      Over the last two weeks, how often have you been bothered by the following problems?  Feeling nervous, anxious or on edge: Nearly every day  Not being able to stop or control worrying: Nearly every day  Worrying too much about different things: Several days  Trouble Relaxing: Nearly every day  Being so restless that it is hard to sit still: More than half the days  Becoming easily annoyed or irritable: Several days  Feeling afraid as if  something awful might happen: Nearly every day  SILVIA 7 Total Score: 16  If you checked any problems, how difficult have these problems made it for you to do your work, take care of things at home, or get along with other people: Very difficult    Patient History:   The following portions of the patient's history were reviewed and updated as appropriate: allergies, current medications, past family history, past medical history, past social history, past surgical history and problem list.     Social History     Socioeconomic History    Marital status: Single   Tobacco Use    Smoking status: Never    Smokeless tobacco: Never   Vaping Use    Vaping status: Never Used   Substance and Sexual Activity    Alcohol use: Yes     Comment: sometimes    Drug use: Not Currently     Types: Marijuana    Sexual activity: Defer       History reviewed. No pertinent family history.    Medications:     Current Outpatient Medications:     asenapine maleate (SAPHRIS) 5 MG sublingual tablet sublingual tablet, Place 1 tablet under the tongue every night at bedtime for 30 days., Disp: 30 tablet, Rfl: 0    hydrOXYzine (ATARAX) 25 MG tablet, Take 1 tablet by mouth Every 4 (Four) Hours As Needed for Anxiety., Disp: 30 tablet, Rfl: 0    multivitamin (THERAGRAN) tablet tablet, Take 1 tablet by mouth Daily for 30 days., Disp: 30 tablet, Rfl: 0    polyethylene glycol (MIRALAX) 17 GM/SCOOP powder, Take 17 g (1 CAPFUL) by mouth Daily As Needed (constipation)., Disp: 238 g, Rfl: 0    Vraylar 3 MG capsule capsule, TAKE 1 CAPSULE BY MOUTH AT BEDTIME FOR BIPOLAR., Disp: , Rfl:     fluvoxaMINE (LUVOX) 100 MG tablet, Take 1 tablet by mouth Every Night for 30 days. (Patient not taking: Reported on 11/13/2024), Disp: 30 tablet, Rfl: 0    fluvoxaMINE (LUVOX) 50 MG tablet, Take 1 tablet by mouth Every Night for 30 days. (Patient not taking: Reported on 11/13/2024), Disp: 30 tablet, Rfl: 0    traZODone (DESYREL) 50 MG tablet, Take 1 tablet by mouth Every Night.  "(Patient not taking: Reported on 11/13/2024), Disp: 30 tablet, Rfl: 0    ziprasidone (GEODON) 80 MG capsule, Take 1 capsule by mouth 2 (Two) Times a Day With Meals for 30 days. (Patient not taking: Reported on 11/13/2024), Disp: 60 capsule, Rfl: 0    Objective     Physical Exam:  Vital Signs:   Vitals:    11/13/24 1305   BP: 116/79   Pulse: 81   SpO2: 98%   Weight: 56.7 kg (125 lb)   Height: 165.1 cm (65\")     Body mass index is 20.8 kg/m².     Mental Status Exam:   The patient was alert and oriented to time, place, person, and situation. Neatly groomed and dressed. Good eye contact. Cooperative and answers questions willingly. No evidence of gait imbalance or shuffling. Normal speech rate, rhythm, and tone. Reports anxious mood. Affect congruent with mood.  Endorses audio hallucinations; denies SI/HI/VH. Organized thought process. No evidence of delusional thinking.  Fair insight, fair judgement. Impulse control intact. Memory intact with average to above average intellectual functioning.     No Known Allergies     Current Medications:   Current Outpatient Medications   Medication Sig Dispense Refill    asenapine maleate (SAPHRIS) 5 MG sublingual tablet sublingual tablet Place 1 tablet under the tongue every night at bedtime for 30 days. 30 tablet 0    hydrOXYzine (ATARAX) 25 MG tablet Take 1 tablet by mouth Every 4 (Four) Hours As Needed for Anxiety. 30 tablet 0    multivitamin (THERAGRAN) tablet tablet Take 1 tablet by mouth Daily for 30 days. 30 tablet 0    polyethylene glycol (MIRALAX) 17 GM/SCOOP powder Take 17 g (1 CAPFUL) by mouth Daily As Needed (constipation). 238 g 0    Vraylar 3 MG capsule capsule TAKE 1 CAPSULE BY MOUTH AT BEDTIME FOR BIPOLAR.      fluvoxaMINE (LUVOX) 100 MG tablet Take 1 tablet by mouth Every Night for 30 days. (Patient not taking: Reported on 11/13/2024) 30 tablet 0    fluvoxaMINE (LUVOX) 50 MG tablet Take 1 tablet by mouth Every Night for 30 days. (Patient not taking: Reported on " "11/13/2024) 30 tablet 0    traZODone (DESYREL) 50 MG tablet Take 1 tablet by mouth Every Night. (Patient not taking: Reported on 11/13/2024) 30 tablet 0    ziprasidone (GEODON) 80 MG capsule Take 1 capsule by mouth 2 (Two) Times a Day With Meals for 30 days. (Patient not taking: Reported on 11/13/2024) 60 capsule 0     No current facility-administered medications for this visit.       @RESULASTCBCDIFFPANEL,TSH,LABLIPI,OJABSDBZ38,FEMWXUCN11,MG,FOLATE,PROLACTIN,CRPRESULT,CMP,[id number])@    Lab Results   Component Value Date    GLUCOSE 141 (H) 09/24/2024    BUN 12 09/24/2024    CREATININE 0.82 09/24/2024    EGFR 98.8 09/24/2024    BCR 14.6 09/24/2024    K 3.7 09/24/2024    CO2 25.3 09/24/2024    CALCIUM 9.8 09/24/2024    ALBUMIN 4.6 09/18/2024    BILITOT 0.4 09/18/2024    AST 19 09/18/2024    ALT 12 09/18/2024       Lab Results   Component Value Date    WBC 4.85 09/24/2024    HGB 14.8 09/24/2024    HCT 43.6 09/24/2024    MCV 89.7 09/24/2024     09/24/2024       No results found for: \"CHOL\", \"CHLPL\", \"TRIG\", \"HDL\", \"LDL\"               Assessment / Plan      Impression/Treatment Plan:  The patient reports experiencing frequent auditory hallucinations, anxiety, and feelings of hopelessness, with some improvement in mood since starting Prozac and Vraylar last week. She has a history of bipolar disorder and impulsive behavior, and is concerned about Prozac potentially inducing gaby. The patient is dissatisfied with trazodone for sleep and is seeking alternatives. There is a suspected interaction between Zyrtec and Luvox, which has been discontinued. She is engaging in exposure therapy to overcome her fear of chemicals. Follow-up is scheduled in 4 weeks to reassess the treatment plan.    1. Bipolar disorder, mixed episode  - Continue Vraylar, increase dose to 4.5 mg at night (1.5 mg + 3 mg tablets) Sample given: 1 box Vraylar 1.5 mg x 7 days; patient has 3 mg tablets at home.  - Monitor for improvement in mood " symptoms  - Educate patient on potential side effects, including extrapyramidal symptoms and dry mouth  - Follow up in 1 week to assess response to increased Vraylar dose  - Discussed with patient that Vraylar is effective for mixed episodes with both depressive and manic symptoms. Common side effects associated with this medication include dry mouth, constipation, indigestion, weight gain, and dizziness. Use caution when going from a sitting to standing position due to potential dizziness. There is a potential for weight gain while using this medication with an increased risk for diabetes and high cholesterol. Additionally, there is a risk of developing involuntary movements of the head, neck, and body that can be irreversible. Severe side effects include hyperglycemia requiring emergent care, a rare but serious skin condition (DRESS syndrome) causing a rash, itching, fatigue, and fever. Rarely, a condition called neuroleptic malignant syndrome (NMS) can occur with symptoms of fever, muscle rigidity, changes in mental status, blood pressure and kidney function. Call the office with any questions or concerns or go to the ED Emergency department in case of an emergency.       2. Possible bipolar disorder  - Patient has a history of bipolar diagnosis and reports impulsive behavior.  - Monitor for signs of gaby induced by Prozac, as it can trigger manic episodes in bipolar patients.  - Continue Vraylar, as it may help balance mood in bipolar patients.  - Patient acknowledges past impulsive behavior, such as driving to [place] spontaneously.    3. Social support  - Confirmed patient is currently living with parents who are supportive in her mental health care    Follow-up appointment scheduled for next Wednesday (1 week).        Follow-up:  - Schedule a follow-up appointment in 1 week to assess the effectiveness of the current treatment plan and make any necessary adjustments.  - Encourage the patient to contact the  clinic if any concerning side effects or symptoms arise before the follow-up appointment.    Diagnoses and all orders for this visit:    1. Bipolar affective disorder, mixed (Primary)        MEDS ORDERED DURING VISIT:  No orders of the defined types were placed in this encounter.      Follow up with Khadra Gandhi, KHUSHBU, APRN, PMSHAQUILLEP-BC in this office in 1 week.          PATIENT EDUCATION:  Discussed medication options and treatment plan of prescribed medication(s) as well as the risks, benefits, and potential side effects. Patient is agreeable to call the office with any worsening of symptoms or onset of side effects. Patient is agreeable to call 911 or go to the nearest ER should he/she begin having SI/HI.    SAFETY PLAN:  Patient was given ample time for questions and fully participated in treatment planning.  Patient was encouraged to call the clinic with any questions or concerns.  Patient was informed of access to emergency care. If patient were to develop any significant symptomatology, suicidal ideation, homicidal ideation, any concerns, or feel unsafe at any time they are to call the clinic and if unable to get immediate assistance should immediately call 911 or go to the nearest emergency room.  The patient is advised to remove or secure (lock away) all lethal weapons (including guns) and sharps (including razors, scissors, knives, etc.).  All medications (including any prescribed and any over the counter medications) should be stored in a safe and secured location that is not obtainable by children/adolescents.  Patient was given an opportunity and encouraged to ask questions about their medication, illness, and treatment. Patient contracted verbally for the following: If you are experiencing an emotional crisis or have thoughts of harming yourself or others, please go to your nearest local emergency room or call 911. Will continue to re-assess medication response and side effects frequently to  establish efficacy and ensure safety. Risks, any black box warnings, side effects, off label usage, and benefits of medication and treatment discussed with patient, along with potential adverse side effects of current and/or newly prescribed medication, alternative treatment options, and OTC medications.  Patient verbalized understanding of potential risks, any off label use of medication, any black box warnings, and any side effects in their own words. The patient verbalized understanding and agreed to comply with the safety plan discussed in their own words.  Patient given the number to the office. Number also available to the 24- hour suicide hotline.     Short Term Goals: Continue building rapport with the patient. Patient will be compliant with medication, and patient will have no significant medication related side effects.  Patient will be engaged in psychotherapy as indicated.  Patient will report subjective improvement of symptoms.     Long term goals: To stabilize mood and treat/improve subjective symptoms, the patient will stay out of the hospital, the patient will be at an optimal level of functioning, and the patient will take all medications as prescribed.     Mikhail reviewed and is appropriate.    Copied text in this note has been reviewed and is accurate as of 11/15/2024.    Part of this note may be an electronic transcription/translation of spoken language to printed text using the Dragon Dictation System.    Khadra Gandhi, KHUSHBU, APRN, PMHNP-BC

## 2024-11-13 NOTE — TELEPHONE ENCOUNTER
Patient medication has been denied (Ziprasidone). She has an appointment tomorrow if you want to discuss other options with her.    Thank you

## 2024-11-15 PROBLEM — F31.60 BIPOLAR AFFECTIVE DISORDER, MIXED: Status: ACTIVE | Noted: 2024-11-15

## 2024-11-20 ENCOUNTER — OFFICE VISIT (OUTPATIENT)
Age: 29
End: 2024-11-20
Payer: OTHER MISCELLANEOUS

## 2024-11-20 VITALS
SYSTOLIC BLOOD PRESSURE: 105 MMHG | HEIGHT: 65 IN | DIASTOLIC BLOOD PRESSURE: 72 MMHG | WEIGHT: 129 LBS | OXYGEN SATURATION: 98 % | BODY MASS INDEX: 21.49 KG/M2 | HEART RATE: 79 BPM

## 2024-11-20 DIAGNOSIS — G47.00 INSOMNIA, UNSPECIFIED TYPE: ICD-10-CM

## 2024-11-20 DIAGNOSIS — F42.2 MIXED OBSESSIONAL THOUGHTS AND ACTS: ICD-10-CM

## 2024-11-20 DIAGNOSIS — F31.60 BIPOLAR AFFECTIVE DISORDER, MIXED: Primary | ICD-10-CM

## 2024-11-20 RX ORDER — TRAZODONE HYDROCHLORIDE 50 MG/1
50 TABLET, FILM COATED ORAL NIGHTLY
Qty: 30 TABLET | Refills: 0 | Status: SHIPPED | OUTPATIENT
Start: 2024-11-20

## 2024-11-20 RX ORDER — HYDROXYZINE HYDROCHLORIDE 25 MG/1
25 TABLET, FILM COATED ORAL EVERY 4 HOURS PRN
Qty: 30 TABLET | Refills: 0 | Status: SHIPPED | OUTPATIENT
Start: 2024-11-20

## 2024-11-20 NOTE — PROGRESS NOTES
"     Office  Follow Up Visit      Patient Name: Deann Coon  : 1995   MRN: [id number]     Referring Provider: Fawn Peraza APRN    Chief Complaint: \"I am doing much better.\"      ICD-10-CM ICD-9-CM   1. Bipolar affective disorder, mixed  F31.60 296.60   2. Mixed obsessional thoughts and acts  F42.2 300.3   3. Insomnia, unspecified type  G47.00 780.52        History of Present Illness:   Deann Coon is a 29 y.o. female presenting for a routine follow up appointment for psychiatric medication management. She was initially seen 2024 in this clinic for a history of obsessive-compulsive disorder, paranoia, and auditory hallucinations.  She reported the audio hallucinations started about 5 years ago and the paranoia started about a year to a year and a half ago.  She endorsed marijuana and delta 8, delta 9 use 2 weeks prior to her hospitalization at [facility] in 2024.  She was recently discharged from psychiatric inpatient care at [facility] on 2024.  Both hospitalizations were result of new onset and worsening paranoia related to fear of poisoning.  The patient became socially withdrawn, stopped eating with significant weight loss due to fear of poisoning, and had a decline in self-care.  Medications upon recent hospital discharge include fluvoxamine 150mg daily, trazodone 25mg every night, ziprasidone 60mg BID, and Saphris 5mg BID as needed for anxiety. The patient reported during the initial evaluation that the paranoia and audio hallucinations had significantly improved with the current medication regimen.  However, she continued to hear noncommanding voices and  has some but limited insight about her paranoia.  She reported that her parents have been supportive in helping her recognize paranoid thoughts. She reported that she is interested in increasing dosages of her medications for maximal therapeutic effect, \" to get better\".  We discussed " increasing zisprasidone from 60mg BID to 80mg BID today. We discussed reevaluating her symptoms and increasing fluvoxamine at the next appointment if needed.  Safety plan made and scanned in the chart.       The patient reported having an episode with voices, experiencing anxiety, and feelings of hopelessness. The patient had started taking allergy medication, Zyrtec, which was suspected might have been interacting with Luvox. She started taking Zyrtec on October 23rd and experienced worsening symptoms by the following Monday night. She considered overdosing but decided to go to the hospital instead. The patient's medications were switched, causing further anxiety, but she is trying to engage in activities to overcome her fears, such as bleaching her hair and painting her nails. The patient was prescribed Prozac (20 mg) and Vraylar 3 mg daily, which she started taking while hospitalized. She has not taken Prozac before and reports an improvement in mood, but still experiences frequent auditory hallucinations. Prior to hospital admission, the hallucinations were aggressive, but they have since become less intense.  She currently denies SI/HI/VH.  Endorses audio hallucinations.     The patient has a history of bipolar diagnosis and reported instances of impulsive behavior, such as driving to [place]. She denied any family history of bipolar disorder and reported no recent substance use. The patient was not experiencing suicidal thoughts or thoughts of harming others. Vraylar was increased from 3 mg to 4.5 mg daily and during the last appointment 1 week ago. She continued Prozac 20 mg daily.    Today the patient reports feeling more level and stable since the last visit. The patient states she is not sleeping well, lying in bed for eight hours but experiencing interrupted and poor-quality sleep. She has been taking 4.5 milligrams of Vraylar, which has been helpful, but she is open to other options if deemed more  beneficial.     The patient reports a decrease in hallucinations, still hearing voices but to a lesser extent, and no recent visual hallucinations. She denies any thoughts of self-harm or harm to others. The patient describes her mood as level, with ongoing depression and anxiety but more manageable than before. She continues to experience fear of chemicals and have difficulty discerning what is real and what is not. The patient acknowledges the need for therapy and has scheduled an appointment for December 5th.    The patient previously took trazodone, which helped with sleep and overall daily functioning. She has not taken trazodone since her first appointment with the provider and has noticed a decline in sleep quality since discontinuing the medication. The patient has been taking Vraylar at night, but it has not improved her sleep. She has been using hydroxyzine twice a day at 25 milligrams per dose for anxiety.      Subjective      Review of Systems:   Ears, Nose, Mouth, & Throat: Denies difficulty hearing, smelling, sinus problems, sore throat, or dentition.  Heart/Vascular: Denies rapid heart rate, chest pain, swelling of feet or legs   Respiratory: Denies shortness of breath, cough, or wheeze  GI/: Denies nausea, vomiting, constipation, diarrhea, abdominal pain, painful urination, frequent urination  MSK: Denies swelling or joint deformities  Skin: Denies lesions or rash  Neurologic: Denies headaches, dizziness, or tremor   Endocrine: Denies heat or cold intolerance   Hematologic: Denies easy bruising or bleeding  Psychiatric: Endorses insomnia, audio hallucinations, recurrent bad thoughts; denies irritability, depression, anxiety,  mood swings, hallucinations, compulsions    PHQ-9 Depression Screening  Little interest or pleasure in doing things? Almost all   Feeling down, depressed, or hopeless? Several days   PHQ-2 Total Score 4   Trouble falling or staying asleep, or sleeping too much? Almost all    Feeling tired or having little energy? Almost all   Poor appetite or overeating? Almost all   Feeling bad about yourself - or that you are a failure or have let yourself or your family down? Almost all   Trouble concentrating on things, such as reading the newspaper or watching television? Almost all   Moving or speaking so slowly that other people could have noticed? Or the opposite - being so fidgety or restless that you have been moving around a lot more than usual? Over half   Thoughts that you would be better off dead, or of hurting yourself in some way? Not at all   PHQ-9 Total Score 21   If you checked off any problems, how difficult have these problems made it for you to do your work, take care of things at home, or get along with other people? Very difficult         SILVIA-7      Over the last two weeks, how often have you been bothered by the following problems?  Feeling nervous, anxious or on edge: More than half the days  Not being able to stop or control worrying: Nearly every day  Worrying too much about different things: More than half the days  Trouble Relaxing: Nearly every day  Being so restless that it is hard to sit still: More than half the days  Becoming easily annoyed or irritable: Several days  Feeling afraid as if something awful might happen: Nearly every day  SILVIA 7 Total Score: 16  If you checked any problems, how difficult have these problems made it for you to do your work, take care of things at home, or get along with other people: Very difficult    Patient History:   The following portions of the patient's history were reviewed and updated as appropriate: allergies, current medications, past family history, past medical history, past social history, past surgical history and problem list.     Social History     Socioeconomic History    Marital status: Single   Tobacco Use    Smoking status: Never    Smokeless tobacco: Never   Vaping Use    Vaping status: Never Used   Substance and  "Sexual Activity    Alcohol use: Yes     Comment: sometimes    Drug use: Not Currently     Types: Marijuana    Sexual activity: Defer       History reviewed. No pertinent family history.    Medications:     Current Outpatient Medications:     hydrOXYzine (ATARAX) 25 MG tablet, Take 1 tablet by mouth Every 4 (Four) Hours As Needed for Anxiety., Disp: 30 tablet, Rfl: 0    multivitamin (THERAGRAN) tablet tablet, Take 1 tablet by mouth Daily for 30 days., Disp: 30 tablet, Rfl: 0    polyethylene glycol (MIRALAX) 17 GM/SCOOP powder, Take 17 g (1 CAPFUL) by mouth Daily As Needed (constipation)., Disp: 238 g, Rfl: 0    traZODone (DESYREL) 50 MG tablet, Take 1 tablet by mouth Every Night., Disp: 30 tablet, Rfl: 0    Cariprazine HCl (VRAYLAR) 1.5 MG capsule capsule, Take 1 capsule by mouth Daily for 7 days., Disp: 7 capsule, Rfl: 0    Cariprazine HCl (VRAYLAR) 4.5 MG capsule capsule, Take 1 capsule by mouth Daily for 30 days., Disp: 30 capsule, Rfl: 0    FLUoxetine (PROzac) 20 MG capsule, Take 1 capsule by mouth Daily., Disp: , Rfl:     Objective     Physical Exam:  Vital Signs:   Vitals:    11/20/24 0752   BP: 105/72   Pulse: 79   SpO2: 98%   Weight: 58.5 kg (129 lb)   Height: 165.1 cm (65\")     Body mass index is 21.47 kg/m².     Mental Status Exam:   The patient was alert and oriented to time, place, person, and situation. Neatly groomed and dressed. Good eye contact. Cooperative and answers questions willingly. No evidence of gait imbalance or shuffling. Normal speech rate, rhythm, and tone. Reports good/euthymic mood. Affect congruent with mood.  Denies SI/HI/VH.  Endorses audio hallucinations; improving with increased Vraylar dose.  Organized thought process. No evidence of delusional thinking.  Fair insight, fair judgement. Impulse control intact. Memory intact with average to above average intellectual functioning.     No Known Allergies       Current Medications:   Current Outpatient Medications   Medication Sig " "Dispense Refill    hydrOXYzine (ATARAX) 25 MG tablet Take 1 tablet by mouth Every 4 (Four) Hours As Needed for Anxiety. 30 tablet 0    multivitamin (THERAGRAN) tablet tablet Take 1 tablet by mouth Daily for 30 days. 30 tablet 0    polyethylene glycol (MIRALAX) 17 GM/SCOOP powder Take 17 g (1 CAPFUL) by mouth Daily As Needed (constipation). 238 g 0    traZODone (DESYREL) 50 MG tablet Take 1 tablet by mouth Every Night. 30 tablet 0    Cariprazine HCl (VRAYLAR) 1.5 MG capsule capsule Take 1 capsule by mouth Daily for 7 days. 7 capsule 0    Cariprazine HCl (VRAYLAR) 4.5 MG capsule capsule Take 1 capsule by mouth Daily for 30 days. 30 capsule 0    FLUoxetine (PROzac) 20 MG capsule Take 1 capsule by mouth Daily.       No current facility-administered medications for this visit.       @RESULASTCBCDIFFPANEL,TSH,LABLIPI,VUKCAZPJ37,UVMIAJCR23,MG,FOLATE,PROLACTIN,CRPRESULT,CMP,[id number])@    Lab Results   Component Value Date    GLUCOSE 141 (H) 09/24/2024    BUN 12 09/24/2024    CREATININE 0.82 09/24/2024    EGFR 98.8 09/24/2024    BCR 14.6 09/24/2024    K 3.7 09/24/2024    CO2 25.3 09/24/2024    CALCIUM 9.8 09/24/2024    ALBUMIN 4.6 09/18/2024    BILITOT 0.4 09/18/2024    AST 19 09/18/2024    ALT 12 09/18/2024       Lab Results   Component Value Date    WBC 4.85 09/24/2024    HGB 14.8 09/24/2024    HCT 43.6 09/24/2024    MCV 89.7 09/24/2024     09/24/2024       No results found for: \"CHOL\", \"CHLPL\", \"TRIG\", \"HDL\", \"LDL\"               Assessment / Plan      Impression/Treatment Plan:  The patient reports feeling more level and stable on her current medication regimen, including Prozac and Vraylar, but is experiencing poor sleep quality since discontinuing trazodone. She has decreased auditory hallucinations and no visual hallucinations. The patient expresses ongoing depression, anxiety, and fear of chemicals but finds these symptoms more manageable. The plan includes reintroducing trazodone 25-50 mg at night for " "sleep, continuing Vraylar 4.5 mg and Prozac 20 mg daily, and encouraging therapy for additional support. Follow-up is scheduled in two weeks.  week or two. She also indicates that she is willing to try adding trazodone back into her medication regimen to improve sleep quality.    1. Depression and Anxiety:  - Patient reports feeling more level and stable on the current medication regimen, including Prozac and Vraylar.  - Patient describes mood as \"pretty level\" but still experiences some depression and anxiety.  - Plan: Continue Prozac 20 mg daily and hydroxyzine 25 mg every 4 hours as needed for anxiety as prescribed.     2. Insomnia:  - Patient reports difficulty falling asleep and poor quality sleep since discontinuing trazodone.  - Patient states trazodone was helping with sleep and daily functioning when previously prescribed.  - Plan: Reintroduce trazodone at 25-50 mg at night as needed for sleep. Suggest starting with half a pill (25 mg) to see if it's sufficient. Monitor for any worsening of hallucinations or mood symptoms and advise the patient to call if any concerns arise.    3. Hallucinations:  - Patient reports a decrease in hallucinations while on Vraylar, but still experiences some auditory hallucinations. Visual hallucinations have not recurred.  - Plan: Continue Vraylar at 4.5 mg daily. Consider increasing the dose in the future if hallucinations do not continue to improve. Provide additional 7 day sample of Vraylar to ensure continuity of treatment.    4. Fear of chemicals and reality discernment:  - Patient acknowledges difficulty in discerning reality and plans to attend therapy for additional support.  - Patient reports fears still feel real but are more manageable than before.  - Plan: Encourage patient to attend scheduled therapy appointment on December 5th and explore additional coping strategies for managing fears and reality discernment.    5. Medication refills and monitoring:  - Patient " needs refills for Vraylar and trazodone. Verify if hydroxyzine refill is needed.  - Patient reports taking hydroxyzine 25 mg twice daily.  - Plan: Prescribe a one-month supply of Vraylar at 4.5 mg daily and trazodone at 25-50 mg at night as needed for sleep. Assess the need for hydroxyzine refill and prescribe if necessary.    6. Follow-up:  - Patient is currently stable and improving on the current treatment plan.  - Plan: Schedule a follow-up appointment in two weeks to monitor progress and make any necessary adjustments to the treatment plan. Instruct the patient to call if any concerns or worsening of symptoms occur before the next appointment.  Diagnoses and all orders for this visit:    1. Bipolar affective disorder, mixed (Primary)  -     Cariprazine HCl (VRAYLAR) 4.5 MG capsule capsule; Take 1 capsule by mouth Daily for 30 days.  Dispense: 30 capsule; Refill: 0  -     Cariprazine HCl (VRAYLAR) 1.5 MG capsule capsule; Take 1 capsule by mouth Daily for 7 days.  Dispense: 7 capsule; Refill: 0    2. Mixed obsessional thoughts and acts  -     Cariprazine HCl (VRAYLAR) 4.5 MG capsule capsule; Take 1 capsule by mouth Daily for 30 days.  Dispense: 30 capsule; Refill: 0  -     hydrOXYzine (ATARAX) 25 MG tablet; Take 1 tablet by mouth Every 4 (Four) Hours As Needed for Anxiety.  Dispense: 30 tablet; Refill: 0    3. Insomnia, unspecified type  -     traZODone (DESYREL) 50 MG tablet; Take 1 tablet by mouth Every Night.  Dispense: 30 tablet; Refill: 0        MEDS ORDERED DURING VISIT:  New Medications Ordered This Visit   Medications    Cariprazine HCl (VRAYLAR) 4.5 MG capsule capsule     Sig: Take 1 capsule by mouth Daily for 30 days.     Dispense:  30 capsule     Refill:  0    traZODone (DESYREL) 50 MG tablet     Sig: Take 1 tablet by mouth Every Night.     Dispense:  30 tablet     Refill:  0    hydrOXYzine (ATARAX) 25 MG tablet     Sig: Take 1 tablet by mouth Every 4 (Four) Hours As Needed for Anxiety.     Dispense:   30 tablet     Refill:  0    Cariprazine HCl (VRAYLAR) 1.5 MG capsule capsule     Sig: Take 1 capsule by mouth Daily for 7 days.     Dispense:  7 capsule     Refill:  0       Follow up with Khadra Gandhi, KHUSHBU, APRN, PMHNP-BC in this office in 2 weeks.          PATIENT EDUCATION:  Discussed medication options and treatment plan of prescribed medication(s) as well as the risks, benefits, and potential side effects. Patient is agreeable to call the office with any worsening of symptoms or onset of side effects. Patient is agreeable to call 911 or go to the nearest ER should he/she begin having SI/HI.    SAFETY PLAN:  Patient was given ample time for questions and fully participated in treatment planning.  Patient was encouraged to call the clinic with any questions or concerns.  Patient was informed of access to emergency care. If patient were to develop any significant symptomatology, suicidal ideation, homicidal ideation, any concerns, or feel unsafe at any time they are to call the clinic and if unable to get immediate assistance should immediately call 911 or go to the nearest emergency room.  The patient is advised to remove or secure (lock away) all lethal weapons (including guns) and sharps (including razors, scissors, knives, etc.).  All medications (including any prescribed and any over the counter medications) should be stored in a safe and secured location that is not obtainable by children/adolescents.  Patient was given an opportunity and encouraged to ask questions about their medication, illness, and treatment. Patient contracted verbally for the following: If you are experiencing an emotional crisis or have thoughts of harming yourself or others, please go to your nearest local emergency room or call 911. Will continue to re-assess medication response and side effects frequently to establish efficacy and ensure safety. Risks, any black box warnings, side effects, off label usage, and benefits of  medication and treatment discussed with patient, along with potential adverse side effects of current and/or newly prescribed medication, alternative treatment options, and OTC medications.  Patient verbalized understanding of potential risks, any off label use of medication, any black box warnings, and any side effects in their own words. The patient verbalized understanding and agreed to comply with the safety plan discussed in their own words.  Patient given the number to the office. Number also available to the 24- hour suicide hotline.     Short Term Goals: Continue building rapport with the patient. Patient will be compliant with medication, and patient will have no significant medication related side effects.  Patient will be engaged in psychotherapy as indicated.  Patient will report subjective improvement of symptoms.     Long term goals: To stabilize mood and treat/improve subjective symptoms, the patient will stay out of the hospital, the patient will be at an optimal level of functioning, and the patient will take all medications as prescribed.     Mikhail reviewed and is appropriate.    Copied text in this note has been reviewed and is accurate as of 11/20/2024.    Part of this note may be an electronic transcription/translation of spoken language to printed text using the Dragon Dictation System.    Khadra Gandhi, DNP, APRN, PMHNP-BC

## 2024-12-04 ENCOUNTER — OFFICE VISIT (OUTPATIENT)
Age: 29
End: 2024-12-04
Payer: OTHER MISCELLANEOUS

## 2024-12-04 VITALS
HEART RATE: 90 BPM | SYSTOLIC BLOOD PRESSURE: 90 MMHG | OXYGEN SATURATION: 98 % | WEIGHT: 126 LBS | HEIGHT: 65 IN | BODY MASS INDEX: 20.99 KG/M2 | DIASTOLIC BLOOD PRESSURE: 60 MMHG

## 2024-12-04 DIAGNOSIS — T43.505A ANTIPSYCHOTIC-INDUCED AKATHISIA: ICD-10-CM

## 2024-12-04 DIAGNOSIS — G25.71 ANTIPSYCHOTIC-INDUCED AKATHISIA: ICD-10-CM

## 2024-12-04 DIAGNOSIS — F42.2 MIXED OBSESSIONAL THOUGHTS AND ACTS: ICD-10-CM

## 2024-12-04 DIAGNOSIS — F31.60 BIPOLAR AFFECTIVE DISORDER, MIXED: Primary | ICD-10-CM

## 2024-12-04 RX ORDER — HYDROXYZINE HYDROCHLORIDE 25 MG/1
50 TABLET, FILM COATED ORAL 3 TIMES DAILY PRN
Qty: 180 TABLET | Refills: 0 | Status: SHIPPED | OUTPATIENT
Start: 2024-12-04 | End: 2025-01-03

## 2024-12-04 NOTE — PROGRESS NOTES
"     Office  Follow Up Visit      Patient Name: Deann Coon  : 1995   MRN: [id number]     Referring Provider: Fawn Peraza APRN    Chief Complaint:  \"My intrusive thoughts are better but I feel anxious and jittery.\"      ICD-10-CM ICD-9-CM   1. Bipolar affective disorder, mixed  F31.60 296.60   2. Mixed obsessional thoughts and acts  F42.2 300.3   3. Antipsychotic-induced akathisia  G25.71 333.99    T43.505A E939.3        History of Present Illness:   Deann Coon is a 29 y.o. female presenting for a routine follow up appointment for psychiatric medication management. She was initially seen 2024 in this clinic for a history of obsessive-compulsive disorder, paranoia, and auditory hallucinations.  She reported the audio hallucinations started about 5 years ago and the paranoia started about a year to a year and a half ago.  She endorsed marijuana and delta 8, delta 9 use 2 weeks prior to her hospitalization at [facility] in 2024.  She was recently discharged from psychiatric inpatient care at [place] on 2024.  Both hospitalizations were result of new onset and worsening paranoia related to fear of poisoning.  The patient became socially withdrawn, stopped eating with significant weight loss due to fear of poisoning, and had a decline in self-care.  Medications upon recent hospital discharge include fluvoxamine 150mg daily, trazodone 25mg every night, ziprasidone 60mg BID, and Saphris 5mg BID as needed for anxiety. The patient reported during the initial evaluation that the paranoia and audio hallucinations had significantly improved with the current medication regimen.  However, she continued to hear noncommanding voices and  has some but limited insight about her paranoia.  She reported that her parents have been supportive in helping her recognize paranoid thoughts. She reported that she is interested in increasing dosages of her medications for " "maximal therapeutic effect, \" to get better\".  We discussed increasing zisprasidone from 60mg BID to 80mg BID today. We discussed reevaluating her symptoms and increasing fluvoxamine at the next appointment if needed.  Safety plan made and scanned in the chart.       The patient reported having an episode with voices, experiencing anxiety, and feelings of hopelessness. The patient had started taking allergy medication, Zyrtec, which was suspected might have been interacting with Luvox. She started taking Zyrtec on October 23rd and experienced worsening symptoms by the following Monday night. She considered overdosing but decided to go to the hospital instead. The patient's medications were switched, causing further anxiety, but she is trying to engage in activities to overcome her fears, such as bleaching her hair and painting her nails. The patient was prescribed Prozac (20 mg) and Vraylar 3 mg daily, which she started taking while hospitalized. She has not taken Prozac before and reports an improvement in mood, but still experiences frequent auditory hallucinations. Prior to hospital admission, the hallucinations were aggressive, but they have since become less intense.  She currently denies SI/HI/VH.  Endorses audio hallucinations.     The patient has a history of bipolar diagnosis and reported instances of impulsive behavior, such as driving to [place]. She denied any family history of bipolar disorder and reported no recent substance use. The patient was not experiencing suicidal thoughts or thoughts of harming others. Vraylar was increased from 3 mg to 4.5 mg daily and during the last appointment 1 week ago. She continued Prozac 20 mg daily.    Today the patient reports that the Vraylar has been effective in improving her OCD and reducing auditory hallucinations. However, she has been experiencing increased anxiety and restlessness, which she describes as being fidgety and pacing. The patient is currently " taking hydroxyzine twice a day for anxiety and is inquiring about the possibility of adjusting the dosing. The patient has made progress in managing her OCD at home, as she has started reintroducing previously unused clothes back into her closet due to a fear of chemicals. She expresses a sense of hopefulness with the improvements she has experienced. Denies SI/HI/AVH.     Subjective      Review of Systems:   Ears, Nose, Mouth, & Throat: Denies difficulty hearing, smelling, sinus problems, sore throat, or dentition.  Heart/Vascular: Denies rapid heart rate, chest pain, swelling of feet or legs   Respiratory: Denies shortness of breath, cough, or wheeze  GI/: Denies nausea, vomiting, constipation, diarrhea, abdominal pain, painful urination, frequent urination  MSK: Denies swelling or joint deformities  Skin: Denies lesions or rash  Neurologic: Denies headaches, dizziness, or tremor   Endocrine: Denies heat or cold intolerance   Hematologic: Denies easy bruising or bleeding  Psychiatric: Endorses anxiety, restlessness, improvement in intrusive thoughts; denies insomnia, irritability, depression,  recurrent bad thoughts, mood swings, hallucinations, compulsions      PHQ-9 Depression Screening  Little interest or pleasure in doing things? Over half   Feeling down, depressed, or hopeless? Several days   PHQ-2 Total Score 3   Trouble falling or staying asleep, or sleeping too much? Several days   Feeling tired or having little energy? Over half   Poor appetite or overeating? Not at all   Feeling bad about yourself - or that you are a failure or have let yourself or your family down? Almost all   Trouble concentrating on things, such as reading the newspaper or watching television? Over half   Moving or speaking so slowly that other people could have noticed? Or the opposite - being so fidgety or restless that you have been moving around a lot more than usual? Almost all   Thoughts that you would be better off dead, or  of hurting yourself in some way? Not at all   PHQ-9 Total Score 14   If you checked off any problems, how difficult have these problems made it for you to do your work, take care of things at home, or get along with other people? Somewhat difficult         SILVIA-7      Over the last two weeks, how often have you been bothered by the following problems?  Feeling nervous, anxious or on edge: Nearly every day  Not being able to stop or control worrying: Several days  Worrying too much about different things: Several days  Trouble Relaxing: Nearly every day  Being so restless that it is hard to sit still: Nearly every day  Becoming easily annoyed or irritable: Several days  Feeling afraid as if something awful might happen: Several days  SILVIA 7 Total Score: 13  If you checked any problems, how difficult have these problems made it for you to do your work, take care of things at home, or get along with other people: Very difficult    Patient History:   The following portions of the patient's history were reviewed and updated as appropriate: allergies, current medications, past family history, past medical history, past social history, past surgical history and problem list.     Social History     Socioeconomic History    Marital status: Single   Tobacco Use    Smoking status: Never    Smokeless tobacco: Never   Vaping Use    Vaping status: Never Used   Substance and Sexual Activity    Alcohol use: Yes     Comment: sometimes    Drug use: Not Currently     Types: Marijuana    Sexual activity: Defer       History reviewed. No pertinent family history.    Medications:     Current Outpatient Medications:     FLUoxetine (PROzac) 20 MG capsule, Take 1 capsule by mouth Daily., Disp: , Rfl:     polyethylene glycol (MIRALAX) 17 GM/SCOOP powder, Take 17 g (1 CAPFUL) by mouth Daily As Needed (constipation)., Disp: 238 g, Rfl: 0    traZODone (DESYREL) 50 MG tablet, Take 1 tablet by mouth Every Night., Disp: 30 tablet, Rfl: 0     "Cariprazine HCl (Vraylar) 3 MG capsule capsule, Take 1 capsule by mouth Daily for 30 days., Disp: 30 capsule, Rfl: 0    hydrOXYzine (ATARAX) 25 MG tablet, Take 2 tablets by mouth 3 (Three) Times a Day As Needed for Itching for up to 30 days., Disp: 180 tablet, Rfl: 0    Objective     Physical Exam:  Vital Signs:   Vitals:    12/04/24 0945   BP: 90/60   Pulse: 90   SpO2: 98%   Weight: 57.2 kg (126 lb)   Height: 165.1 cm (65\")     Body mass index is 20.97 kg/m².     Mental Status Exam:   The patient was alert and oriented to time, place, person, and situation. Neatly groomed and dressed. Good eye contact. Cooperative and answers questions willingly. No evidence of gait imbalance or shuffling. Normal speech rate, rhythm, and tone. Reports euthymic/good mood. Affect congruent with mood. Denies SI/HI/AVH. Organized thought process. No evidence of delusional thinking. Fair insight, fair judgement. Impulse control intact. Memory intact with average to above average intellectual functioning.     No Known Allergies     Current Medications:   Current Outpatient Medications   Medication Sig Dispense Refill    FLUoxetine (PROzac) 20 MG capsule Take 1 capsule by mouth Daily.      polyethylene glycol (MIRALAX) 17 GM/SCOOP powder Take 17 g (1 CAPFUL) by mouth Daily As Needed (constipation). 238 g 0    traZODone (DESYREL) 50 MG tablet Take 1 tablet by mouth Every Night. 30 tablet 0    Cariprazine HCl (Vraylar) 3 MG capsule capsule Take 1 capsule by mouth Daily for 30 days. 30 capsule 0    hydrOXYzine (ATARAX) 25 MG tablet Take 2 tablets by mouth 3 (Three) Times a Day As Needed for Itching for up to 30 days. 180 tablet 0     No current facility-administered medications for this visit.       @RESULASTCBCDIFFPANEL,TSH,LABLIPI,QYOUQGKU10,LLDGOMSG04,MG,FOLATE,PROLACTIN,CRPRESULT,CMP,D3TIHYOSYMF)@    Lab Results   Component Value Date    GLUCOSE 141 (H) 09/24/2024    BUN 12 09/24/2024    CREATININE 0.82 09/24/2024    EGFR 98.8 09/24/2024 " "   BCR 14.6 09/24/2024    K 3.7 09/24/2024    CO2 25.3 09/24/2024    CALCIUM 9.8 09/24/2024    ALBUMIN 4.6 09/18/2024    BILITOT 0.4 09/18/2024    AST 19 09/18/2024    ALT 12 09/18/2024       Lab Results   Component Value Date    WBC 4.85 09/24/2024    HGB 14.8 09/24/2024    HCT 43.6 09/24/2024    MCV 89.7 09/24/2024     09/24/2024       No results found for: \"CHOL\", \"CHLPL\", \"TRIG\", \"HDL\", \"LDL\"               Assessment / Plan      Impression/Treatment Plan:  The patient reports significant improvement in OCD symptoms and a reduction in auditory hallucinations with Vraylar but is experiencing increased anxiety and restlessness since increasing Vraylar dose from 3 mg to 4.5 mg at night. She is currently taking hydroxyzine 25 mg twice daily for anxiety and requests an adjustment in dosing. The plan includes decreasing the Vraylar dose from 4.5 mg to 3 mg daily and increasing hydroxyzine to 50 mg, up to three times daily as needed. Follow-up is scheduled in one week to reassess.    1. Obsessive-Compulsive Disorder (OCD) and Auditory Hallucinations  - The patient reports improvement in OCD symptoms and a reduction in auditory hallucinations with Vraylar.  - Patient mentions making progress at home, reintroducing clothes into her closet that she previously avoided due to fear of chemicals.  - Plan: Continue Vraylar, but decrease the dose from 4.5 mg to 3 mg daily due to restlessness and fidgetiness possibly related to the medication. Reevaluate in one week.    2. Anxiety  - The patient reports an increase in anxiety.  - Currently taking hydroxyzine twice daily but feels she could use it more often.  - Plan: Increase hydroxyzine dose from 25 mg to 50 mg, up to three times daily as needed for anxiety. Reevaluate in one week.    3. Restlessness and Fidgetiness  - The patient reports increased restlessness and fidgetiness.  - These symptoms were not present at the 3 mg dose of Vraylar.  - Plan: Decrease Vraylar dose " from 4.5 mg to 3 mg daily. Monitor for improvement in restlessness and fidgetiness. Reevaluate in one week.    Follow-up:  - Schedule a follow-up appointment in one week to assess the patient's response to the medication adjustments and to address any concerns or questions.        Diagnoses and all orders for this visit:    1. Bipolar affective disorder, mixed (Primary)  -     Cariprazine HCl (Vraylar) 3 MG capsule capsule; Take 1 capsule by mouth Daily for 30 days.  Dispense: 30 capsule; Refill: 0    2. Mixed obsessional thoughts and acts  -     hydrOXYzine (ATARAX) 25 MG tablet; Take 2 tablets by mouth 3 (Three) Times a Day As Needed for Itching for up to 30 days.  Dispense: 180 tablet; Refill: 0    3. Antipsychotic-induced akathisia  -     hydrOXYzine (ATARAX) 25 MG tablet; Take 2 tablets by mouth 3 (Three) Times a Day As Needed for Itching for up to 30 days.  Dispense: 180 tablet; Refill: 0        MEDS ORDERED DURING VISIT:  New Medications Ordered This Visit   Medications    Cariprazine HCl (Vraylar) 3 MG capsule capsule     Sig: Take 1 capsule by mouth Daily for 30 days.     Dispense:  30 capsule     Refill:  0    hydrOXYzine (ATARAX) 25 MG tablet     Sig: Take 2 tablets by mouth 3 (Three) Times a Day As Needed for Itching for up to 30 days.     Dispense:  180 tablet     Refill:  0       Follow up with Khadra Gandhi, KHUSHBU, APRN, PMHNP-BC in this office in 1 week.          PATIENT EDUCATION:  Discussed medication options and treatment plan of prescribed medication(s) as well as the risks, benefits, and potential side effects. Patient is agreeable to call the office with any worsening of symptoms or onset of side effects. Patient is agreeable to call 911 or go to the nearest ER should he/she begin having SI/HI.    SAFETY PLAN:  Patient was given ample time for questions and fully participated in treatment planning.  Patient was encouraged to call the clinic with any questions or concerns.  Patient was  informed of access to emergency care. If patient were to develop any significant symptomatology, suicidal ideation, homicidal ideation, any concerns, or feel unsafe at any time they are to call the clinic and if unable to get immediate assistance should immediately call 911 or go to the nearest emergency room.  The patient is advised to remove or secure (lock away) all lethal weapons (including guns) and sharps (including razors, scissors, knives, etc.).  All medications (including any prescribed and any over the counter medications) should be stored in a safe and secured location that is not obtainable by children/adolescents.  Patient was given an opportunity and encouraged to ask questions about their medication, illness, and treatment. Patient contracted verbally for the following: If you are experiencing an emotional crisis or have thoughts of harming yourself or others, please go to your nearest local emergency room or call 911. Will continue to re-assess medication response and side effects frequently to establish efficacy and ensure safety. Risks, any black box warnings, side effects, off label usage, and benefits of medication and treatment discussed with patient, along with potential adverse side effects of current and/or newly prescribed medication, alternative treatment options, and OTC medications.  Patient verbalized understanding of potential risks, any off label use of medication, any black box warnings, and any side effects in their own words. The patient verbalized understanding and agreed to comply with the safety plan discussed in their own words.  Patient given the number to the office. Number also available to the 24- hour suicide hotline.     Short Term Goals: Continue building rapport with the patient. Patient will be compliant with medication, and patient will have no significant medication related side effects.  Patient will be engaged in psychotherapy as indicated.  Patient will report  subjective improvement of symptoms.     Long term goals: To stabilize mood and treat/improve subjective symptoms, the patient will stay out of the hospital, the patient will be at an optimal level of functioning, and the patient will take all medications as prescribed.     Mikhail reviewed and is appropriate.    Copied text in this note has been reviewed and is accurate as of 12/4/2024.    Part of this note may be an electronic transcription/translation of spoken language to printed text using the Dragon Dictation System.    Khadra Gandhi, KHUSHBU, APRN, PMHNP-BC

## 2024-12-06 ENCOUNTER — TELEPHONE (OUTPATIENT)
Age: 29
End: 2024-12-06
Payer: OTHER MISCELLANEOUS

## 2024-12-06 NOTE — TELEPHONE ENCOUNTER
Left a vm on patient's mothers phone to return call to reschedule the no show appt she had on 12/5/24.

## 2024-12-11 ENCOUNTER — OFFICE VISIT (OUTPATIENT)
Age: 29
End: 2024-12-11
Payer: OTHER MISCELLANEOUS

## 2024-12-11 VITALS
OXYGEN SATURATION: 98 % | SYSTOLIC BLOOD PRESSURE: 92 MMHG | HEART RATE: 86 BPM | WEIGHT: 123 LBS | BODY MASS INDEX: 20.49 KG/M2 | DIASTOLIC BLOOD PRESSURE: 63 MMHG | HEIGHT: 65 IN

## 2024-12-11 DIAGNOSIS — G25.71 ANTIPSYCHOTIC-INDUCED AKATHISIA: ICD-10-CM

## 2024-12-11 DIAGNOSIS — F42.2 MIXED OBSESSIONAL THOUGHTS AND ACTS: ICD-10-CM

## 2024-12-11 DIAGNOSIS — F31.60 BIPOLAR AFFECTIVE DISORDER, MIXED: Primary | ICD-10-CM

## 2024-12-11 DIAGNOSIS — G47.00 INSOMNIA, UNSPECIFIED TYPE: ICD-10-CM

## 2024-12-11 DIAGNOSIS — T43.505A ANTIPSYCHOTIC-INDUCED AKATHISIA: ICD-10-CM

## 2024-12-11 RX ORDER — LAMOTRIGINE 25 MG/1
25 TABLET ORAL DAILY
Qty: 30 TABLET | Refills: 1 | Status: SHIPPED | OUTPATIENT
Start: 2024-12-11 | End: 2025-02-09

## 2024-12-11 NOTE — PROGRESS NOTES
"     Office  Follow Up Visit      Patient Name: Deann Coon  : 1995   MRN: [id number]     Referring Provider: Fawn Peraza APRN    Chief Complaint:  \"I am still hearing a voice although it is less frequent and less aggressive.\"    ICD-10-CM ICD-9-CM   1. Bipolar affective disorder, mixed  F31.60 296.60   2. Mixed obsessional thoughts and acts  F42.2 300.3   3. Antipsychotic-induced akathisia  G25.71 333.99    T43.505A E939.3   4. Insomnia, unspecified type  G47.00 780.52        History of Present Illness:   Deann Coon is a 29 y.o. female presenting for a routine follow up appointment for psychiatric medication management. She was initially seen in this clinic for management of symptoms secondary to bipolar disorder and OCD. The patient reports a positive difference in akathisia since the reduction of Vraylar, with less aggressive pacing and no extreme inner restlessness. She confirms that hydroxyzine, taken three times a day, has been helpful in managing her anxiety. The patient describes her mood as more stable and manageable, with no thoughts of self-harm or harm to others. She continues to experience auditory hallucinations, but they are less frequent and less aggressive, making them more manageable. The patient reports hearing a male voice in their head, which is no longer threatening but rather comments on day-to-day activities. She expresses that this is the best experience she has had on medications and feels hopeful about her treatment. The patient is currently taking Vraylar, lamotrigine, hydroxyzine, and trazodone.    Subjective      Review of Systems:   Ears, Nose, Mouth, & Throat: Denies difficulty hearing, smelling, sinus problems, sore throat, or dentition.  Heart/Vascular: Denies rapid heart rate, chest pain, swelling of feet or legs   Respiratory: Denies shortness of breath, cough, or wheeze  GI/: Denies nausea, vomiting, constipation, diarrhea, abdominal pain, painful " urination, frequent urination  MSK: Denies swelling or joint deformities  Skin: Denies lesions or rash  Neurologic: Denies headaches, dizziness, or tremor   Endocrine: Denies heat or cold intolerance   Hematologic: Denies easy bruising or bleeding  Psychiatric: endorses audio hallucinations; denies insomnia, irritability, depression, anxiety, recurrent bad thoughts, mood swings, compulsions    PHQ-9 Depression Screening  Little interest or pleasure in doing things? Several days   Feeling down, depressed, or hopeless? Several days   PHQ-2 Total Score 2   Trouble falling or staying asleep, or sleeping too much? Several days   Feeling tired or having little energy? Over half   Poor appetite or overeating? Almost all   Feeling bad about yourself - or that you are a failure or have let yourself or your family down? Several days   Trouble concentrating on things, such as reading the newspaper or watching television? Over half   Moving or speaking so slowly that other people could have noticed? Or the opposite - being so fidgety or restless that you have been moving around a lot more than usual? Over half   Thoughts that you would be better off dead, or of hurting yourself in some way? Not at all   PHQ-9 Total Score 13   If you checked off any problems, how difficult have these problems made it for you to do your work, take care of things at home, or get along with other people? Somewhat difficult         SILVIA-7      Over the last two weeks, how often have you been bothered by the following problems?  Feeling nervous, anxious or on edge: More than half the days  Not being able to stop or control worrying: Several days  Worrying too much about different things: Several days  Being so restless that it is hard to sit still: More than half the days  Becoming easily annoyed or irritable: Not at all  Feeling afraid as if something awful might happen: Several days  SILVIA 7 Total Score: 7  If you checked any problems, how difficult  "have these problems made it for you to do your work, take care of things at home, or get along with other people: Somewhat difficult    Patient History:   The following portions of the patient's history were reviewed and updated as appropriate: allergies, current medications, past family history, past medical history, past social history, past surgical history and problem list.     Social History     Socioeconomic History    Marital status: Single   Tobacco Use    Smoking status: Never    Smokeless tobacco: Never   Vaping Use    Vaping status: Never Used   Substance and Sexual Activity    Alcohol use: Yes     Comment: sometimes    Drug use: Not Currently     Types: Marijuana    Sexual activity: Defer       History reviewed. No pertinent family history.    Medications:     Current Outpatient Medications:     Cariprazine HCl (Vraylar) 3 MG capsule capsule, Take 1 capsule by mouth Daily for 30 days., Disp: 30 capsule, Rfl: 0    FLUoxetine (PROzac) 20 MG capsule, Take 1 capsule by mouth Daily., Disp: , Rfl:     hydrOXYzine (ATARAX) 25 MG tablet, Take 2 tablets by mouth 3 (Three) Times a Day As Needed for Itching for up to 30 days., Disp: 180 tablet, Rfl: 0    polyethylene glycol (MIRALAX) 17 GM/SCOOP powder, Take 17 g (1 CAPFUL) by mouth Daily As Needed (constipation)., Disp: 238 g, Rfl: 0    traZODone (DESYREL) 50 MG tablet, Take 1 tablet by mouth Every Night., Disp: 30 tablet, Rfl: 0    lamoTRIgine (LaMICtal) 25 MG tablet, Take 1 tablet by mouth Daily for 60 days., Disp: 30 tablet, Rfl: 1    Objective     Physical Exam:  Vital Signs:   Vitals:    12/11/24 0946   BP: 92/63   Pulse: 86   SpO2: 98%   Weight: 55.8 kg (123 lb)   Height: 165.1 cm (65\")     Body mass index is 20.47 kg/m².     Mental Status Exam:   The patient was alert and oriented to time, place, person, and situation. Neatly groomed and dressed. Good eye contact. Cooperative and answers questions willingly. No evidence of gait imbalance or shuffling. " "Normal speech rate, rhythm, and tone. Reports euthymic mood. Affect congruent with mood. Endorses audio hallucinations; denies SI/HI/VH. Organized thought process. No evidence of delusional thinking. Fair insight, fair judgement. Impulse control intact. Memory intact with average to above average intellectual functioning.     No Known Allergies       Current Medications:   Current Outpatient Medications   Medication Sig Dispense Refill    Cariprazine HCl (Vraylar) 3 MG capsule capsule Take 1 capsule by mouth Daily for 30 days. 30 capsule 0    FLUoxetine (PROzac) 20 MG capsule Take 1 capsule by mouth Daily.      hydrOXYzine (ATARAX) 25 MG tablet Take 2 tablets by mouth 3 (Three) Times a Day As Needed for Itching for up to 30 days. 180 tablet 0    polyethylene glycol (MIRALAX) 17 GM/SCOOP powder Take 17 g (1 CAPFUL) by mouth Daily As Needed (constipation). 238 g 0    traZODone (DESYREL) 50 MG tablet Take 1 tablet by mouth Every Night. 30 tablet 0    lamoTRIgine (LaMICtal) 25 MG tablet Take 1 tablet by mouth Daily for 60 days. 30 tablet 1     No current facility-administered medications for this visit.       @RESULASTCBCDIFFPANEL,TSH,LABLIPI,[id number],ZBVOFGLZ15,MG,FOLATE,PROLACTIN,CRPRESULT,CMP,[id number])@    Lab Results   Component Value Date    GLUCOSE 141 (H) 09/24/2024    BUN 12 09/24/2024    CREATININE 0.82 09/24/2024    EGFR 98.8 09/24/2024    BCR 14.6 09/24/2024    K 3.7 09/24/2024    CO2 25.3 09/24/2024    CALCIUM 9.8 09/24/2024    ALBUMIN 4.6 09/18/2024    BILITOT 0.4 09/18/2024    AST 19 09/18/2024    ALT 12 09/18/2024       Lab Results   Component Value Date    WBC 4.85 09/24/2024    HGB 14.8 09/24/2024    HCT 43.6 09/24/2024    MCV 89.7 09/24/2024     09/24/2024       No results found for: \"CHOL\", \"CHLPL\", \"TRIG\", \"HDL\", \"LDL\"               Assessment / Plan      Impression/Treatment Plan:  Deann C Jaymie is a 29 y.o. female presenting for a routine follow up appointment for psychiatric " "medication management. She was initially seen October 16, 2024 in this clinic for management of obsessive-compulsive disorder, paranoia, and auditory hallucinations.  She reported the audio hallucinations started about 5 years ago and the paranoia started about a year to a year and a half ago.  She endorsed marijuana and delta 8, delta 9 use 2 weeks prior to her hospitalization at [facility] in August 2024.  She was recently discharged from psychiatric inpatient care at [facility] on October 1, 2024.  Both hospitalizations were result of new onset and worsening paranoia related to fear of poisoning.  The patient became socially withdrawn, stopped eating with significant weight loss due to fear of poisoning, and had a decline in self-care.  Medications upon recent hospital discharge include fluvoxamine 150mg daily, trazodone 25mg every night, ziprasidone 60mg BID, and Saphris 5mg BID as needed for anxiety. The patient reported during the initial evaluation that the paranoia and audio hallucinations had significantly improved with the current medication regimen.  However, she continued to hear noncommanding voices and  has some but limited insight about her paranoia.  She reported that her parents have been supportive in helping her recognize paranoid thoughts. She reported that she is interested in increasing dosages of her medications for maximal therapeutic effect, \" to get better\".  We discussed increasing zisprasidone from 60mg BID to 80mg BID today. We discussed reevaluating her symptoms and increasing fluvoxamine at the next appointment if needed.  Safety plan made and scanned in the chart.       The patient reported having an episode with voices, experiencing anxiety, and feelings of hopelessness. The patient had started taking allergy medication, Zyrtec, which was suspected might have been interacting with Luvox. She started taking Zyrtec on October 23rd and experienced worsening symptoms by the " following Monday night. She considered overdosing but decided to go to the hospital instead. The patient's medications were switched, causing further anxiety, but she is trying to engage in activities to overcome her fears, such as bleaching her hair and painting her nails. The patient was prescribed Prozac (20 mg) and Vraylar 3 mg daily, which she started taking while hospitalized. She has not taken Prozac before and reports an improvement in mood, but still experiences frequent auditory hallucinations. Prior to hospital admission, the hallucinations were aggressive, but they have since become less intense.  She currently denies SI/HI/VH.  Endorses audio hallucinations.     The patient has a history of bipolar diagnosis and reported instances of impulsive behavior, such as driving to [place]. She denied any family history of bipolar disorder and reported no recent substance use. The patient was not experiencing suicidal thoughts or thoughts of harming others. Current Vraylar dose is 3 mg at night.     Today the patient reports significant improvement since the reduction of Vraylar from 4.5 mg to 3 mg at night, with decreased aggressive pacing and inner restlessness. Anxiety symptoms have improved with hydroxyzine, and mood is more stable and manageable. Auditory hallucinations are less frequent and less aggressive. The patient is currently taking Vraylar, fluoxetine, hydroxyzine, and trazodone. The plan includes maintaining the current Vraylar dosage, continuing hydroxyzine, decreasing fluoxetine, initiating lamotrigine, and continuing trazodone. Follow-up is scheduled in two weeks.    1. Bipolar Disorder:     - Patient reports a decrease in the intensity and frequency of auditory hallucinations since the reduction of Vraylar. Continue to monitor progress.     - Patient notes hallucinations are less aggressive and more manageable, now mostly commenting on day-to-day activities rather than being threatening.      "- Plan: Maintain current Vraylar dosage at 3 mg at night and reassess in two weeks.    2. Anxiety/OCD/Antipsychotic induced akathisia:     - Patient reports improvement in anxiety symptoms and intrusive thoughts with hydroxyzine 50 mg taken three times a day.     - Patient notes a reduction in inner restlessness and feeling of wanting to \"jump out of skin.\"     - Plan: Continue hydroxyzine 50 mg three times per day as needed for anxiety and reassess in two weeks.    3. Mood symptoms:     - Patient reports feeling more stable and manageable mood symptoms.     - Patient notes being less fixated on things and feeling good overall.     - Plan: Decrease Prozac to 10 milligrams daily for two weeks. Plan to discontinue due to continuation of auditory hallucinations and bipolar diagnosis.      - Plan: Initiate lamotrigine therapy at 25 milligrams daily. Monitor for side effects, particularly rash. Increase lamotrigine dosage to 50 milligrams after two weeks if tolerated. Lamotrigine education and risk for SJS reiterated including importance of daily adherence and instruction to call office with missed dosage greater than 5 days, with potential need for dose reduction and retitration or transition to alternative therapy. Discussed importance of assessing skin for signs of rash and discontinuing/ calling office should rash appear.  Pt v/u of this and agrees to call office with questions or concerns.      4. Insomnia:     - Patient confirms continued use of trazodone.     - Plan: Continue trazodone 50 mg at night and reassess in two weeks.    5. Patient education:     - Informed patient about lamotrigine's potential side effects, including drowsiness and rash. Advised patient to take lamotrigine at night and to contact the provider if any rash or allergic reaction occurs.     - Educated patient on the need to restart lamotrigine at 25 mg if doses are missed for 5 days or longer.    6. Follow-up:     - Schedule a follow-up " appointment in two weeks to reassess symptoms, medication tolerability, and adjust treatment plan as needed.    Diagnoses and all orders for this visit:    1. Bipolar affective disorder, mixed (Primary)  -     lamoTRIgine (LaMICtal) 25 MG tablet; Take 1 tablet by mouth Daily for 60 days.  Dispense: 30 tablet; Refill: 1    2. Mixed obsessional thoughts and acts    3. Antipsychotic-induced akathisia    4. Insomnia, unspecified type        MEDS ORDERED DURING VISIT:  New Medications Ordered This Visit   Medications    lamoTRIgine (LaMICtal) 25 MG tablet     Sig: Take 1 tablet by mouth Daily for 60 days.     Dispense:  30 tablet     Refill:  1       Follow up with Khadra Gandhi, KHUSHBU, APRN, PMHNP-BC in this office in 2 weeks.          PATIENT EDUCATION:  Discussed medication options and treatment plan of prescribed medication(s) as well as the risks, benefits, and potential side effects. Patient is agreeable to call the office with any worsening of symptoms or onset of side effects. Patient is agreeable to call 911 or go to the nearest ER should he/she begin having SI/HI.    SAFETY PLAN:  Patient was given ample time for questions and fully participated in treatment planning.  Patient was encouraged to call the clinic with any questions or concerns.  Patient was informed of access to emergency care. If patient were to develop any significant symptomatology, suicidal ideation, homicidal ideation, any concerns, or feel unsafe at any time they are to call the clinic and if unable to get immediate assistance should immediately call 911 or go to the nearest emergency room.  The patient is advised to remove or secure (lock away) all lethal weapons (including guns) and sharps (including razors, scissors, knives, etc.).  All medications (including any prescribed and any over the counter medications) should be stored in a safe and secured location that is not obtainable by children/adolescents.  Patient was given an  opportunity and encouraged to ask questions about their medication, illness, and treatment. Patient contracted verbally for the following: If you are experiencing an emotional crisis or have thoughts of harming yourself or others, please go to your nearest local emergency room or call 911. Will continue to re-assess medication response and side effects frequently to establish efficacy and ensure safety. Risks, any black box warnings, side effects, off label usage, and benefits of medication and treatment discussed with patient, along with potential adverse side effects of current and/or newly prescribed medication, alternative treatment options, and OTC medications.  Patient verbalized understanding of potential risks, any off label use of medication, any black box warnings, and any side effects in their own words. The patient verbalized understanding and agreed to comply with the safety plan discussed in their own words.  Patient given the number to the office. Number also available to the 24- hour suicide hotline.     Short Term Goals: Continue building rapport with the patient. Patient will be compliant with medication, and patient will have no significant medication related side effects.  Patient will be engaged in psychotherapy as indicated.  Patient will report subjective improvement of symptoms.     Long term goals: To stabilize mood and treat/improve subjective symptoms, the patient will stay out of the hospital, the patient will be at an optimal level of functioning, and the patient will take all medications as prescribed.     Mikhail reviewed and is appropriate.    Copied text in this note has been reviewed and is accurate as of 12/11/2024.    Part of this note may be an electronic transcription/translation of spoken language to printed text using the Dragon Dictation System.    Khadra Gandhi, DNP, APRN, PMHNP-BC

## 2024-12-27 ENCOUNTER — OFFICE VISIT (OUTPATIENT)
Age: 29
End: 2024-12-27
Payer: COMMERCIAL

## 2024-12-27 VITALS
SYSTOLIC BLOOD PRESSURE: 106 MMHG | HEART RATE: 64 BPM | BODY MASS INDEX: 20.49 KG/M2 | OXYGEN SATURATION: 96 % | HEIGHT: 65 IN | DIASTOLIC BLOOD PRESSURE: 70 MMHG | WEIGHT: 123 LBS

## 2024-12-27 DIAGNOSIS — F31.60 BIPOLAR AFFECTIVE DISORDER, MIXED: Primary | ICD-10-CM

## 2024-12-27 DIAGNOSIS — F42.2 MIXED OBSESSIONAL THOUGHTS AND ACTS: ICD-10-CM

## 2024-12-27 DIAGNOSIS — T43.505A ANTIPSYCHOTIC-INDUCED AKATHISIA: ICD-10-CM

## 2024-12-27 DIAGNOSIS — G25.71 ANTIPSYCHOTIC-INDUCED AKATHISIA: ICD-10-CM

## 2024-12-27 DIAGNOSIS — G47.00 INSOMNIA, UNSPECIFIED TYPE: ICD-10-CM

## 2024-12-27 RX ORDER — LAMOTRIGINE 100 MG/1
100 TABLET ORAL DAILY
Qty: 30 TABLET | Refills: 2 | Status: SHIPPED | OUTPATIENT
Start: 2024-12-27 | End: 2025-12-27

## 2024-12-27 RX ORDER — MULTIVIT-MIN/IRON FUM/FOLIC AC 7.5 MG-4
1 TABLET ORAL DAILY
Qty: 120 TABLET | Refills: 2 | Status: SHIPPED | OUTPATIENT
Start: 2024-12-27 | End: 2025-12-27

## 2024-12-27 NOTE — PROGRESS NOTES
"     Office  Follow Up Visit      Patient Name: Deann Coon  : 1995   MRN: [id number]     Referring Provider: Fawn Peraza APRN    Chief Complaint:  \"follow up for management of bipolar disorder and OCD\"      ICD-10-CM ICD-9-CM   1. Bipolar affective disorder, mixed  F31.60 296.60   2. Mixed obsessional thoughts and acts  F42.2 300.3   3. Antipsychotic-induced akathisia  G25.71 333.99    T43.505A E939.3   4. Insomnia, unspecified type  G47.00 780.52        History of Present Illness:   Deann Coon is a 29 y.o. female presenting for a routine follow up appointment for psychiatric medication management. She was initially seen in this clinic for management of symptoms secondary to bipolar disorder and OCD. The patient reports no significant changes in symptoms since starting the current medication regimen and has discontinued Prozac without any noticeable changes in auditory hallucinations. She continues to experience manageable restlessness and finds hydroxyzine helpful for anxiety. The patient is currently on Vraylar 3 mg daily, lamotrigine 25 mg at night (with plans to increase the dose), trazodone 50 mg at night, and a multivitamin. Denies SI/HI/VH.    Subjective      Review of Systems:   Ears, Nose, Mouth, & Throat: Denies difficulty hearing, smelling, sinus problems, sore throat, or dentition.  Heart/Vascular: Denies rapid heart rate, chest pain, swelling of feet or legs   Respiratory: Denies shortness of breath, cough, or wheeze  GI/: Denies nausea, vomiting, constipation, diarrhea, abdominal pain, painful urination, frequent urination  MSK: Denies swelling or joint deformities  Skin: Denies lesions or rash  Neurologic: Denies headaches, dizziness, or tremor   Endocrine: Denies heat or cold intolerance   Hematologic: Denies easy bruising or bleeding  Psychiatric: Denies insomnia, irritability, depression, anxiety, recurrent bad thoughts, mood swings, hallucinations, " compulsions    PHQ-9 Depression Screening  Little interest or pleasure in doing things? Several days   Feeling down, depressed, or hopeless? Several days   PHQ-2 Total Score 2   Trouble falling or staying asleep, or sleeping too much? Several days   Feeling tired or having little energy? Several days   Poor appetite or overeating? Over half   Feeling bad about yourself - or that you are a failure or have let yourself or your family down? Over half   Trouble concentrating on things, such as reading the newspaper or watching television? Over half   Moving or speaking so slowly that other people could have noticed? Or the opposite - being so fidgety or restless that you have been moving around a lot more than usual? Over half   Thoughts that you would be better off dead, or of hurting yourself in some way? Not at all   PHQ-9 Total Score 12   If you checked off any problems, how difficult have these problems made it for you to do your work, take care of things at home, or get along with other people? Somewhat difficult         SILVIA-7      Over the last two weeks, how often have you been bothered by the following problems?  Feeling nervous, anxious or on edge: More than half the days  Not being able to stop or control worrying: Several days  Worrying too much about different things: Several days  Trouble Relaxing: More than half the days  Being so restless that it is hard to sit still: More than half the days  Becoming easily annoyed or irritable: Not at all  Feeling afraid as if something awful might happen: Several days  SILVIA 7 Total Score: 9  If you checked any problems, how difficult have these problems made it for you to do your work, take care of things at home, or get along with other people: Somewhat difficult    Patient History:   The following portions of the patient's history were reviewed and updated as appropriate: allergies, current medications, past family history, past medical history, past social history,  "past surgical history and problem list.     Social History     Socioeconomic History    Marital status: Single   Tobacco Use    Smoking status: Never    Smokeless tobacco: Never   Vaping Use    Vaping status: Never Used   Substance and Sexual Activity    Alcohol use: Yes     Comment: sometimes    Drug use: Not Currently     Types: Marijuana    Sexual activity: Defer       History reviewed. No pertinent family history.    Medications:     Current Outpatient Medications:     Cariprazine HCl (Vraylar) 3 MG capsule capsule, Take 1 capsule by mouth Daily for 30 days., Disp: 30 capsule, Rfl: 0    FLUoxetine (PROzac) 20 MG capsule, Take 1 capsule by mouth Daily., Disp: , Rfl:     hydrOXYzine (ATARAX) 25 MG tablet, Take 2 tablets by mouth 3 (Three) Times a Day As Needed for Itching for up to 30 days., Disp: 180 tablet, Rfl: 0    polyethylene glycol (MIRALAX) 17 GM/SCOOP powder, Take 17 g (1 CAPFUL) by mouth Daily As Needed (constipation)., Disp: 238 g, Rfl: 0    traZODone (DESYREL) 50 MG tablet, Take 1 tablet by mouth Every Night., Disp: 30 tablet, Rfl: 0    lamoTRIgine (LaMICtal) 100 MG tablet, Take 1 tablet by mouth Daily. Take 1/2 tablet (50 mg) at night for two weeks, then increase to 1 tablet (100 mg) at night. Call office if new rash appears or go to the nearest ED in case of an emergency., Disp: 30 tablet, Rfl: 2    multivitamin with minerals tablet tablet, Take 1 tablet by mouth Daily., Disp: 120 tablet, Rfl: 2    Objective     Physical Exam:  Vital Signs:   Vitals:    12/27/24 0805   BP: 106/70   Pulse: 64   SpO2: 96%   Weight: 55.8 kg (123 lb)   Height: 165.1 cm (65\")     Body mass index is 20.47 kg/m².     Mental Status Exam:   The patient was alert and oriented to time, place, person, and situation. Neatly groomed and dressed. Good eye contact. Cooperative and answers questions willingly. No evidence of gait imbalance or shuffling. Normal speech rate, rhythm, and tone. Reports good mood. Affect congruent with " "mood. Denies SI/HI/VH. Endorses AH. Organized thought process. No evidence of delusional thinking. Fair insight, fair judgement. Impulse control intact. Memory intact with average to above average intellectual functioning.     No Known Allergies     No LMP recorded.   Birth Control:    Current Medications:   Current Outpatient Medications   Medication Sig Dispense Refill    Cariprazine HCl (Vraylar) 3 MG capsule capsule Take 1 capsule by mouth Daily for 30 days. 30 capsule 0    FLUoxetine (PROzac) 20 MG capsule Take 1 capsule by mouth Daily.      hydrOXYzine (ATARAX) 25 MG tablet Take 2 tablets by mouth 3 (Three) Times a Day As Needed for Itching for up to 30 days. 180 tablet 0    polyethylene glycol (MIRALAX) 17 GM/SCOOP powder Take 17 g (1 CAPFUL) by mouth Daily As Needed (constipation). 238 g 0    traZODone (DESYREL) 50 MG tablet Take 1 tablet by mouth Every Night. 30 tablet 0    lamoTRIgine (LaMICtal) 100 MG tablet Take 1 tablet by mouth Daily. Take 1/2 tablet (50 mg) at night for two weeks, then increase to 1 tablet (100 mg) at night. Call office if new rash appears or go to the nearest ED in case of an emergency. 30 tablet 2    multivitamin with minerals tablet tablet Take 1 tablet by mouth Daily. 120 tablet 2     No current facility-administered medications for this visit.       @RESULASTCBCDIFFPANEL,TSH,LABLIPI,VPIODODR54,OSROXWYJ41,MG,FOLATE,PROLACTIN,CRPRESULT,CMP,A6BIAEBCFVS)@    Lab Results   Component Value Date    GLUCOSE 141 (H) 09/24/2024    BUN 12 09/24/2024    CREATININE 0.82 09/24/2024    EGFR 98.8 09/24/2024    BCR 14.6 09/24/2024    K 3.7 09/24/2024    CO2 25.3 09/24/2024    CALCIUM 9.8 09/24/2024    ALBUMIN 4.6 09/18/2024    BILITOT 0.4 09/18/2024    AST 19 09/18/2024    ALT 12 09/18/2024       Lab Results   Component Value Date    WBC 4.85 09/24/2024    HGB 14.8 09/24/2024    HCT 43.6 09/24/2024    MCV 89.7 09/24/2024     09/24/2024       No results found for: \"CHOL\", \"CHLPL\", \"TRIG\", " "\"HDL\", \"LDL\"               Assessment / Plan      Impression/Treatment Plan:  Deann Coon is a 29 y.o. female presenting for a routine follow up appointment for psychiatric medication management. She was initially seen October 16, 2024 in this clinic for management of obsessive-compulsive disorder, paranoia, and auditory hallucinations.  She reported the audio hallucinations started about 5 years ago and the paranoia started about a year to a year and a half ago.  She endorsed marijuana and delta 8, delta 9 use 2 weeks prior to her hospitalization at [facility] in August 2024.  She was recently discharged from psychiatric inpatient care at [facility] on October 1, 2024.  Both hospitalizations were result of new onset and worsening paranoia related to fear of poisoning.  The patient became socially withdrawn, stopped eating with significant weight loss due to fear of poisoning, and had a decline in self-care.  Medications upon recent hospital discharge include fluvoxamine 150mg daily, trazodone 25mg every night, ziprasidone 60mg BID, and Saphris 5mg BID as needed for anxiety. The patient reported during the initial evaluation that the paranoia and audio hallucinations had significantly improved with the current medication regimen.  However, she continued to hear noncommanding voices and  has some but limited insight about her paranoia.  She reported that her parents have been supportive in helping her recognize paranoid thoughts. She reported that she is interested in increasing dosages of her medications for maximal therapeutic effect, \" to get better\".  We discussed increasing zisprasidone from 60mg BID to 80mg BID today. We discussed reevaluating her symptoms and increasing fluvoxamine at the next appointment if needed.  Safety plan made and scanned in the chart.       The patient reported having an episode with voices, experiencing anxiety, and feelings of hopelessness. The patient had started " taking allergy medication, Zyrtec, which was suspected might have been interacting with Luvox. She started taking Zyrtec on October 23rd and experienced worsening symptoms by the following Monday night. She considered overdosing but decided to go to the hospital instead. The patient's medications were switched, causing further anxiety, but she is trying to engage in activities to overcome her fears, such as bleaching her hair and painting her nails. The patient was prescribed Prozac (20 mg) and Vraylar 3 mg daily, which she started taking while hospitalized. She has not taken Prozac before and reports an improvement in mood, but still experiences frequent auditory hallucinations. Prior to hospital admission, the hallucinations were aggressive, but they have since become less intense.      Today the patient reports no significant changes in symptoms since starting the current medication regimen. She has not experienced any rash. The patient discontinued Prozac. She reports no noticeable changes in auditory hallucinations since discontinuing Prozac, stating the voices remain reduced but stable. The patient continues to experience some restlessness, though it is now more manageable compared to previous anxiety and pacing. Sleep patterns and response to trazodone are unremarkable, with the patient unable to discern any significant changes. The patient reports feeling hopeful about the current medication regimen. She finds hydroxyzine helpful for anxiety management. Denies SI/HI/VH. Follow-up is scheduled in 2 weeks to assess the response to the increased lamotrigine dosage.    1. Depression/anxiety related to Bipolar Disorder, akathisia from antipsychotic/Insomnia:     - Patient is off Prozac and currently taking trazodone at night and hydroxyzine as needed for anxiety.     - Continue trazodone 50 mg at night and hydroxyzine 25-50 mg three time per day as needed for anxiety, monitor for any changes in symptoms.     -  Increase lamotrigine to 50 mg daily.     - Patient reports hydroxyzine has been helpful.    2. Hallucinations:     - Patient is on Vraylar 3 mg daily and reports a decrease in aggressive voices and manageable restlessness.     - Continue Vraylar 3 mg daily, consider alternative medications if restlessness becomes too bothersome.     - Patient prefers to continue with current medication despite some restlessness.    3. Multivitamin:     - Patient requests a refill on her multivitamin.     - Prescribe the multivitamin as previously given.     - Encourage the patient to follow up with her primary care physician for physical health management.    4. Refills and follow-up:     - Refill Vraylar 3 mg daily, lamotrigine 50 mg at night (with a plan to increase to 100 mg), and multivitamin.     - Schedule a follow-up appointment in 2 weeks to assess the patient's response to the increased lamotrigine dosage.     - Instruct the patient to call if she develops a rash and to seek emergency care if the rash is severe and the office is unavailable.    Overall, the patient reports feeling good and is hopeful about her current medication regimen. Continue to monitor her progress and make adjustments as needed to optimize her mental health management.      Diagnoses and all orders for this visit:    1. Bipolar affective disorder, mixed (Primary)  -     lamoTRIgine (LaMICtal) 100 MG tablet; Take 1 tablet by mouth Daily. Take 1/2 tablet (50 mg) at night for two weeks, then increase to 1 tablet (100 mg) at night. Call office if new rash appears or go to the nearest ED in case of an emergency.  Dispense: 30 tablet; Refill: 2  -     Cariprazine HCl (Vraylar) 3 MG capsule capsule; Take 1 capsule by mouth Daily for 30 days.  Dispense: 30 capsule; Refill: 0    2. Mixed obsessional thoughts and acts  -     lamoTRIgine (LaMICtal) 100 MG tablet; Take 1 tablet by mouth Daily. Take 1/2 tablet (50 mg) at night for two weeks, then increase to 1  tablet (100 mg) at night. Call office if new rash appears or go to the nearest ED in case of an emergency.  Dispense: 30 tablet; Refill: 2    3. Antipsychotic-induced akathisia    4. Insomnia, unspecified type    Other orders  -     multivitamin with minerals tablet tablet; Take 1 tablet by mouth Daily.  Dispense: 120 tablet; Refill: 2        MEDS ORDERED DURING VISIT:  New Medications Ordered This Visit   Medications    lamoTRIgine (LaMICtal) 100 MG tablet     Sig: Take 1 tablet by mouth Daily. Take 1/2 tablet (50 mg) at night for two weeks, then increase to 1 tablet (100 mg) at night. Call office if new rash appears or go to the nearest ED in case of an emergency.     Dispense:  30 tablet     Refill:  2    Cariprazine HCl (Vraylar) 3 MG capsule capsule     Sig: Take 1 capsule by mouth Daily for 30 days.     Dispense:  30 capsule     Refill:  0    multivitamin with minerals tablet tablet     Sig: Take 1 tablet by mouth Daily.     Dispense:  120 tablet     Refill:  2       Follow up with Khadra Gandhi, DNP, APRN, PMHNP-BC in this office in 2 weeks.          PATIENT EDUCATION:  Discussed medication options and treatment plan of prescribed medication(s) as well as the risks, benefits, and potential side effects. Patient is agreeable to call the office with any worsening of symptoms or onset of side effects. Patient is agreeable to call 911 or go to the nearest ER should he/she begin having SI/HI.    SAFETY PLAN:  Patient was given ample time for questions and fully participated in treatment planning.  Patient was encouraged to call the clinic with any questions or concerns.  Patient was informed of access to emergency care. If patient were to develop any significant symptomatology, suicidal ideation, homicidal ideation, any concerns, or feel unsafe at any time they are to call the clinic and if unable to get immediate assistance should immediately call 911 or go to the nearest emergency room.  The patient is  advised to remove or secure (lock away) all lethal weapons (including guns) and sharps (including razors, scissors, knives, etc.).  All medications (including any prescribed and any over the counter medications) should be stored in a safe and secured location that is not obtainable by children/adolescents.  Patient was given an opportunity and encouraged to ask questions about their medication, illness, and treatment. Patient contracted verbally for the following: If you are experiencing an emotional crisis or have thoughts of harming yourself or others, please go to your nearest local emergency room or call 911. Will continue to re-assess medication response and side effects frequently to establish efficacy and ensure safety. Risks, any black box warnings, side effects, off label usage, and benefits of medication and treatment discussed with patient, along with potential adverse side effects of current and/or newly prescribed medication, alternative treatment options, and OTC medications.  Patient verbalized understanding of potential risks, any off label use of medication, any black box warnings, and any side effects in their own words. The patient verbalized understanding and agreed to comply with the safety plan discussed in their own words.  Patient given the number to the office. Number also available to the 24- hour suicide hotline.     Short Term Goals: Continue building rapport with the patient. Patient will be compliant with medication, and patient will have no significant medication related side effects.  Patient will be engaged in psychotherapy as indicated.  Patient will report subjective improvement of symptoms.     Long term goals: To stabilize mood and treat/improve subjective symptoms, the patient will stay out of the hospital, the patient will be at an optimal level of functioning, and the patient will take all medications as prescribed.     Mikhail reviewed and is appropriate.    Copied text in  this note has been reviewed and is accurate as of 12/27/2024.    Part of this note may be an electronic transcription/translation of spoken language to printed text using the Dragon Dictation System.    Khadra Gandhi, KHUSHBU, APRN, PMHNP-BC

## 2025-01-08 ENCOUNTER — OFFICE VISIT (OUTPATIENT)
Age: 30
End: 2025-01-08
Payer: COMMERCIAL

## 2025-01-08 VITALS
SYSTOLIC BLOOD PRESSURE: 102 MMHG | HEART RATE: 71 BPM | BODY MASS INDEX: 20.49 KG/M2 | DIASTOLIC BLOOD PRESSURE: 71 MMHG | HEIGHT: 65 IN | OXYGEN SATURATION: 96 % | WEIGHT: 123 LBS

## 2025-01-08 DIAGNOSIS — F31.60 BIPOLAR AFFECTIVE DISORDER, MIXED: Primary | ICD-10-CM

## 2025-01-08 DIAGNOSIS — G25.71 ANTIPSYCHOTIC-INDUCED AKATHISIA: ICD-10-CM

## 2025-01-08 DIAGNOSIS — T43.505A ANTIPSYCHOTIC-INDUCED AKATHISIA: ICD-10-CM

## 2025-01-08 DIAGNOSIS — F42.2 MIXED OBSESSIONAL THOUGHTS AND ACTS: ICD-10-CM

## 2025-01-08 DIAGNOSIS — G47.00 INSOMNIA, UNSPECIFIED TYPE: ICD-10-CM

## 2025-01-08 RX ORDER — TRAZODONE HYDROCHLORIDE 50 MG/1
50 TABLET, FILM COATED ORAL NIGHTLY
Qty: 30 TABLET | Refills: 0 | Status: SHIPPED | OUTPATIENT
Start: 2025-01-08

## 2025-01-08 RX ORDER — HYDROXYZINE HYDROCHLORIDE 25 MG/1
50 TABLET, FILM COATED ORAL 3 TIMES DAILY PRN
Qty: 30 TABLET | Refills: 2 | Status: SHIPPED | OUTPATIENT
Start: 2025-01-08

## 2025-01-08 NOTE — PROGRESS NOTES
"     Office  Follow Up Visit      Patient Name: Deann Coon  : 1995   MRN: [id number]     Referring Provider: Fawn Peraza APRN    Chief Complaint:  \"management of bipolar disorder, insomnia, OCD, and antispyschotic induced akathisia\"      ICD-10-CM ICD-9-CM   1. Bipolar affective disorder, mixed  F31.60 296.60   2. Insomnia, unspecified type  G47.00 780.52   3. Mixed obsessional thoughts and acts  F42.2 300.3   4. Antipsychotic-induced akathisia  G25.71 333.99    T43.505A E939.3        History of Present Illness:   Deann Coon is a 29 y.o. female presenting for a routine follow up appointment for psychiatric medication management. The patient reports an improvement in symptoms since increasing lamotrigine to 50 mg but still experiences restless energy. She expresses nervousness about switching medications. The patient notes a reduction in akathisia symptoms after decreasing Vraylar from 4.5 mg to 3 mg, although she continues to pace frequently. Auditory hallucinations have decreased, and the patient is less fixated on things. She is considering attending [facility] for support and expresses anxiety about tapering off Vraylar. The patient's mood is stable, and she has not experienced any rash from lamotrigine. She reports taking medications in the evening but has difficulty sleeping and requires a trazodone refill. She has been off Prozac for a month, which has led to a significant decrease in auditory hallucinations, and she feels more like herself.    The patient is taking hydroxyzine and reports not needing the full dose every day, resulting in some remaining medication. She has been taking two tablets, three times a day, primarily for anxiety and occasionally at night for sleep. The patient has been busy with family in the past week and has tried not to take the full dose during that time.    She expresses feeling more like herself since discontinuing Prozac and has days without " hearing voices at all. The patient mentions feeling torn between continuing her current medication regimen and potentially switching to something else due to the restless energy. She appreciates the option to try different medications if needed and feels reassured by the availability of alternatives. Currently denies SI/HI/VH.    Subjective      Review of Systems:   Ears, Nose, Mouth, & Throat: Denies difficulty hearing, smelling, sinus problems, sore throat, or dentition.  Heart/Vascular: Denies rapid heart rate, chest pain, swelling of feet or legs   Respiratory: Denies shortness of breath, cough, or wheeze  GI/: Denies nausea, vomiting, constipation, diarrhea, abdominal pain, painful urination, frequent urination  MSK: Denies swelling or joint deformities  Skin: Denies lesions or rash  Neurologic: Denies headaches, dizziness, or tremor   Endocrine: Denies heat or cold intolerance   Hematologic: Denies easy bruising or bleeding  Psychiatric: Endorses insomnia, anxiety, inner restlessness, and reduced audio hallucinations; denies irritability, depression, recurrent bad thoughts, mood swings, visual hallucinations, compulsions    PHQ-9 Depression Screening  Little interest or pleasure in doing things? Over half   Feeling down, depressed, or hopeless? Several days   PHQ-2 Total Score 3   Trouble falling or staying asleep, or sleeping too much? Almost all   Feeling tired or having little energy? Over half   Poor appetite or overeating? Several days   Feeling bad about yourself - or that you are a failure or have let yourself or your family down? Several days   Trouble concentrating on things, such as reading the newspaper or watching television? Over half   Moving or speaking so slowly that other people could have noticed? Or the opposite - being so fidgety or restless that you have been moving around a lot more than usual? Almost all   Thoughts that you would be better off dead, or of hurting yourself in some way?  Not at all   PHQ-9 Total Score 15   If you checked off any problems, how difficult have these problems made it for you to do your work, take care of things at home, or get along with other people? Very difficult         SILVIA-7      Over the last two weeks, how often have you been bothered by the following problems?  Feeling nervous, anxious or on edge: More than half the days  Not being able to stop or control worrying: Several days  Worrying too much about different things: Several days  Trouble Relaxing: Nearly every day  Being so restless that it is hard to sit still: Nearly every day  Becoming easily annoyed or irritable: Not at all  Feeling afraid as if something awful might happen: Several days  SILVIA 7 Total Score: 11  If you checked any problems, how difficult have these problems made it for you to do your work, take care of things at home, or get along with other people: Very difficult    Patient History:   The following portions of the patient's history were reviewed and updated as appropriate: allergies, current medications, past family history, past medical history, past social history, past surgical history and problem list.     Social History     Socioeconomic History    Marital status: Single   Tobacco Use    Smoking status: Never    Smokeless tobacco: Never   Vaping Use    Vaping status: Never Used   Substance and Sexual Activity    Alcohol use: Yes     Comment: sometimes    Drug use: Not Currently     Types: Marijuana    Sexual activity: Defer       History reviewed. No pertinent family history.    Medications:     Current Outpatient Medications:     traZODone (DESYREL) 50 MG tablet, Take 1 tablet by mouth Every Night., Disp: 30 tablet, Rfl: 0    Cariprazine HCl (Vraylar) 1.5 MG capsule capsule, Take 1 capsule by mouth Daily for 21 days., Disp: 21 capsule, Rfl: 0    FLUoxetine (PROzac) 20 MG capsule, Take 1 capsule by mouth Daily., Disp: , Rfl:     hydrOXYzine (ATARAX) 25 MG tablet, Take 2 tablets by  "mouth 3 (Three) Times a Day As Needed for Itching. Take 1-2 tablets by mouth 3 times per day as needed for anxiety., Disp: 30 tablet, Rfl: 2    lamoTRIgine (LaMICtal) 100 MG tablet, Take 1 tablet by mouth Daily. Take 1/2 tablet (50 mg) at night for two weeks, then increase to 1 tablet (100 mg) at night. Call office if new rash appears or go to the nearest ED in case of an emergency., Disp: 30 tablet, Rfl: 2    multivitamin with minerals tablet tablet, Take 1 tablet by mouth Daily., Disp: 120 tablet, Rfl: 2    polyethylene glycol (MIRALAX) 17 GM/SCOOP powder, Take 17 g (1 CAPFUL) by mouth Daily As Needed (constipation)., Disp: 238 g, Rfl: 0    Objective     Physical Exam:  Vital Signs:   Vitals:    01/08/25 0955   BP: 102/71   Pulse: 71   SpO2: 96%   Weight: 55.8 kg (123 lb)   Height: 165.1 cm (65\")     Body mass index is 20.47 kg/m².     Mental Status Exam:   The patient was alert and oriented to time, place, person, and situation. Neatly groomed and dressed. Good eye contact. Cooperative and answers questions willingly. No evidence of gait imbalance or shuffling. Normal speech rate, rhythm, and tone. Reports anxious mood. Affect congruent with mood. Denies SI/HI/VH. Endorses audio hallucinations. Organized thought process. No evidence of delusional thinking. Fair insight, fair judgement. Impulse control intact. Memory intact with average to above average intellectual functioning.     No Known Allergies     Current Medications:   Current Outpatient Medications   Medication Sig Dispense Refill    traZODone (DESYREL) 50 MG tablet Take 1 tablet by mouth Every Night. 30 tablet 0    Cariprazine HCl (Vraylar) 1.5 MG capsule capsule Take 1 capsule by mouth Daily for 21 days. 21 capsule 0    FLUoxetine (PROzac) 20 MG capsule Take 1 capsule by mouth Daily.      hydrOXYzine (ATARAX) 25 MG tablet Take 2 tablets by mouth 3 (Three) Times a Day As Needed for Itching. Take 1-2 tablets by mouth 3 times per day as needed for " "anxiety. 30 tablet 2    lamoTRIgine (LaMICtal) 100 MG tablet Take 1 tablet by mouth Daily. Take 1/2 tablet (50 mg) at night for two weeks, then increase to 1 tablet (100 mg) at night. Call office if new rash appears or go to the nearest ED in case of an emergency. 30 tablet 2    multivitamin with minerals tablet tablet Take 1 tablet by mouth Daily. 120 tablet 2    polyethylene glycol (MIRALAX) 17 GM/SCOOP powder Take 17 g (1 CAPFUL) by mouth Daily As Needed (constipation). 238 g 0     No current facility-administered medications for this visit.       @RESULASTCBCDIFFPANEL,TSH,LABLIPI,MSRERELV88,QWYDCWMY86,MG,FOLATE,PROLACTIN,CRPRESULT,CMP,[id number])@    Lab Results   Component Value Date    GLUCOSE 141 (H) 09/24/2024    BUN 12 09/24/2024    CREATININE 0.82 09/24/2024    EGFR 98.8 09/24/2024    BCR 14.6 09/24/2024    K 3.7 09/24/2024    CO2 25.3 09/24/2024    CALCIUM 9.8 09/24/2024    ALBUMIN 4.6 09/18/2024    BILITOT 0.4 09/18/2024    AST 19 09/18/2024    ALT 12 09/18/2024       Lab Results   Component Value Date    WBC 4.85 09/24/2024    HGB 14.8 09/24/2024    HCT 43.6 09/24/2024    MCV 89.7 09/24/2024     09/24/2024       No results found for: \"CHOL\", \"CHLPL\", \"TRIG\", \"HDL\", \"LDL\"               Assessment / Plan      Impression/Treatment Plan:  Deann Coon is a 29 y.o. female presenting for a routine follow up appointment for psychiatric medication management. She was initially seen October 16, 2024 in this clinic for management of obsessive-compulsive disorder, paranoia, and auditory hallucinations.  She reported the audio hallucinations started about 5 years ago and the paranoia started about a year to a year and a half ago.  She endorsed marijuana and delta 8, delta 9 use 2 weeks prior to her hospitalization at [facility] in August 2024.  She was recently discharged from psychiatric inpatient care at [facility] on October 1, 2024.  Both hospitalizations were result of new onset and " "worsening paranoia related to fear of poisoning.  The patient became socially withdrawn, stopped eating with significant weight loss due to fear of poisoning, and had a decline in self-care.  Medications upon recent hospital discharge include fluvoxamine 150mg daily, trazodone 25mg every night, ziprasidone 60mg BID, and Saphris 5mg BID as needed for anxiety. The patient reported during the initial evaluation that the paranoia and audio hallucinations had significantly improved with the current medication regimen.  However, she continued to hear noncommanding voices and  has some but limited insight about her paranoia.  She reported that her parents have been supportive in helping her recognize paranoid thoughts. She reported that she is interested in increasing dosages of her medications for maximal therapeutic effect, \" to get better\".  We discussed increasing zisprasidone from 60mg BID to 80mg BID today. We discussed reevaluating her symptoms and increasing fluvoxamine at the next appointment if needed.  Safety plan made and scanned in the chart.       The patient reported having an episode with voices, experiencing anxiety, and feelings of hopelessness. The patient had started taking allergy medication, Zyrtec, which was suspected might have been interacting with Luvox. She started taking Zyrtec on October 23rd and experienced worsening symptoms by the following Monday night. She considered overdosing but decided to go to the hospital instead. The patient's medications were switched, causing further anxiety, but she is trying to engage in activities to overcome her fears, such as bleaching her hair and painting her nails. The patient was prescribed Prozac (20 mg) and Vraylar 3 mg daily, which she started taking while hospitalized. She has not taken Prozac before and reports an improvement in mood, but still experiences frequent auditory hallucinations. Prior to hospital admission, the hallucinations were " aggressive, but they have since become less intense.       Today the patient reports improvement in symptoms since increasing lamotrigine to 50 mg but still experiences restless energy and difficulty sleeping. She is currently taking Vraylar 3 mg daily, which has lessened restlessness but not eliminated it. Auditory hallucinations have decreased significantly since discontinuing Prozac. The patient is considering attending [facility] for support and expresses anxiety about medication changes. The plan includes increasing lamotrigine to 100 mg, reducing Vraylar to 1.5 mg, and refilling trazodone and hydroxyzine prescriptions. Follow-up is scheduled for one week.    1. Bipolar disorder:     - Lamotrigine increased to 50 mg, patient reports feeling better but is still experiencing restless energy.     - Mood is stable, no rash from lamotrigine.     - Plan: Increase lamotrigine to 100 mg daily and monitor for side effects and efficacy. Follow up in one week.    2. Audio hallucinations:     - Voices have decreased since discontinuing Prozac and starting lamotrigine.     - Patient reports having days without hearing voices at all.     - Plan: Continue monitoring symptoms and the effectiveness of lamotrigine. Follow up in one week.    3. Akathisia:     - Restlessness improved since reducing Vraylar from 4.5 mg to 3 mg, but patient still paces a lot.     - Patient describes feeling like her body needs to move.     - Plan: Reduce Vraylar to 1.5 mg and monitor for symptom management. Provided samples to try. If symptoms persist, consider alternative medications. Follow up in one week.    4. Anxiety:     - Patient taking hydroxyzine as needed, not requiring the full dose every day.     - Recently needed less due to family visit and busy schedule.     - Plan: Refill hydroxyzine 25-50 mg three times a day as needed for anxiety. Continue using as needed for anxiety and sleep. Follow up in one week.    5. Insomnia:     - Patient  taking 50 mg trazodone at night but ran out of medication recently and is not sleeping well.     - Plan: Refill trazodone 50 mg at night prescription. Monitor sleep quality and adjust dosage if necessary. Follow up in one week.    6. Support and medication management:     - Patient considering [facility] for support.     - Plan: Encouraged patient to explore support options and remind her to call this office or go to the nearest ED if she is struggling or feeling unstable. Follow up in one week.      Diagnoses and all orders for this visit:    1. Bipolar affective disorder, mixed (Primary)  -     Cariprazine HCl (Vraylar) 1.5 MG capsule capsule; Take 1 capsule by mouth Daily for 21 days.  Dispense: 21 capsule; Refill: 0  -     hydrOXYzine (ATARAX) 25 MG tablet; Take 2 tablets by mouth 3 (Three) Times a Day As Needed for Itching. Take 1-2 tablets by mouth 3 times per day as needed for anxiety.  Dispense: 30 tablet; Refill: 2    2. Insomnia, unspecified type  -     traZODone (DESYREL) 50 MG tablet; Take 1 tablet by mouth Every Night.  Dispense: 30 tablet; Refill: 0  -     hydrOXYzine (ATARAX) 25 MG tablet; Take 2 tablets by mouth 3 (Three) Times a Day As Needed for Itching. Take 1-2 tablets by mouth 3 times per day as needed for anxiety.  Dispense: 30 tablet; Refill: 2    3. Mixed obsessional thoughts and acts  -     hydrOXYzine (ATARAX) 25 MG tablet; Take 2 tablets by mouth 3 (Three) Times a Day As Needed for Itching. Take 1-2 tablets by mouth 3 times per day as needed for anxiety.  Dispense: 30 tablet; Refill: 2    4. Antipsychotic-induced akathisia  -     hydrOXYzine (ATARAX) 25 MG tablet; Take 2 tablets by mouth 3 (Three) Times a Day As Needed for Itching. Take 1-2 tablets by mouth 3 times per day as needed for anxiety.  Dispense: 30 tablet; Refill: 2        MEDS ORDERED DURING VISIT:  New Medications Ordered This Visit   Medications    Cariprazine HCl (Vraylar) 1.5 MG capsule capsule     Sig: Take 1 capsule by  mouth Daily for 21 days.     Dispense:  21 capsule     Refill:  0     Lot #[id number]    traZODone (DESYREL) 50 MG tablet     Sig: Take 1 tablet by mouth Every Night.     Dispense:  30 tablet     Refill:  0    hydrOXYzine (ATARAX) 25 MG tablet     Sig: Take 2 tablets by mouth 3 (Three) Times a Day As Needed for Itching. Take 1-2 tablets by mouth 3 times per day as needed for anxiety.     Dispense:  30 tablet     Refill:  2       Follow up with Khadra Gandhi, DNP, APRN, PMHNP-BC in this office in 1 week.          PATIENT EDUCATION:  Discussed medication options and treatment plan of prescribed medication(s) as well as the risks, benefits, and potential side effects. Patient is agreeable to call the office with any worsening of symptoms or onset of side effects. Patient is agreeable to call 911 or go to the nearest ER should he/she begin having SI/HI.    SAFETY PLAN:  Patient was given ample time for questions and fully participated in treatment planning.  Patient was encouraged to call the clinic with any questions or concerns.  Patient was informed of access to emergency care. If patient were to develop any significant symptomatology, suicidal ideation, homicidal ideation, any concerns, or feel unsafe at any time they are to call the clinic and if unable to get immediate assistance should immediately call 911 or go to the nearest emergency room.  The patient is advised to remove or secure (lock away) all lethal weapons (including guns) and sharps (including razors, scissors, knives, etc.).  All medications (including any prescribed and any over the counter medications) should be stored in a safe and secured location that is not obtainable by children/adolescents.  Patient was given an opportunity and encouraged to ask questions about their medication, illness, and treatment. Patient contracted verbally for the following: If you are experiencing an emotional crisis or have thoughts of harming yourself or  others, please go to your nearest local emergency room or call 911. Will continue to re-assess medication response and side effects frequently to establish efficacy and ensure safety. Risks, any black box warnings, side effects, off label usage, and benefits of medication and treatment discussed with patient, along with potential adverse side effects of current and/or newly prescribed medication, alternative treatment options, and OTC medications.  Patient verbalized understanding of potential risks, any off label use of medication, any black box warnings, and any side effects in their own words. The patient verbalized understanding and agreed to comply with the safety plan discussed in their own words.  Patient given the number to the office. Number also available to the 24- hour suicide hotline.     Short Term Goals: Continue building rapport with the patient. Patient will be compliant with medication, and patient will have no significant medication related side effects.  Patient will be engaged in psychotherapy as indicated.  Patient will report subjective improvement of symptoms.     Long term goals: To stabilize mood and treat/improve subjective symptoms, the patient will stay out of the hospital, the patient will be at an optimal level of functioning, and the patient will take all medications as prescribed.     Mikhail reviewed and is appropriate.    Copied text in this note has been reviewed and is accurate as of 1/8/2025.    Part of this note may be an electronic transcription/translation of spoken language to printed text using the Dragon Dictation System.    Khadra Gandhi, DNP, APRN, PMHNP-BC

## 2025-01-13 ENCOUNTER — TELEPHONE (OUTPATIENT)
Age: 30
End: 2025-01-13
Payer: OTHER MISCELLANEOUS

## 2025-01-13 NOTE — TELEPHONE ENCOUNTER
Patient called in stated that the medication Lamotrigine has given her an rash since the medication has been increased and she has stopped taking it.    Self

## 2025-01-13 NOTE — TELEPHONE ENCOUNTER
Spoke with the patient on the phone regarding new rash that has developed under her arms. Instructed to stop lamotrigine and take hydroxyzine 25 mg up to 2-3 times per day for the itching related to the rash. The patient will follow up in the office on 1/15/24. Instructed to go to the ED if symptoms worsen, in case of an emergency. The patient verbalizes understanding.

## 2025-01-15 ENCOUNTER — OFFICE VISIT (OUTPATIENT)
Age: 30
End: 2025-01-15
Payer: COMMERCIAL

## 2025-01-15 VITALS
HEART RATE: 78 BPM | HEIGHT: 65 IN | OXYGEN SATURATION: 98 % | SYSTOLIC BLOOD PRESSURE: 101 MMHG | DIASTOLIC BLOOD PRESSURE: 69 MMHG | WEIGHT: 123 LBS | BODY MASS INDEX: 20.49 KG/M2

## 2025-01-15 DIAGNOSIS — F42.2 MIXED OBSESSIONAL THOUGHTS AND ACTS: ICD-10-CM

## 2025-01-15 DIAGNOSIS — G47.00 INSOMNIA, UNSPECIFIED TYPE: ICD-10-CM

## 2025-01-15 DIAGNOSIS — F31.60 BIPOLAR AFFECTIVE DISORDER, MIXED: Primary | ICD-10-CM

## 2025-01-15 RX ORDER — LUMATEPERONE 42 MG/1
1 CAPSULE ORAL
Qty: 14 CAPSULE | Refills: 0 | COMMUNITY
Start: 2025-01-15 | End: 2025-01-29

## 2025-01-15 NOTE — PROGRESS NOTES
"     Office  Follow Up Visit      Patient Name: Deann Coon  : 1995   MRN: [id number]     Referring Provider: Fawn Peraza APRN    Chief Complaint:  \"management of bipolar disorder, OCD, and insomnia\"      ICD-10-CM ICD-9-CM   1. Bipolar affective disorder, mixed  F31.60 296.60   2. Insomnia, unspecified type  G47.00 780.52   3. Mixed obsessional thoughts and acts  F42.2 300.3        History of Present Illness:   Deann Coon is a 29 y.o. female presenting for a routine follow up appointment for psychiatric medication management. She was initially seen in this clinic for management of OCD, bipolar disorder, and insomnia. The patient reports that her rash has cleared up a little bit and she has not noticed any blisters in her mouth or anywhere on her body. She confirms that she has stopped taking lamotrigine and is currently on Vraylar, trazodone, and hydroxyzine. The patient has not noticed any significant changes in her mood, still experiencing restless energy and pacing after Vraylar dose decrease, but no extra anxiety. She mentions that she continues to experience AH, but there are no commanding voices. The patient has previously been on lithium but did not notice a significant difference in her mood, as she was not on it for an extended period. She currently describes her mood as euthymic, at her baseline.    Subjective      Review of Systems:   Ears, Nose, Mouth, & Throat: Denies difficulty hearing, smelling, sinus problems, sore throat, or dentition.  Heart/Vascular: Denies rapid heart rate, chest pain, swelling of feet or legs   Respiratory: Denies shortness of breath, cough, or wheeze  GI/: Denies nausea, vomiting, constipation, diarrhea, abdominal pain, painful urination, frequent urination  MSK: Denies swelling or joint deformities  Skin: Denies lesions or rash  Neurologic: Denies headaches, dizziness, or tremor   Endocrine: Denies heat or cold intolerance   Hematologic: Denies " easy bruising or bleeding  Psychiatric: Endorses restlessness, pacing, AH; denies insomnia, irritability, depression, anxiety, recurrent bad thoughts, mood swings, visual hallucinations, compulsions    PHQ-9 Depression Screening  Little interest or pleasure in doing things?     Feeling down, depressed, or hopeless?     PHQ-2 Total Score     Trouble falling or staying asleep, or sleeping too much?     Feeling tired or having little energy?     Poor appetite or overeating?     Feeling bad about yourself - or that you are a failure or have let yourself or your family down?     Trouble concentrating on things, such as reading the newspaper or watching television?     Moving or speaking so slowly that other people could have noticed? Or the opposite - being so fidgety or restless that you have been moving around a lot more than usual?     Thoughts that you would be better off dead, or of hurting yourself in some way?     PHQ-9 Total Score     If you checked off any problems, how difficult have these problems made it for you to do your work, take care of things at home, or get along with other people?           SILVIA-7           Patient History:   The following portions of the patient's history were reviewed and updated as appropriate: allergies, current medications, past family history, past medical history, past social history, past surgical history and problem list.     Social History     Socioeconomic History    Marital status: Single   Tobacco Use    Smoking status: Never    Smokeless tobacco: Never   Vaping Use    Vaping status: Never Used   Substance and Sexual Activity    Alcohol use: Yes     Comment: sometimes    Drug use: Not Currently     Types: Marijuana    Sexual activity: Defer       No family history on file.    Medications:     Current Outpatient Medications:     hydrOXYzine (ATARAX) 25 MG tablet, Take 2 tablets by mouth 3 (Three) Times a Day As Needed for Itching. Take 1-2 tablets by mouth 3 times per day  "as needed for anxiety., Disp: 30 tablet, Rfl: 2    multivitamin with minerals tablet tablet, Take 1 tablet by mouth Daily., Disp: 120 tablet, Rfl: 2    polyethylene glycol (MIRALAX) 17 GM/SCOOP powder, Take 17 g (1 CAPFUL) by mouth Daily As Needed (constipation)., Disp: 238 g, Rfl: 0    traZODone (DESYREL) 50 MG tablet, Take 1 tablet by mouth Every Night., Disp: 30 tablet, Rfl: 0    lamoTRIgine (LaMICtal) 100 MG tablet, Take 1 tablet by mouth Daily. Take 1/2 tablet (50 mg) at night for two weeks, then increase to 1 tablet (100 mg) at night. Call office if new rash appears or go to the nearest ED in case of an emergency. (Patient not taking: Reported on 1/15/2025), Disp: 30 tablet, Rfl: 2    Lumateperone Tosylate (Caplyta) 42 MG capsule, Take 1 capsule by mouth every night at bedtime for 14 days. Lot NDC 79528-789-58, Disp: 14 capsule, Rfl: 0    Objective     Physical Exam:  Vital Signs:   Vitals:    01/15/25 0757   BP: 101/69   Pulse: 78   SpO2: 98%   Weight: 55.8 kg (123 lb)   Height: 165.1 cm (65\")     Body mass index is 20.47 kg/m².     Mental Status Exam:   The patient was alert and oriented to time, place, person, and situation. Neatly groomed and dressed. Good eye contact. Cooperative and answers questions willingly. No evidence of gait imbalance or shuffling. Normal speech rate, rhythm, and tone. Reports euthymic mood. Affect congruent with mood. Endorses Ah; denies SI/HI/VH. Organized thought process. No evidence of delusional thinking. Fair insight, fair judgement. Impulse control intact. Memory intact with average to above average intellectual functioning.     No Known Allergies     Current Medications:   Current Outpatient Medications   Medication Sig Dispense Refill    hydrOXYzine (ATARAX) 25 MG tablet Take 2 tablets by mouth 3 (Three) Times a Day As Needed for Itching. Take 1-2 tablets by mouth 3 times per day as needed for anxiety. 30 tablet 2    multivitamin with minerals tablet tablet Take 1 tablet " "by mouth Daily. 120 tablet 2    polyethylene glycol (MIRALAX) 17 GM/SCOOP powder Take 17 g (1 CAPFUL) by mouth Daily As Needed (constipation). 238 g 0    traZODone (DESYREL) 50 MG tablet Take 1 tablet by mouth Every Night. 30 tablet 0    lamoTRIgine (LaMICtal) 100 MG tablet Take 1 tablet by mouth Daily. Take 1/2 tablet (50 mg) at night for two weeks, then increase to 1 tablet (100 mg) at night. Call office if new rash appears or go to the nearest ED in case of an emergency. (Patient not taking: Reported on 1/15/2025) 30 tablet 2    Lumateperone Tosylate (Caplyta) 42 MG capsule Take 1 capsule by mouth every night at bedtime for 14 days. Lot NDC 02136-081-16 14 capsule 0     No current facility-administered medications for this visit.       @RESULASTCBCDIFFPANEL,TSH,LABLIPI,OCRKVWJG10,EKAQJPFD78,MG,FOLATE,PROLACTIN,CRPRESULT,CMP,[id number])@    Lab Results   Component Value Date    GLUCOSE 141 (H) 09/24/2024    BUN 12 09/24/2024    CREATININE 0.82 09/24/2024    EGFR 98.8 09/24/2024    BCR 14.6 09/24/2024    K 3.7 09/24/2024    CO2 25.3 09/24/2024    CALCIUM 9.8 09/24/2024    ALBUMIN 4.6 09/18/2024    BILITOT 0.4 09/18/2024    AST 19 09/18/2024    ALT 12 09/18/2024       Lab Results   Component Value Date    WBC 4.85 09/24/2024    HGB 14.8 09/24/2024    HCT 43.6 09/24/2024    MCV 89.7 09/24/2024     09/24/2024       No results found for: \"CHOL\", \"CHLPL\", \"TRIG\", \"HDL\", \"LDL\"               Assessment / Plan      Impression/Treatment Plan:  Deann Coon is a 29 y.o. female presenting for a routine follow up appointment for psychiatric medication management. She was initially seen October 16, 2024 in this clinic for management of obsessive-compulsive disorder, paranoia, and auditory hallucinations.  She reported the audio hallucinations started about 5 years ago and the paranoia started about a year to a year and a half ago.  She endorsed marijuana and delta 8, delta 9 use 2 weeks prior to her " "hospitalization at [facility] in August 2024.  She was recently discharged from psychiatric inpatient care at [facility] on October 1, 2024.  Both hospitalizations were result of new onset and worsening paranoia related to fear of poisoning.  The patient became socially withdrawn, stopped eating with significant weight loss due to fear of poisoning, and had a decline in self-care.  Medications upon recent hospital discharge include fluvoxamine 150mg daily, trazodone 25mg every night, ziprasidone 60mg BID, and Saphris 5mg BID as needed for anxiety. The patient reported during the initial evaluation that the paranoia and audio hallucinations had significantly improved with the current medication regimen.  However, she continued to hear noncommanding voices and  has some but limited insight about her paranoia.  She reported that her parents have been supportive in helping her recognize paranoid thoughts. She reported that she is interested in increasing dosages of her medications for maximal therapeutic effect, \" to get better\".  We discussed increasing zisprasidone from 60mg BID to 80mg BID today. We discussed reevaluating her symptoms and increasing fluvoxamine at the next appointment if needed.  Safety plan made and scanned in the chart.       The patient reported having an episode with voices, experiencing anxiety, and feelings of hopelessness. The patient had started taking allergy medication, Zyrtec, which was suspected might have been interacting with Luvox. She started taking Zyrtec on October 23rd and experienced worsening symptoms by the following Monday night. She considered overdosing but decided to go to the hospital instead. The patient's medications were switched, causing further anxiety, but she is trying to engage in activities to overcome her fears, such as bleaching her hair and painting her nails. The patient was prescribed Prozac (20 mg) and Vraylar 3 mg daily, which she started taking " while hospitalized. She has not taken Prozac before and reports an improvement in mood, but still experiences frequent auditory hallucinations. Prior to hospital admission, the hallucinations were aggressive, but they have since become less intense.       Today the patient reports improvement in her rash and no blisters in her mouth after discontinuing lamotrigine and starting hydroxyzine. She has not noticed significant changes in mood or anxiety levels and continues to experience energy and pacing without worsening symptoms. The patient acknowledges auditory hallucinations without commanding voices, no change since last appointment. She is open to trying Caplyta instead of Vraylar and understands the potential side effects and benefits. Discussed potential trial of lithium in future if needed for mood stabilization.    1. Rash improvement  - Patient reports that the rash has improved since discontinuing lamotrigine and taking hydroxyzine three times per day. No blisters or mouth involvement noted. Continue hydroxyzine as prescribed.    2. Discontinuation of lamotrigine  - Patient confirms stopping lamotrigine. No further action needed.    3. Mood stability, Akathisia  - Patient reports no significant changes in mood and no increase in anxiety. Energy levels/restlessness remain the same with some pacing. No thoughts of self-harm or harm to others. Auditory hallucinations present but no commanding voices.   - Plan: Start Caplyta 42 mg daily and discontinue Vraylar due to akathisia. Educated on potential side effects and benefits. Common side effects associated with this medication include dry mouth, constipation, indigestion, weight gain, and dizziness. Use caution when going from a sitting to standing position due to potential dizziness. There is a potential for weight gain while using this medication with an increased risk for diabetes and high cholesterol. Additionally, there is a risk of developing involuntary  movements of the head, neck, and body that can be irreversible. Severe side effects include hyperglycemia requiring emergent care, a rare but serious skin condition (DRESS syndrome) causing a rash, itching, fatigue, and fever. Rarely, a condition called neuroleptic malignant syndrome (NMS) can occur with symptoms of fever, muscle rigidity, changes in mental status, blood pressure and kidney function. Call the office with any questions or concerns or go to the ED Emergency department in case of an emergency.     -Schedule a follow-up appointment in one week to assess the patient's response to Caplyta.    4. Potential future lithium trial  - Discussed the possibility of lithium trial if mood destabilizes. Patient has a history of lithium use but was treated in a short time span and did not notice a significant difference. Reevaluate in one week.        Diagnoses and all orders for this visit:    1. Bipolar affective disorder, mixed (Primary)    2. Insomnia, unspecified type    3. Mixed obsessional thoughts and acts    Other orders  -     Lumateperone Tosylate (Caplyta) 42 MG capsule; Take 1 capsule by mouth every night at bedtime for 14 days. Lot NDC [id number]  Dispense: 14 capsule; Refill: 0        MEDS ORDERED DURING VISIT:  New Medications Ordered This Visit   Medications    Lumateperone Tosylate (Caplyta) 42 MG capsule     Sig: Take 1 capsule by mouth every night at bedtime for 14 days. Lot NDC [id number]     Dispense:  14 capsule     Refill:  0       Follow up with Khadra Gandhi, DNP, APRN, PMHNP-BC in this office in 1 week.          PATIENT EDUCATION:  Discussed medication options and treatment plan of prescribed medication(s) as well as the risks, benefits, and potential side effects. Patient is agreeable to call the office with any worsening of symptoms or onset of side effects. Patient is agreeable to call 911 or go to the nearest ER should he/she begin having SI/HI.    SAFETY PLAN:  Patient was  given ample time for questions and fully participated in treatment planning.  Patient was encouraged to call the clinic with any questions or concerns.  Patient was informed of access to emergency care. If patient were to develop any significant symptomatology, suicidal ideation, homicidal ideation, any concerns, or feel unsafe at any time they are to call the clinic and if unable to get immediate assistance should immediately call 911 or go to the nearest emergency room.  The patient is advised to remove or secure (lock away) all lethal weapons (including guns) and sharps (including razors, scissors, knives, etc.).  All medications (including any prescribed and any over the counter medications) should be stored in a safe and secured location that is not obtainable by children/adolescents.  Patient was given an opportunity and encouraged to ask questions about their medication, illness, and treatment. Patient contracted verbally for the following: If you are experiencing an emotional crisis or have thoughts of harming yourself or others, please go to your nearest local emergency room or call 911. Will continue to re-assess medication response and side effects frequently to establish efficacy and ensure safety. Risks, any black box warnings, side effects, off label usage, and benefits of medication and treatment discussed with patient, along with potential adverse side effects of current and/or newly prescribed medication, alternative treatment options, and OTC medications.  Patient verbalized understanding of potential risks, any off label use of medication, any black box warnings, and any side effects in their own words. The patient verbalized understanding and agreed to comply with the safety plan discussed in their own words.  Patient given the number to the office. Number also available to the 24- hour suicide hotline.     Short Term Goals: Continue building rapport with the patient. Patient will be compliant  with medication, and patient will have no significant medication related side effects.  Patient will be engaged in psychotherapy as indicated.  Patient will report subjective improvement of symptoms.     Long term goals: To stabilize mood and treat/improve subjective symptoms, the patient will stay out of the hospital, the patient will be at an optimal level of functioning, and the patient will take all medications as prescribed.     Mikhail reviewed and is appropriate.    Copied text in this note has been reviewed and is accurate as of 1/15/2025.    Part of this note may be an electronic transcription/translation of spoken language to printed text using the Dragon Dictation System.    Khadra Gandhi, DNP, APRN, PMHNP-BC

## 2025-01-22 ENCOUNTER — OFFICE VISIT (OUTPATIENT)
Age: 30
End: 2025-01-22
Payer: COMMERCIAL

## 2025-01-22 VITALS
DIASTOLIC BLOOD PRESSURE: 69 MMHG | HEART RATE: 94 BPM | BODY MASS INDEX: 20.49 KG/M2 | HEIGHT: 65 IN | SYSTOLIC BLOOD PRESSURE: 103 MMHG | WEIGHT: 123 LBS | OXYGEN SATURATION: 92 %

## 2025-01-22 DIAGNOSIS — G47.00 INSOMNIA, UNSPECIFIED TYPE: ICD-10-CM

## 2025-01-22 DIAGNOSIS — F42.2 MIXED OBSESSIONAL THOUGHTS AND ACTS: ICD-10-CM

## 2025-01-22 DIAGNOSIS — F25.9 SCHIZOAFFECTIVE DISORDER, UNSPECIFIED TYPE: Primary | ICD-10-CM

## 2025-01-22 RX ORDER — OLANZAPINE 5 MG/1
TABLET ORAL
Qty: 30 TABLET | Refills: 1 | Status: SHIPPED | OUTPATIENT
Start: 2025-01-22

## 2025-01-22 NOTE — PROGRESS NOTES
"     Office  Follow Up Visit      Patient Name: Deann Coon  : 1995   MRN: [id number]     Referring Provider: Fawn Peraza APRN    Chief Complaint:  \"management of bipolar disorder, OCD, and insomnia\"    ICD-10-CM ICD-9-CM   1. Schizoaffective disorder, unspecified type  F25.9 295.70   2. Insomnia, unspecified type  G47.00 780.52   3. Mixed obsessional thoughts and acts  F42.2 300.3        History of Present Illness:   Deann Coon is a 29 y.o. female presenting for a routine follow up appointment for psychiatric medication management. Patient reports increased anxiety, primarily manifesting as fear of chemicals, with a maximum intensity of 7 out of 10. Patient leaves the house to manage anxiety, which provides some relief, and acknowledges the irrational nature of the anxiety but still experiences its effects. Patient notes improved insight into her condition and has hopes that the Caplyta will work, though nervous about the time it takes to see effects.    Patient reports experiencing more hallucinations, though not commanding. Patient denies feeling unstable. Patient's sleep patterns remain relatively unchanged, with some nights experiencing difficulty initiating sleep but sleeping well once asleep. Patient reports stable mood. Rash secondary to lamotrigine has resolved and patient confirms discontinuation of lamotrigine. Patient is taking hydroxyzine at least twice daily for anxiety management, attempting to avoid a third dose when possible.    Subjective      Review of Systems:   Ears, Nose, Mouth, & Throat: Denies difficulty hearing, smelling, sinus problems, sore throat, or dentition.  Heart/Vascular: Denies rapid heart rate, chest pain, swelling of feet or legs   Respiratory: Denies shortness of breath, cough, or wheeze  GI/: Denies nausea, vomiting, constipation, diarrhea, abdominal pain, painful urination, frequent urination  MSK: Denies swelling or joint deformities  Skin: " Denies lesions or rash  Neurologic: Denies headaches, dizziness, or tremor   Endocrine: Denies heat or cold intolerance   Hematologic: Denies easy bruising or bleeding  Psychiatric: Endorses insomnia, depression, anxiety, recurrent bad thoughts, auditory hallucinations; denies irritability, visual hallucinations, compulsions    PHQ-9 Depression Screening  Little interest or pleasure in doing things? Several days   Feeling down, depressed, or hopeless? Over half   PHQ-2 Total Score 3   Trouble falling or staying asleep, or sleeping too much? Several days   Feeling tired or having little energy? Over half   Poor appetite or overeating? Several days   Feeling bad about yourself - or that you are a failure or have let yourself or your family down? Over half   Trouble concentrating on things, such as reading the newspaper or watching television? Over half   Moving or speaking so slowly that other people could have noticed? Or the opposite - being so fidgety or restless that you have been moving around a lot more than usual? Over half   Thoughts that you would be better off dead, or of hurting yourself in some way? Not at all   PHQ-9 Total Score 13   If you checked off any problems, how difficult have these problems made it for you to do your work, take care of things at home, or get along with other people? Somewhat difficult         SILVIA-7      Over the last two weeks, how often have you been bothered by the following problems?  Feeling nervous, anxious or on edge: More than half the days  Not being able to stop or control worrying: More than half the days  Worrying too much about different things: More than half the days  Trouble Relaxing: More than half the days  Being so restless that it is hard to sit still: More than half the days  Becoming easily annoyed or irritable: Not at all  Feeling afraid as if something awful might happen: More than half the days  SILVIA 7 Total Score: 12  If you checked any problems, how  "difficult have these problems made it for you to do your work, take care of things at home, or get along with other people: Very difficult    Patient History:   The following portions of the patient's history were reviewed and updated as appropriate: allergies, current medications, past family history, past medical history, past social history, past surgical history and problem list.     Social History     Socioeconomic History    Marital status: Single   Tobacco Use    Smoking status: Never    Smokeless tobacco: Never   Vaping Use    Vaping status: Never Used   Substance and Sexual Activity    Alcohol use: Yes     Comment: sometimes    Drug use: Not Currently     Types: Marijuana    Sexual activity: Defer       History reviewed. No pertinent family history.    Medications:     Current Outpatient Medications:     hydrOXYzine (ATARAX) 25 MG tablet, Take 2 tablets by mouth 3 (Three) Times a Day As Needed for Itching. Take 1-2 tablets by mouth 3 times per day as needed for anxiety., Disp: 30 tablet, Rfl: 2    Lumateperone Tosylate (Caplyta) 42 MG capsule, Take 1 capsule by mouth every night at bedtime for 14 days. Lot NDC 54966-787-00, Disp: 14 capsule, Rfl: 0    multivitamin with minerals tablet tablet, Take 1 tablet by mouth Daily., Disp: 120 tablet, Rfl: 2    OLANZapine (ZyPREXA) 5 MG tablet, Take 1/2 tablet (2.5 mg) every night., Disp: 30 tablet, Rfl: 1    polyethylene glycol (MIRALAX) 17 GM/SCOOP powder, Take 17 g (1 CAPFUL) by mouth Daily As Needed (constipation)., Disp: 238 g, Rfl: 0    traZODone (DESYREL) 50 MG tablet, Take 1 tablet by mouth Every Night., Disp: 30 tablet, Rfl: 0    Objective     Physical Exam:  Vital Signs:   Vitals:    01/22/25 0820   BP: 103/69   Pulse: 94   SpO2: 92%   Weight: 55.8 kg (123 lb)   Height: 165.1 cm (65\")     Body mass index is 20.47 kg/m².     Mental Status Exam:   The patient was alert and oriented to time, place, person, and situation. Neatly groomed and dressed. Good eye " contact. Cooperative and answers questions willingly. No evidence of gait imbalance or shuffling. Normal speech rate, rhythm, and tone. Reports anxious mood. Affect congruent with mood. Endorses AH; denies SI/HI/VH. Organized thought process. Fear of chemicals and elevated anxiety noted; patient insightful into symptoms. Fair insight, fair judgement. Impulse control intact. Memory intact with average to above average intellectual functioning.     No Known Allergies     Current Medications:   Current Outpatient Medications   Medication Sig Dispense Refill    hydrOXYzine (ATARAX) 25 MG tablet Take 2 tablets by mouth 3 (Three) Times a Day As Needed for Itching. Take 1-2 tablets by mouth 3 times per day as needed for anxiety. 30 tablet 2    Lumateperone Tosylate (Caplyta) 42 MG capsule Take 1 capsule by mouth every night at bedtime for 14 days. Lot NDC 99076-112-39 14 capsule 0    multivitamin with minerals tablet tablet Take 1 tablet by mouth Daily. 120 tablet 2    OLANZapine (ZyPREXA) 5 MG tablet Take 1/2 tablet (2.5 mg) every night. 30 tablet 1    polyethylene glycol (MIRALAX) 17 GM/SCOOP powder Take 17 g (1 CAPFUL) by mouth Daily As Needed (constipation). 238 g 0    traZODone (DESYREL) 50 MG tablet Take 1 tablet by mouth Every Night. 30 tablet 0     No current facility-administered medications for this visit.       @RESULASTCBCDIFFPANEL,TSH,LABLIPI,FSSXXFOC92,PLWCHRMQ08,MG,FOLATE,PROLACTIN,CRPRESULT,CMP,[id number])@    Lab Results   Component Value Date    GLUCOSE 141 (H) 09/24/2024    BUN 12 09/24/2024    CREATININE 0.82 09/24/2024    EGFR 98.8 09/24/2024    BCR 14.6 09/24/2024    K 3.7 09/24/2024    CO2 25.3 09/24/2024    CALCIUM 9.8 09/24/2024    ALBUMIN 4.6 09/18/2024    BILITOT 0.4 09/18/2024    AST 19 09/18/2024    ALT 12 09/18/2024       Lab Results   Component Value Date    WBC 4.85 09/24/2024    HGB 14.8 09/24/2024    HCT 43.6 09/24/2024    MCV 89.7 09/24/2024     09/24/2024       No results found  "for: \"CHOL\", \"CHLPL\", \"TRIG\", \"HDL\", \"LDL\"               Assessment / Plan      Impression/Treatment Plan:  Deann Coon is a 29 y.o. female presenting for a routine follow up appointment for psychiatric medication management. She was initially seen October 16, 2024 in this clinic for management of obsessive-compulsive disorder, paranoia, and auditory hallucinations.  She reported the audio hallucinations started about 5 years ago and the paranoia started about a year to a year and a half ago.  She endorsed marijuana and delta 8, delta 9 use 2 weeks prior to her hospitalization at [facility] in August 2024.  She was recently discharged from psychiatric inpatient care at [facility] on October 1, 2024.  Both hospitalizations were result of new onset and worsening paranoia related to fear of poisoning.  The patient became socially withdrawn, stopped eating with significant weight loss due to fear of poisoning, and had a decline in self-care.  Medications upon recent hospital discharge include fluvoxamine 150mg daily, trazodone 25mg every night, ziprasidone 60mg BID, and Saphris 5mg BID as needed for anxiety. The patient reported during the initial evaluation that the paranoia and audio hallucinations had significantly improved with the current medication regimen.  However, she continued to hear noncommanding voices and  has some but limited insight about her paranoia.  She reported that her parents have been supportive in helping her recognize paranoid thoughts. She reported that she is interested in increasing dosages of her medications for maximal therapeutic effect, \" to get better\".  We discussed increasing zisprasidone from 60mg BID to 80mg BID today. We discussed reevaluating her symptoms and increasing fluvoxamine at the next appointment if needed.  Safety plan made and scanned in the chart.       The patient reported having an episode with voices, experiencing anxiety, and feelings of " hopelessness. The patient had started taking allergy medication, Zyrtec, which was suspected might have been interacting with Luvox. She started taking Zyrtec on October 23rd and experienced worsening symptoms by the following Monday night. She considered overdosing but decided to go to the hospital instead. The patient's medications were switched, causing further anxiety, but she is trying to engage in activities to overcome her fears, such as bleaching her hair and painting her nails. The patient was prescribed Prozac (20 mg) and Vraylar 3 mg daily, which she started taking while hospitalized. She has not taken Prozac before and reports an improvement in mood, but still experiences frequent auditory hallucinations. Prior to hospital admission, the hallucinations were aggressive, but they have since become less intense.      Today she reports increased anxiety, primarily manifesting as fear of chemicals, with a severity of up to 7 out of 10, and is currently taking hydroxyzine twice daily. The patient also experiences non-aggressive hallucinations and reports stable mood. Sleep patterns include difficulty initiating sleep but good quality once asleep, with the patient currently taking 50 mg of trazodone. The patient has been prescribed olanzapine 2.5 mg at night to replace trazodone for sleep and anxiety management. Follow-up is scheduled in one week to assess the response to the new medication regimen.    1. Schizoaffective disorder/Anxiety:  - The patient reports increased anxiety, mainly fear of chemicals, with a severity of up to 7 out of 10. She is currently taking hydroxyzine at least twice a day and is testing managing without a third dose.  Plan:    - Start olanzapine 2.5 mg at night. Common side effects associated with this medication include dry mouth, constipation, indigestion, weight gain, and dizziness. Use caution when going from a sitting to standing position due to potential dizziness. There is a  potential for weight gain while using this medication with an increased risk for diabetes and high cholesterol. Additionally, there is a risk of developing involuntary movements of the head, neck, and body that can be irreversible. Severe side effects include hyperglycemia requiring emergent care, a rare but serious skin condition (DRESS syndrome) causing a rash, itching, fatigue, and fever. Rarely, a condition called neuroleptic malignant syndrome (NMS) can occur with symptoms of fever, muscle rigidity, changes in mental status, blood pressure and kidney function. Call the office with any questions or concerns or go to the ED Emergency department in case of an emergency.       - Continue hydroxyzine as needed for anxiety.    2. Hallucinations:  - The patient reports experiencing more hallucinations, but they are not aggressive or severe enough to cause significant distress.  Plan:    - Continue Caplyta 42 mg daily.    - Monitor the patient's response to the new olanzapine regimen.    3. Insomnia:  - The patient reports difficulty falling asleep on some nights but sleeps well once asleep. She is currently taking 50 mg of trazodone for sleep.  Plan:    - Discontinue trazodone.    - Start olanzapine 2.5 mg at night.    4. Rash:  - The patient reports that the rash has resolved. Lamotrigine discontinued 2 weeks ago.  Plan:    - No further intervention needed at this time.    Follow-up:  - Schedule a follow-up appointment in one week to assess the patient's response to the new medication regimen and address any concerns.        Diagnoses and all orders for this visit:    1. Schizoaffective disorder, unspecified type (Primary)    2. Insomnia, unspecified type  -     OLANZapine (ZyPREXA) 5 MG tablet; Take 1/2 tablet (2.5 mg) every night.  Dispense: 30 tablet; Refill: 1    3. Mixed obsessional thoughts and acts  -     OLANZapine (ZyPREXA) 5 MG tablet; Take 1/2 tablet (2.5 mg) every night.  Dispense: 30 tablet; Refill:  1        MEDS ORDERED DURING VISIT:  New Medications Ordered This Visit   Medications    OLANZapine (ZyPREXA) 5 MG tablet     Sig: Take 1/2 tablet (2.5 mg) every night.     Dispense:  30 tablet     Refill:  1       Follow up with Khadra Gandhi, DNP, APRN, PMHNP-BC in this office in 1 week.          PATIENT EDUCATION:  Discussed medication options and treatment plan of prescribed medication(s) as well as the risks, benefits, and potential side effects. Patient is agreeable to call the office with any worsening of symptoms or onset of side effects. Patient is agreeable to call 911 or go to the nearest ER should he/she begin having SI/HI.    SAFETY PLAN:  Patient was given ample time for questions and fully participated in treatment planning.  Patient was encouraged to call the clinic with any questions or concerns.  Patient was informed of access to emergency care. If patient were to develop any significant symptomatology, suicidal ideation, homicidal ideation, any concerns, or feel unsafe at any time they are to call the clinic and if unable to get immediate assistance should immediately call 911 or go to the nearest emergency room.  The patient is advised to remove or secure (lock away) all lethal weapons (including guns) and sharps (including razors, scissors, knives, etc.).  All medications (including any prescribed and any over the counter medications) should be stored in a safe and secured location that is not obtainable by children/adolescents.  Patient was given an opportunity and encouraged to ask questions about their medication, illness, and treatment. Patient contracted verbally for the following: If you are experiencing an emotional crisis or have thoughts of harming yourself or others, please go to your nearest local emergency room or call 911. Will continue to re-assess medication response and side effects frequently to establish efficacy and ensure safety. Risks, any black box warnings, side  effects, off label usage, and benefits of medication and treatment discussed with patient, along with potential adverse side effects of current and/or newly prescribed medication, alternative treatment options, and OTC medications.  Patient verbalized understanding of potential risks, any off label use of medication, any black box warnings, and any side effects in their own words. The patient verbalized understanding and agreed to comply with the safety plan discussed in their own words.  Patient given the number to the office. Number also available to the 24- hour suicide hotline.     Short Term Goals: Continue building rapport with the patient. Patient will be compliant with medication, and patient will have no significant medication related side effects.  Patient will be engaged in psychotherapy as indicated.  Patient will report subjective improvement of symptoms.     Long term goals: To stabilize mood and treat/improve subjective symptoms, the patient will stay out of the hospital, the patient will be at an optimal level of functioning, and the patient will take all medications as prescribed.     Mikhail reviewed and is appropriate.    Copied text in this note has been reviewed and is accurate as of 1/22/2025.    Part of this note may be an electronic transcription/translation of spoken language to printed text using the Dragon Dictation System.    Khadra Gandhi, DNP, APRN, PMHNP-BC

## 2025-01-29 ENCOUNTER — OFFICE VISIT (OUTPATIENT)
Age: 30
End: 2025-01-29
Payer: COMMERCIAL

## 2025-01-29 VITALS
HEART RATE: 66 BPM | DIASTOLIC BLOOD PRESSURE: 71 MMHG | BODY MASS INDEX: 21.66 KG/M2 | SYSTOLIC BLOOD PRESSURE: 103 MMHG | HEIGHT: 65 IN | OXYGEN SATURATION: 92 % | WEIGHT: 130 LBS

## 2025-01-29 DIAGNOSIS — F31.60 BIPOLAR AFFECTIVE DISORDER, MIXED: Primary | ICD-10-CM

## 2025-01-29 DIAGNOSIS — G47.00 INSOMNIA, UNSPECIFIED TYPE: ICD-10-CM

## 2025-01-29 DIAGNOSIS — F42.2 MIXED OBSESSIONAL THOUGHTS AND ACTS: ICD-10-CM

## 2025-01-29 NOTE — PROGRESS NOTES
"     Office  Follow Up Visit      Patient Name: Deann Coon  : 1995   MRN: [id number]     Referring Provider: Fawn Peraza APRN    Chief Complaint:  \"management of bipolar disorder, OCD, and insomnia\"      ICD-10-CM ICD-9-CM   1. Bipolar affective disorder, mixed  F31.60 296.60   2. Insomnia, unspecified type  G47.00 780.52   3. Mixed obsessional thoughts and acts  F42.2 300.3        History of Present Illness:   Deann Coon is a 29 y.o. female presenting for a routine follow up appointment for psychiatric medication management. The patient reports worsening psychiatric symptoms, including auditory hallucinations that have increased in severity but remain manageable. She has not yet started the newly prescribed medication olanzapine due to fears that it might alter her perception of reality, but she expresses a strong intention to begin the medication today due to symptom exacerbation and an internal alvares about taking it. The patient reports poor sleep due to racing thoughts and acknowledges feeling emotionally worse recently.    The patient discloses a recent episode of heavy alcohol consumption four days ago, which resulted in significant illness, noting that this behavior is atypical for her. She acknowledges making poor decisions recently but expresses a desire to improve her situation.    The patient denies current suicidal ideation but recognizes that feelings of helplessness or uncontrollable panic would necessitate emergency care. She reports obtaining employment at [employer] and note that her parents have limited awareness of her current mental health status, although they are aware of the recent alcohol consumption and its effects.    Subjective      Review of Systems:   Ears, Nose, Mouth, & Throat: Denies difficulty hearing, smelling, sinus problems, sore throat, or dentition.  Heart/Vascular: Denies rapid heart rate, chest pain, swelling of feet or legs   Respiratory: Denies " shortness of breath, cough, or wheeze  GI/: Denies nausea, vomiting, constipation, diarrhea, abdominal pain, painful urination, frequent urination  MSK: Denies swelling or joint deformities  Skin: Denies lesions or rash  Neurologic: Denies headaches, dizziness, or tremor   Endocrine: Denies heat or cold intolerance   Hematologic: Denies easy bruising or bleeding  Psychiatric: Endorses insomnia, anxiety, depression, and worsening audio hallucinations; denies irritability, mood swings, visual hallucinations, compulsions    PHQ-9 Depression Screening  Little interest or pleasure in doing things? Several days   Feeling down, depressed, or hopeless? Over half   PHQ-2 Total Score 3   Trouble falling or staying asleep, or sleeping too much? Several days   Feeling tired or having little energy? Over half   Poor appetite or overeating? Several days   Feeling bad about yourself - or that you are a failure or have let yourself or your family down? Almost all   Trouble concentrating on things, such as reading the newspaper or watching television? Over half   Moving or speaking so slowly that other people could have noticed? Or the opposite - being so fidgety or restless that you have been moving around a lot more than usual? Several days   Thoughts that you would be better off dead, or of hurting yourself in some way? Not at all   PHQ-9 Total Score 13   If you checked off any problems, how difficult have these problems made it for you to do your work, take care of things at home, or get along with other people? Very difficult         SILVIA-7      Over the last two weeks, how often have you been bothered by the following problems?  Feeling nervous, anxious or on edge: More than half the days  Not being able to stop or control worrying: More than half the days  Worrying too much about different things: Several days  Trouble Relaxing: More than half the days  Being so restless that it is hard to sit still: Several days  Becoming  easily annoyed or irritable: Not at all  Feeling afraid as if something awful might happen: More than half the days  SILVIA 7 Total Score: 10  If you checked any problems, how difficult have these problems made it for you to do your work, take care of things at home, or get along with other people: Very difficult    Patient History:   The following portions of the patient's history were reviewed and updated as appropriate: allergies, current medications, past family history, past medical history, past social history, past surgical history and problem list.     Social History     Socioeconomic History    Marital status: Single   Tobacco Use    Smoking status: Never    Smokeless tobacco: Never   Vaping Use    Vaping status: Never Used   Substance and Sexual Activity    Alcohol use: Yes     Comment: sometimes    Drug use: Not Currently     Types: Marijuana    Sexual activity: Defer       History reviewed. No pertinent family history.    Medications:     Current Outpatient Medications:     hydrOXYzine (ATARAX) 25 MG tablet, Take 2 tablets by mouth 3 (Three) Times a Day As Needed for Itching. Take 1-2 tablets by mouth 3 times per day as needed for anxiety., Disp: 30 tablet, Rfl: 2    Lumateperone Tosylate (Caplyta) 42 MG capsule, Take 1 capsule by mouth every night at bedtime for 14 days. Lot NDC 92693-124-82, Disp: 14 capsule, Rfl: 0    multivitamin with minerals tablet tablet, Take 1 tablet by mouth Daily., Disp: 120 tablet, Rfl: 2    OLANZapine (ZyPREXA) 5 MG tablet, Take 1/2 tablet (2.5 mg) every night., Disp: 30 tablet, Rfl: 1    polyethylene glycol (MIRALAX) 17 GM/SCOOP powder, Take 17 g (1 CAPFUL) by mouth Daily As Needed (constipation)., Disp: 238 g, Rfl: 0    sulfamethoxazole-trimethoprim (BACTRIM DS,SEPTRA DS) 800-160 MG per tablet, Take 1 tablet by mouth 2 (Two) Times a Day for 5 doses., Disp: 5 tablet, Rfl: 0    traZODone (DESYREL) 50 MG tablet, Take 1 tablet by mouth Every Night., Disp: 30 tablet, Rfl: 0  No  "current facility-administered medications for this visit.    Objective     Physical Exam:  Vital Signs:   Vitals:    01/29/25 0945   BP: 103/71   Pulse: 66   SpO2: 92%   Weight: 59 kg (130 lb)   Height: 165.1 cm (65\")     Body mass index is 21.63 kg/m².     Mental Status Exam:   The patient was alert and oriented to time, place, person, and situation. Neatly groomed and dressed. Good eye contact. Cooperative and answers questions willingly. No evidence of gait imbalance or shuffling. Normal speech rate, rhythm, and tone. Reports anxious mood. Affect congruent with mood. Denies SI/HI/VH. Endorses AH. Organized thought process. No evidence of delusional thinking. Fair insight, poor judgement. Impulse control intact. Memory intact with average to above average intellectual functioning.     No Known Allergies     Patient's last menstrual period was 01/21/2025.   Birth Control:    Current Medications:   Current Outpatient Medications   Medication Sig Dispense Refill    hydrOXYzine (ATARAX) 25 MG tablet Take 2 tablets by mouth 3 (Three) Times a Day As Needed for Itching. Take 1-2 tablets by mouth 3 times per day as needed for anxiety. 30 tablet 2    Lumateperone Tosylate (Caplyta) 42 MG capsule Take 1 capsule by mouth every night at bedtime for 14 days. Lot NDC 45739-373-09 14 capsule 0    multivitamin with minerals tablet tablet Take 1 tablet by mouth Daily. 120 tablet 2    OLANZapine (ZyPREXA) 5 MG tablet Take 1/2 tablet (2.5 mg) every night. 30 tablet 1    polyethylene glycol (MIRALAX) 17 GM/SCOOP powder Take 17 g (1 CAPFUL) by mouth Daily As Needed (constipation). 238 g 0    sulfamethoxazole-trimethoprim (BACTRIM DS,SEPTRA DS) 800-160 MG per tablet Take 1 tablet by mouth 2 (Two) Times a Day for 5 doses. 5 tablet 0    traZODone (DESYREL) 50 MG tablet Take 1 tablet by mouth Every Night. 30 tablet 0     No current facility-administered medications for this visit. " "      @RESULASTCBCDIFFPANEL,TSH,LABLIPI,HHNYAFWW84,HTYFZDET38,MG,FOLATE,PROLACTIN,CRPRESULT,CMP,[id number])@    Lab Results   Component Value Date    GLUCOSE 141 (H) 09/24/2024    BUN 12 09/24/2024    CREATININE 0.82 09/24/2024    EGFR 98.8 09/24/2024    BCR 14.6 09/24/2024    K 3.7 09/24/2024    CO2 25.3 09/24/2024    CALCIUM 9.8 09/24/2024    ALBUMIN 4.6 09/18/2024    BILITOT 0.4 09/18/2024    AST 19 09/18/2024    ALT 12 09/18/2024       Lab Results   Component Value Date    WBC 4.85 09/24/2024    HGB 14.8 09/24/2024    HCT 43.6 09/24/2024    MCV 89.7 09/24/2024     09/24/2024       No results found for: \"CHOL\", \"CHLPL\", \"TRIG\", \"HDL\", \"LDL\"               Assessment / Plan      Impression/Treatment Plan:  Deann Coon is a 29 y.o. female presenting for a routine follow up appointment for psychiatric medication management. She was initially seen October 16, 2024 in this clinic for management of obsessive-compulsive disorder, paranoia, and auditory hallucinations.  She reported the audio hallucinations started about 5 years ago and the paranoia started about a year to a year and a half ago.  She endorsed marijuana and delta 8, delta 9 use 2 weeks prior to her hospitalization at [facility] in August 2024.  She was recently discharged from psychiatric inpatient care at [place] on October 1, 2024.  Both hospitalizations were result of new onset and worsening paranoia related to fear of poisoning.  The patient became socially withdrawn, stopped eating with significant weight loss due to fear of poisoning, and had a decline in self-care.  Medications upon recent hospital discharge include fluvoxamine 150mg daily, trazodone 25mg every night, ziprasidone 60mg BID, and Saphris 5mg BID as needed for anxiety. The patient reported during the initial evaluation that the paranoia and audio hallucinations had significantly improved with the current medication regimen.  However, she continued to hear " "noncommanding voices and  has some but limited insight about her paranoia.  She reported that her parents have been supportive in helping her recognize paranoid thoughts. She reported that she is interested in increasing dosages of her medications for maximal therapeutic effect, \" to get better\".  We discussed increasing zisprasidone from 60mg BID to 80mg BID today. We discussed reevaluating her symptoms and increasing fluvoxamine at the next appointment if needed.  Safety plan made and scanned in the chart.       The patient reported having an episode with voices, experiencing anxiety, and feelings of hopelessness. The patient had started taking allergy medication, Zyrtec, which was suspected might have been interacting with Luvox. She started taking Zyrtec on October 23rd and experienced worsening symptoms by the following Monday night. She considered overdosing but decided to go to the hospital instead. The patient's medications were switched, causing further anxiety, but she is trying to engage in activities to overcome her fears, such as bleaching her hair and painting her nails. The patient was prescribed Prozac (20 mg) and Vraylar 3 mg daily, which she started taking while hospitalized. She has not taken Prozac before and reports an improvement in mood, but still experiences frequent auditory hallucinations. Prior to hospital admission, the hallucinations were aggressive, but they have since become less intense.       Today the patient reports worsening psychiatric symptoms, including increased auditory hallucinations and poor sleep due to racing thoughts. She has not yet started the prescribed olanzapine medication but plans to begin today. The patient disclosed a recent episode of heavy alcohol consumption, which exacerbated her symptoms. She denies current suicidal ideation but expresses concerns about her mental health and the potential interaction between alcohol and her medication. The patient has " started a new job and her parents are partially aware of her situation. A follow-up appointment is scheduled in one week to assess her response to the medication and overall mental health status.    1. Medication non-adherence:     - The patient has not been taking the prescribed olanzapine and reports worsening symptoms. She has picked up the medication and plans to start taking it today.     - Encourage the patient to take the medication as prescribed and monitor for any side effects or improvements in symptoms. Instructed to call 911 or go to the ED in case of an emergency.    2. Insomnia:     - The patient reports difficulty sleeping and increased alcohol consumption.     - Advise the patient to avoid heavy alcohol consumption, especially while taking psychiatric medications.     - Continue hydroxyzine as needed for anxiety and sleep.    3. Auditory hallucinations:     - The patient reports worsening voices but still manageable.     - Monitor the patient's response to olanzapine and consider adjusting the dose if necessary. Continue Caplyta 42 mg daily.    4. Alcohol consumption:     - The patient admits to heavy drinking four days ago and feeling unwell afterward.     - Counseled the patient on the risks of heavy alcohol consumption, especially in combination with her medications.    5. Anxiety and fear:     - The patient expresses concerns about her mental health, particularly related to the fear of chemicals and giving up control.     - Reassured the patient that setbacks are normal and encouraged her to continue with her treatment plan.    6. Safety and emergency plan:     - The patient denies current thoughts of self-harm and feels able to manage her symptoms.     - Instructed the patient to go to the emergency room if she feels her symptoms are getting out of control or if she experiences thoughts of self-harm.    Follow-up:  - Schedule a follow-up appointment in one week to assess the patient's response  to olanzapine and overall mental health status.      Diagnoses and all orders for this visit:    1. Bipolar affective disorder, mixed (Primary)    2. Insomnia, unspecified type    3. Mixed obsessional thoughts and acts        MEDS ORDERED DURING VISIT:  No orders of the defined types were placed in this encounter.      Follow up with Khadra Gandhi, KHUSHBU, APRN, PMSHAQUILLEP-BC in this office in 1 week.          PATIENT EDUCATION:  Discussed medication options and treatment plan of prescribed medication(s) as well as the risks, benefits, and potential side effects. Patient is agreeable to call the office with any worsening of symptoms or onset of side effects. Patient is agreeable to call 911 or go to the nearest ER should he/she begin having SI/HI.    SAFETY PLAN:  Patient was given ample time for questions and fully participated in treatment planning.  Patient was encouraged to call the clinic with any questions or concerns.  Patient was informed of access to emergency care. If patient were to develop any significant symptomatology, suicidal ideation, homicidal ideation, any concerns, or feel unsafe at any time they are to call the clinic and if unable to get immediate assistance should immediately call 911 or go to the nearest emergency room.  The patient is advised to remove or secure (lock away) all lethal weapons (including guns) and sharps (including razors, scissors, knives, etc.).  All medications (including any prescribed and any over the counter medications) should be stored in a safe and secured location that is not obtainable by children/adolescents.  Patient was given an opportunity and encouraged to ask questions about their medication, illness, and treatment. Patient contracted verbally for the following: If you are experiencing an emotional crisis or have thoughts of harming yourself or others, please go to your nearest local emergency room or call 911. Will continue to re-assess medication response  and side effects frequently to establish efficacy and ensure safety. Risks, any black box warnings, side effects, off label usage, and benefits of medication and treatment discussed with patient, along with potential adverse side effects of current and/or newly prescribed medication, alternative treatment options, and OTC medications.  Patient verbalized understanding of potential risks, any off label use of medication, any black box warnings, and any side effects in their own words. The patient verbalized understanding and agreed to comply with the safety plan discussed in their own words.  Patient given the number to the office. Number also available to the 24- hour suicide hotline.     Short Term Goals: Continue building rapport with the patient. Patient will be compliant with medication, and patient will have no significant medication related side effects.  Patient will be engaged in psychotherapy as indicated.  Patient will report subjective improvement of symptoms.     Long term goals: To stabilize mood and treat/improve subjective symptoms, the patient will stay out of the hospital, the patient will be at an optimal level of functioning, and the patient will take all medications as prescribed.     Mikhail reviewed and is appropriate.    Copied text in this note has been reviewed and is accurate as of 1/29/2025.    Part of this note may be an electronic transcription/translation of spoken language to printed text using the Dragon Dictation System.    Khadra Gandhi, DNP, APRN, PMHNP-BC

## 2025-02-05 ENCOUNTER — OFFICE VISIT (OUTPATIENT)
Age: 30
End: 2025-02-05
Payer: COMMERCIAL

## 2025-02-05 VITALS
SYSTOLIC BLOOD PRESSURE: 104 MMHG | HEART RATE: 76 BPM | HEIGHT: 65 IN | DIASTOLIC BLOOD PRESSURE: 71 MMHG | OXYGEN SATURATION: 96 % | WEIGHT: 117 LBS | BODY MASS INDEX: 19.49 KG/M2

## 2025-02-05 DIAGNOSIS — F42.2 MIXED OBSESSIONAL THOUGHTS AND ACTS: ICD-10-CM

## 2025-02-05 DIAGNOSIS — G47.00 INSOMNIA, UNSPECIFIED TYPE: ICD-10-CM

## 2025-02-05 DIAGNOSIS — F25.9 SCHIZOAFFECTIVE DISORDER, UNSPECIFIED TYPE: ICD-10-CM

## 2025-02-05 RX ORDER — LUMATEPERONE 42 MG/1
1 CAPSULE ORAL
Qty: 30 CAPSULE | Refills: 2 | Status: SHIPPED | OUTPATIENT
Start: 2025-02-05

## 2025-02-05 RX ORDER — OLANZAPINE 5 MG/1
TABLET ORAL
Qty: 30 TABLET | Refills: 1 | Status: SHIPPED | OUTPATIENT
Start: 2025-02-05

## 2025-02-05 NOTE — PROGRESS NOTES
"     Office  Follow Up Visit      Patient Name: Deann Coon  : 1995   MRN: [id number]     Referring Provider: Fawn Peraza APRN    Chief Complaint:  \"management of schizoaffective disorder, OCD, and insomnia\"      ICD-10-CM ICD-9-CM   1. Insomnia, unspecified type  G47.00 780.52   2. Mixed obsessional thoughts and acts  F42.2 300.3   3. Schizoaffective disorder, unspecified type  F25.9 295.70        History of Present Illness:   Deann Coon is a 29 y.o. female presenting for a routine follow up appointment for psychiatric medication management. Patient reports currently taking olanzapine 5 mg at night, which has been helping with sleep. She reports sleeping approximately 10-11 hours per night. She experiences daily anxiety and thoughts of poisoning but reports having a better handle on these symptoms. She acknowledges ongoing auditory hallucinations, which are not aggressive and are described as manageable, occurring randomly throughout the day. The patient denies thoughts of harming herself or others. She is working for [employer] every day, which helps keep her mind occupied. The patient is taking olanzapine and hydroxyzine, though she reports using hydroxyzine less frequently to face her anxiety directly. The patient is considering increasing the olanzapine dosage to 7.5mg and is currently taking Caplyta samples, needing a prescription. She is not taking trazodone currently.    Subjective      Review of Systems:   Ears, Nose, Mouth, & Throat: Denies difficulty hearing, smelling, sinus problems, sore throat, or dentition.  Heart/Vascular: Denies rapid heart rate, chest pain, swelling of feet or legs   Respiratory: Denies shortness of breath, cough, or wheeze  GI/: Denies nausea, vomiting, constipation, diarrhea, abdominal pain, painful urination, frequent urination  MSK: Denies swelling or joint deformities  Skin: Denies lesions or rash  Neurologic: Denies headaches, dizziness, or " tremor   Endocrine: Denies heat or cold intolerance   Hematologic: Denies easy bruising or bleeding  Psychiatric: Endorses anxiety, paranoid thoughts, auditory hallucinations; denies Insomnia, irritability, depression,  mood swings, visual hallucinations, compulsions    PHQ-9 Depression Screening  Little interest or pleasure in doing things? Several days   Feeling down, depressed, or hopeless? Over half   PHQ-2 Total Score 3   Trouble falling or staying asleep, or sleeping too much? Several days   Feeling tired or having little energy? Over half   Poor appetite or overeating? Over half   Feeling bad about yourself - or that you are a failure or have let yourself or your family down? Several days   Trouble concentrating on things, such as reading the newspaper or watching television? Over half   Moving or speaking so slowly that other people could have noticed? Or the opposite - being so fidgety or restless that you have been moving around a lot more than usual? Several days   Thoughts that you would be better off dead, or of hurting yourself in some way? Not at all   PHQ-9 Total Score 12   If you checked off any problems, how difficult have these problems made it for you to do your work, take care of things at home, or get along with other people? Somewhat difficult         SILVIA-7      Over the last two weeks, how often have you been bothered by the following problems?  Feeling nervous, anxious or on edge: More than half the days  Not being able to stop or control worrying: More than half the days  Worrying too much about different things: Several days  Trouble Relaxing: More than half the days  Being so restless that it is hard to sit still: Several days  Becoming easily annoyed or irritable: Not at all  Feeling afraid as if something awful might happen: More than half the days  SILVIA 7 Total Score: 10  If you checked any problems, how difficult have these problems made it for you to do your work, take care of things  "at home, or get along with other people: Somewhat difficult    Patient History:   The following portions of the patient's history were reviewed and updated as appropriate: allergies, current medications, past family history, past medical history, past social history, past surgical history and problem list.     Social History     Socioeconomic History    Marital status: Single   Tobacco Use    Smoking status: Never    Smokeless tobacco: Never   Vaping Use    Vaping status: Never Used   Substance and Sexual Activity    Alcohol use: Yes     Comment: sometimes    Drug use: Not Currently     Types: Marijuana    Sexual activity: Defer       History reviewed. No pertinent family history.    Medications:     Current Outpatient Medications:     OLANZapine (ZyPREXA) 5 MG tablet, Take 1 1/2 tablet (7.5 mg) every night., Disp: 30 tablet, Rfl: 1    hydrOXYzine (ATARAX) 25 MG tablet, Take 2 tablets by mouth 3 (Three) Times a Day As Needed for Itching. Take 1-2 tablets by mouth 3 times per day as needed for anxiety., Disp: 30 tablet, Rfl: 2    Lumateperone Tosylate (Caplyta) 42 MG capsule, Take 1 capsule by mouth every night at bedtime., Disp: 30 capsule, Rfl: 2    multivitamin with minerals tablet tablet, Take 1 tablet by mouth Daily., Disp: 120 tablet, Rfl: 2    polyethylene glycol (MIRALAX) 17 GM/SCOOP powder, Take 17 g (1 CAPFUL) by mouth Daily As Needed (constipation)., Disp: 238 g, Rfl: 0    traZODone (DESYREL) 50 MG tablet, Take 1 tablet by mouth Every Night., Disp: 30 tablet, Rfl: 0    Objective     Physical Exam:  Vital Signs:   Vitals:    02/05/25 1002   BP: 104/71   Pulse: 76   SpO2: 96%   Weight: 53.1 kg (117 lb)   Height: 165.1 cm (65\")     Body mass index is 19.47 kg/m².     Mental Status Exam:   The patient was alert and oriented to time, place, person, and situation. Neatly groomed and dressed. Good eye contact. Cooperative and answers questions willingly. No evidence of gait imbalance or shuffling. Normal speech " "rate, rhythm, and tone. Reports euthymic mood. Affect congruent with mood. Denies SI/HI/AVH. Organized thought process. No evidence of delusional thinking. Fair insight, fair judgement. Impulse control intact. Memory intact with average to above average intellectual functioning.     No Known Allergies     Patient's last menstrual period was 01/21/2025.     Current Medications:   Current Outpatient Medications   Medication Sig Dispense Refill    OLANZapine (ZyPREXA) 5 MG tablet Take 1 1/2 tablet (7.5 mg) every night. 30 tablet 1    hydrOXYzine (ATARAX) 25 MG tablet Take 2 tablets by mouth 3 (Three) Times a Day As Needed for Itching. Take 1-2 tablets by mouth 3 times per day as needed for anxiety. 30 tablet 2    Lumateperone Tosylate (Caplyta) 42 MG capsule Take 1 capsule by mouth every night at bedtime. 30 capsule 2    multivitamin with minerals tablet tablet Take 1 tablet by mouth Daily. 120 tablet 2    polyethylene glycol (MIRALAX) 17 GM/SCOOP powder Take 17 g (1 CAPFUL) by mouth Daily As Needed (constipation). 238 g 0    traZODone (DESYREL) 50 MG tablet Take 1 tablet by mouth Every Night. 30 tablet 0     No current facility-administered medications for this visit.       @RESULASTCBCDIFFPANEL,TSH,LABLIPI,[id number],RHNIUUOB53,MG,FOLATE,PROLACTIN,CRPRESULT,CMP,[id number])@    Lab Results   Component Value Date    GLUCOSE 141 (H) 09/24/2024    BUN 12 09/24/2024    CREATININE 0.82 09/24/2024    EGFR 98.8 09/24/2024    BCR 14.6 09/24/2024    K 3.7 09/24/2024    CO2 25.3 09/24/2024    CALCIUM 9.8 09/24/2024    ALBUMIN 4.6 09/18/2024    BILITOT 0.4 09/18/2024    AST 19 09/18/2024    ALT 12 09/18/2024       Lab Results   Component Value Date    WBC 4.85 09/24/2024    HGB 14.8 09/24/2024    HCT 43.6 09/24/2024    MCV 89.7 09/24/2024     09/24/2024       No results found for: \"CHOL\", \"CHLPL\", \"TRIG\", \"HDL\", \"LDL\"               Assessment / Plan      Impression/Treatment Plan:  Deann Coon is a 29 y.o. female " "presenting for a routine follow up appointment for psychiatric medication management. She was initially seen October 16, 2024 in this clinic for management of obsessive-compulsive disorder, paranoia, and auditory hallucinations.  She reported the audio hallucinations started about 5 years ago and the paranoia started about a year to a year and a half ago.  She endorsed marijuana and delta 8, delta 9 use 2 weeks prior to her hospitalization at [facility] in August 2024.  She was recently discharged from psychiatric inpatient care at [facility] on October 1, 2024.  Both hospitalizations were result of new onset and worsening paranoia related to fear of poisoning.  The patient became socially withdrawn, stopped eating with significant weight loss due to fear of poisoning, and had a decline in self-care.  Medications upon recent hospital discharge include fluvoxamine 150mg daily, trazodone 25mg every night, ziprasidone 60mg BID, and Saphris 5mg BID as needed for anxiety. The patient reported during the initial evaluation that the paranoia and audio hallucinations had significantly improved with the current medication regimen.  However, she continued to hear noncommanding voices and  has some but limited insight about her paranoia.  She reported that her parents have been supportive in helping her recognize paranoid thoughts. She reported that she is interested in increasing dosages of her medications for maximal therapeutic effect, \" to get better\".  We discussed increasing zisprasidone from 60mg BID to 80mg BID today. We discussed reevaluating her symptoms and increasing fluvoxamine at the next appointment if needed.  Safety plan made and scanned in the chart.       The patient reported having an episode with voices, experiencing anxiety, and feelings of hopelessness. The patient had started taking allergy medication, Zyrtec, which was suspected might have been interacting with Luvox. She started taking " Zyrtec on October 23rd and experienced worsening symptoms by the following Monday night. She considered overdosing but decided to go to the hospital instead. The patient's medications were switched, causing further anxiety, but she is trying to engage in activities to overcome her fears, such as bleaching her hair and painting her nails. The patient was prescribed Prozac (20 mg) and Vraylar 3 mg daily, which she started taking while hospitalized. She has not taken Prozac before and reports an improvement in mood, but still experiences frequent auditory hallucinations. Prior to hospital admission, the hallucinations were aggressive, but they have since become less intense.       Today the patient reports improved sleep with olanzapine and better management of daily anxiety and thoughts of poisoning. She experiences non-aggressive auditory hallucinations and moments of strong hopelessness but denies any thoughts of self-harm or harm to others. The patient is working daily with [employer] to keep her mind occupied. The plan includes increasing olanzapine to 7.5 mg, continuing Caplyta, and continuing hydroxyzine for sleep and anxiety management. A follow-up appointment is scheduled in 2 weeks.    1. Medication adjustment period:     - The patient reports making better decisions and feeling in a better place overall.     - Continue monitoring the patient's progress and response to the new medications.    2. Anxiety and paranoia:     - The patient reports daily thoughts of poisoning but has a better handle on it. She has reduced her hydroxyzine intake and believes olanzapine may be helping with anxiety.     - Continue olanzapine and monitor its effectiveness in managing anxiety.    3. Sleep disturbances:     - The patient reports improved sleep with the use of olanzapine at night.     - Continue the current regimen of olanzapine for sleep improvement.    4. Auditory hallucinations:     - The patient reports experiencing  non-aggressive, manageable hallucinations that are random throughout the day.      - Monitor the patient's response to olanzapine and consider adjusting the dose if necessary.    5. Mood and suicidal ideation:     - The patient denies any thoughts of self-harm or harm to others.      - Encourage the patient to continue engaging in activities that help improve her mood, such as working with [employer], which she does daily to keep her mind busy.    6. Medication management:     - Increase olanzapine to 7.5 mg and provide a refill.     - Prescribe Caplyta (lumateperone) 42 mg daily. The patient was given two 7-day samples to cover until prescription approved by insurance..     - Continue hydroxyzine 25-50 mg three times per day as needed for anxiety    7. Family communication:     - The patient has not discussed her mental health concerns with her parents due to fear of causing them stress. Spoke with patient's mother on the phone regarding concern of increasing paranoid thoughts and continued medication adherence. The patient's mother will monitor the patient and call the office with any concerns or utilize the ED in case of an emergent situation.    8. Follow-up:     - Schedule a follow-up appointment in 2 weeks to assess the patient's response to the medication adjustments.     - Encourage the patient to call if she needs assistance in the meantime.      Diagnoses and all orders for this visit:    1. Insomnia, unspecified type  -     OLANZapine (ZyPREXA) 5 MG tablet; Take 1 1/2 tablet (7.5 mg) every night.  Dispense: 30 tablet; Refill: 1  -     Lumateperone Tosylate (Caplyta) 42 MG capsule; Take 1 capsule by mouth every night at bedtime.  Dispense: 30 capsule; Refill: 2    2. Mixed obsessional thoughts and acts  -     OLANZapine (ZyPREXA) 5 MG tablet; Take 1 1/2 tablet (7.5 mg) every night.  Dispense: 30 tablet; Refill: 1  -     Lumateperone Tosylate (Caplyta) 42 MG capsule; Take 1 capsule by mouth every night at  bedtime.  Dispense: 30 capsule; Refill: 2    3. Schizoaffective disorder, unspecified type  -     OLANZapine (ZyPREXA) 5 MG tablet; Take 1 1/2 tablet (7.5 mg) every night.  Dispense: 30 tablet; Refill: 1  -     Lumateperone Tosylate (Caplyta) 42 MG capsule; Take 1 capsule by mouth every night at bedtime.  Dispense: 30 capsule; Refill: 2        MEDS ORDERED DURING VISIT:  New Medications Ordered This Visit   Medications    OLANZapine (ZyPREXA) 5 MG tablet     Sig: Take 1 1/2 tablet (7.5 mg) every night.     Dispense:  30 tablet     Refill:  1    Lumateperone Tosylate (Caplyta) 42 MG capsule     Sig: Take 1 capsule by mouth every night at bedtime.     Dispense:  30 capsule     Refill:  2       Follow up with Khadra Gandhi, KHUSHBU, APRN, PMHNP-BC in this office in 2 weeks.          PATIENT EDUCATION:  Discussed medication options and treatment plan of prescribed medication(s) as well as the risks, benefits, and potential side effects. Patient is agreeable to call the office with any worsening of symptoms or onset of side effects. Patient is agreeable to call 911 or go to the nearest ER should he/she begin having SI/HI.    SAFETY PLAN:  Patient was given ample time for questions and fully participated in treatment planning.  Patient was encouraged to call the clinic with any questions or concerns.  Patient was informed of access to emergency care. If patient were to develop any significant symptomatology, suicidal ideation, homicidal ideation, any concerns, or feel unsafe at any time they are to call the clinic and if unable to get immediate assistance should immediately call 911 or go to the nearest emergency room.  The patient is advised to remove or secure (lock away) all lethal weapons (including guns) and sharps (including razors, scissors, knives, etc.).  All medications (including any prescribed and any over the counter medications) should be stored in a safe and secured location that is not obtainable by  children/adolescents.  Patient was given an opportunity and encouraged to ask questions about their medication, illness, and treatment. Patient contracted verbally for the following: If you are experiencing an emotional crisis or have thoughts of harming yourself or others, please go to your nearest local emergency room or call 911. Will continue to re-assess medication response and side effects frequently to establish efficacy and ensure safety. Risks, any black box warnings, side effects, off label usage, and benefits of medication and treatment discussed with patient, along with potential adverse side effects of current and/or newly prescribed medication, alternative treatment options, and OTC medications.  Patient verbalized understanding of potential risks, any off label use of medication, any black box warnings, and any side effects in their own words. The patient verbalized understanding and agreed to comply with the safety plan discussed in their own words.  Patient given the number to the office. Number also available to the 24- hour suicide hotline.     Short Term Goals: Continue building rapport with the patient. Patient will be compliant with medication, and patient will have no significant medication related side effects.  Patient will be engaged in psychotherapy as indicated.  Patient will report subjective improvement of symptoms.     Long term goals: To stabilize mood and treat/improve subjective symptoms, the patient will stay out of the hospital, the patient will be at an optimal level of functioning, and the patient will take all medications as prescribed.     Mikhail reviewed and is appropriate.    Copied text in this note has been reviewed and is accurate as of 2/5/2025.    Part of this note may be an electronic transcription/translation of spoken language to printed text using the Dragon Dictation System.    Khadra Gandhi, DNP, APRN, PMHNP-BC

## 2025-02-26 ENCOUNTER — OFFICE VISIT (OUTPATIENT)
Age: 30
End: 2025-02-26
Payer: COMMERCIAL

## 2025-02-26 VITALS
BODY MASS INDEX: 20.99 KG/M2 | WEIGHT: 126 LBS | HEIGHT: 65 IN | HEART RATE: 90 BPM | OXYGEN SATURATION: 96 % | SYSTOLIC BLOOD PRESSURE: 111 MMHG | DIASTOLIC BLOOD PRESSURE: 76 MMHG

## 2025-02-26 DIAGNOSIS — G47.00 INSOMNIA, UNSPECIFIED TYPE: ICD-10-CM

## 2025-02-26 DIAGNOSIS — F25.9 SCHIZOAFFECTIVE DISORDER, UNSPECIFIED TYPE: Primary | ICD-10-CM

## 2025-02-26 DIAGNOSIS — F42.2 MIXED OBSESSIONAL THOUGHTS AND ACTS: ICD-10-CM

## 2025-02-26 RX ORDER — LUMATEPERONE 42 MG/1
1 CAPSULE ORAL
Qty: 30 CAPSULE | Refills: 2 | Status: SHIPPED | OUTPATIENT
Start: 2025-02-26

## 2025-02-26 RX ORDER — LUMATEPERONE 42 MG/1
1 CAPSULE ORAL DAILY
Qty: 21 CAPSULE | Refills: 0 | COMMUNITY
Start: 2025-02-26 | End: 2025-03-19

## 2025-02-26 NOTE — PROGRESS NOTES
"     Office  Follow Up Visit      Patient Name: Deann Coon  : 1995   MRN: [id number]     Referring Provider: Fawn Peraza APRN    Chief Complaint: \"Management of schizoaffective disorder, insomnia, and OCD\"      ICD-10-CM ICD-9-CM   1. Schizoaffective disorder, unspecified type  F25.9 295.70   2. Insomnia, unspecified type  G47.00 780.52   3. Mixed obsessional thoughts and acts  F42.2 300.3        History of Present Illness:   Deann Coon is a 29 y.o. female presenting for a routine follow up appointment for psychiatric medication management.        The patient presents for a tetanus shot.    She reports an overall improvement in her condition, with a notable increase in sleep duration to approximately 12 hours per night. She has been experiencing dreams, which she perceives as a positive sign. Her emotional state has also shown improvement, although she expresses some anxiety due to a recent lapse in her Caplyta medication due to pharmacy delay on refill. Despite this, she continues to take olanzapine and uses hydroxyzine as needed. She reports a decrease in obsessive thoughts and behaviors, such as avoiding drinking water at home. She does not endorse any suicidal or homicidal ideation. She occasionally experiences auditory and visual hallucinations, but these instances have significantly decreased. She reports that her work is going well.    MEDICATIONS  Current: Olanzapine, hydroxyzine, Caplyta.     Subjective      Review of Systems:   Ears, Nose, Mouth, & Throat: Denies difficulty hearing, smelling, sinus problems, sore throat, or dentition.  Heart/Vascular: Denies rapid heart rate, chest pain, swelling of feet or legs   Respiratory: Denies shortness of breath, cough, or wheeze  GI/: Denies nausea, vomiting, constipation, diarrhea, abdominal pain, painful urination, frequent urination  MSK: Denies swelling or joint deformities  Skin: Denies lesions or rash  Neurologic: Denies " headaches, dizziness, or tremor   Endocrine: Denies heat or cold intolerance   Hematologic: Denies easy bruising or bleeding  Psychiatric: Denies insomnia, irritability, depression, anxiety, recurrent bad thoughts, mood swings, hallucinations, compulsions    PHQ-9 Depression Screening  Little interest or pleasure in doing things?     Feeling down, depressed, or hopeless?     PHQ-2 Total Score     Trouble falling or staying asleep, or sleeping too much?     Feeling tired or having little energy?     Poor appetite or overeating?     Feeling bad about yourself - or that you are a failure or have let yourself or your family down?     Trouble concentrating on things, such as reading the newspaper or watching television?     Moving or speaking so slowly that other people could have noticed? Or the opposite - being so fidgety or restless that you have been moving around a lot more than usual?     Thoughts that you would be better off dead, or of hurting yourself in some way?     PHQ-9 Total Score     If you checked off any problems, how difficult have these problems made it for you to do your work, take care of things at home, or get along with other people?           SILVIA-7           Patient History:   The following portions of the patient's history were reviewed and updated as appropriate: allergies, current medications, past family history, past medical history, past social history, past surgical history and problem list.     Social History     Socioeconomic History    Marital status: Single   Tobacco Use    Smoking status: Never    Smokeless tobacco: Never   Vaping Use    Vaping status: Never Used   Substance and Sexual Activity    Alcohol use: Yes     Comment: sometimes    Drug use: Not Currently     Types: Marijuana    Sexual activity: Defer       No family history on file.    Medications:     Current Outpatient Medications:     Lumateperone Tosylate (Caplyta) 42 MG capsule, Take 1 capsule by mouth every night at  "bedtime., Disp: 30 capsule, Rfl: 2    hydrOXYzine (ATARAX) 25 MG tablet, Take 2 tablets by mouth 3 (Three) Times a Day As Needed for Itching. Take 1-2 tablets by mouth 3 times per day as needed for anxiety., Disp: 30 tablet, Rfl: 2    Lumateperone Tosylate (Caplyta) 42 MG capsule, Take 1 capsule by mouth Daily for 21 days. Lot #[id number] x1 Lot #[id number] x2, Disp: 21 capsule, Rfl: 0    multivitamin with minerals tablet tablet, Take 1 tablet by mouth Daily., Disp: 120 tablet, Rfl: 2    OLANZapine (ZyPREXA) 5 MG tablet, Take 1 1/2 tablet (7.5 mg) every night., Disp: 30 tablet, Rfl: 1    polyethylene glycol (MIRALAX) 17 GM/SCOOP powder, Take 17 g (1 CAPFUL) by mouth Daily As Needed (constipation)., Disp: 238 g, Rfl: 0    traZODone (DESYREL) 50 MG tablet, Take 1 tablet by mouth Every Night., Disp: 30 tablet, Rfl: 0    Objective     Physical Exam:  Vital Signs:   Vitals:    02/26/25 0845   BP: 111/76   Pulse: 90   SpO2: 96%   Weight: 57.2 kg (126 lb)   Height: 165.1 cm (65\")     Body mass index is 20.97 kg/m².     Mental Status Exam:   The patient was alert and oriented to time, place, person, and situation. Neatly groomed and dressed. Good eye contact. Cooperative and answers questions willingly. No evidence of gait imbalance or shuffling. Normal speech rate, rhythm, and tone. Reports good mood. Affect congruent with mood. Denies SI/HI/AVH. Organized thought process. No evidence of delusional thinking.  Fair insight, fair judgement. Impulse control intact. Memory intact with average to above average intellectual functioning.     No Known Allergies     Current Medications:   Current Outpatient Medications   Medication Sig Dispense Refill    Lumateperone Tosylate (Caplyta) 42 MG capsule Take 1 capsule by mouth every night at bedtime. 30 capsule 2    hydrOXYzine (ATARAX) 25 MG tablet Take 2 tablets by mouth 3 (Three) Times a Day As Needed for Itching. Take 1-2 tablets by mouth 3 times per day as needed for anxiety. 30 tablet 2    " "Lumateperone Tosylate (Caplyta) 42 MG capsule Take 1 capsule by mouth Daily for 21 days. Lot #[id number] x1  Lot #[id number] x2 21 capsule 0    multivitamin with minerals tablet tablet Take 1 tablet by mouth Daily. 120 tablet 2    OLANZapine (ZyPREXA) 5 MG tablet Take 1 1/2 tablet (7.5 mg) every night. 30 tablet 1    polyethylene glycol (MIRALAX) 17 GM/SCOOP powder Take 17 g (1 CAPFUL) by mouth Daily As Needed (constipation). 238 g 0    traZODone (DESYREL) 50 MG tablet Take 1 tablet by mouth Every Night. 30 tablet 0     No current facility-administered medications for this visit.       @RESULASTCBCDIFFPANEL,TSH,LABLIPI,[id number],EVLTXUWY86,MG,FOLATE,PROLACTIN,CRPRESULT,CMP,[id number])@    Lab Results   Component Value Date    GLUCOSE 141 (H) 09/24/2024    BUN 12 09/24/2024    CREATININE 0.82 09/24/2024    EGFR 98.8 09/24/2024    BCR 14.6 09/24/2024    K 3.7 09/24/2024    CO2 25.3 09/24/2024    CALCIUM 9.8 09/24/2024    ALBUMIN 4.6 09/18/2024    BILITOT 0.4 09/18/2024    AST 19 09/18/2024    ALT 12 09/18/2024       Lab Results   Component Value Date    WBC 4.85 09/24/2024    HGB 14.8 09/24/2024    HCT 43.6 09/24/2024    MCV 89.7 09/24/2024     09/24/2024       No results found for: \"CHOL\", \"CHLPL\", \"TRIG\", \"HDL\", \"LDL\"               Assessment / Plan      Impression/Treatment Plan:       1. Medication management.  She has run out of Caplyta and is awaiting prior authorization from [employer]. A reorder for Caplyta 42 mg daily will be placed, and samples will be provided.    2. Schizoaffective disorder, OCD, and insomnia.  She reports improvement in her symptoms, including better sleep and reduced obsessive thoughts. She continues to take olanzapine 5 mg at night and uses hydroxyzine as needed. She reports no thoughts of self-harm or harm to others and a reduction in auditory and visual hallucinations.  She has been on this medication regimen (Caplyta, olanzapine, and hydroxyzine) since last hospitalization and it has " managed symptoms well.    Follow up in one month.       Diagnoses and all orders for this visit:    1. Schizoaffective disorder, unspecified type (Primary)  -     Lumateperone Tosylate (Caplyta) 42 MG capsule; Take 1 capsule by mouth every night at bedtime.  Dispense: 30 capsule; Refill: 2    2. Insomnia, unspecified type  -     Lumateperone Tosylate (Caplyta) 42 MG capsule; Take 1 capsule by mouth every night at bedtime.  Dispense: 30 capsule; Refill: 2    3. Mixed obsessional thoughts and acts  -     Lumateperone Tosylate (Caplyta) 42 MG capsule; Take 1 capsule by mouth every night at bedtime.  Dispense: 30 capsule; Refill: 2    Other orders  -     Lumateperone Tosylate (Caplyta) 42 MG capsule; Take 1 capsule by mouth Daily for 21 days. Lot #[id number] x1  Lot #[id number] x2  Dispense: 21 capsule; Refill: 0        MEDS ORDERED DURING VISIT:  New Medications Ordered This Visit   Medications    Lumateperone Tosylate (Caplyta) 42 MG capsule     Sig: Take 1 capsule by mouth every night at bedtime.     Dispense:  30 capsule     Refill:  2    Lumateperone Tosylate (Caplyta) 42 MG capsule     Sig: Take 1 capsule by mouth Daily for 21 days. Lot #[id number] x1  Lot #[id number] x2     Dispense:  21 capsule     Refill:  0       Follow up with Khadra Gandhi, KHUSHBU, APRN, PMHNP-BC in this office in one month.          PATIENT EDUCATION:  Discussed medication options and treatment plan of prescribed medication(s) as well as the risks, benefits, and potential side effects. Patient is agreeable to call the office with any worsening of symptoms or onset of side effects. Patient is agreeable to call 911 or go to the nearest ER should he/she begin having SI/HI.    SAFETY PLAN:  Patient was given ample time for questions and fully participated in treatment planning.  Patient was encouraged to call the clinic with any questions or concerns.  Patient was informed of access to emergency care. If patient were to develop any significant symptomatology,  suicidal ideation, homicidal ideation, any concerns, or feel unsafe at any time they are to call the clinic and if unable to get immediate assistance should immediately call 911 or go to the nearest emergency room.  The patient is advised to remove or secure (lock away) all lethal weapons (including guns) and sharps (including razors, scissors, knives, etc.).  All medications (including any prescribed and any over the counter medications) should be stored in a safe and secured location that is not obtainable by children/adolescents.  Patient was given an opportunity and encouraged to ask questions about their medication, illness, and treatment. Patient contracted verbally for the following: If you are experiencing an emotional crisis or have thoughts of harming yourself or others, please go to your nearest local emergency room or call 911. Will continue to re-assess medication response and side effects frequently to establish efficacy and ensure safety. Risks, any black box warnings, side effects, off label usage, and benefits of medication and treatment discussed with patient, along with potential adverse side effects of current and/or newly prescribed medication, alternative treatment options, and OTC medications.  Patient verbalized understanding of potential risks, any off label use of medication, any black box warnings, and any side effects in their own words. The patient verbalized understanding and agreed to comply with the safety plan discussed in their own words.  Patient given the number to the office. Number also available to the 24- hour suicide hotline.     Short Term Goals: Continue building rapport with the patient. Patient will be compliant with medication, and patient will have no significant medication related side effects.  Patient will be engaged in psychotherapy as indicated.  Patient will report subjective improvement of symptoms.     Long term goals: To stabilize mood and treat/improve  subjective symptoms, the patient will stay out of the hospital, the patient will be at an optimal level of functioning, and the patient will take all medications as prescribed.     Mikhail reviewed and is appropriate.    Patient or patient representative verbalized consent for the use of Ambient Listening during the visit with  IGGY Chou for chart documentation. 2/26/2025  08:48 EST       Copied text in this note has been reviewed and is accurate as of 2/26/2025.    Part of this note may be an electronic transcription/translation of spoken language to printed text using the Dragon Dictation System.    Khadra Gandhi DNP, IGGY, PMHNP-BC

## 2025-03-04 ENCOUNTER — TELEPHONE (OUTPATIENT)
Age: 30
End: 2025-03-04
Payer: COMMERCIAL

## 2025-03-04 NOTE — TELEPHONE ENCOUNTER
Patient is calling to make you aware that she is having problems.    She says that she is feeling like it is chemicals in the air and in the food.    She is thinking that the house that she is staying at is causing her problems.    Please call and advise.

## 2025-03-05 DIAGNOSIS — G47.00 INSOMNIA, UNSPECIFIED TYPE: Primary | ICD-10-CM

## 2025-03-05 DIAGNOSIS — F25.9 SCHIZOAFFECTIVE DISORDER, UNSPECIFIED TYPE: ICD-10-CM

## 2025-03-05 DIAGNOSIS — F42.2 MIXED OBSESSIONAL THOUGHTS AND ACTS: ICD-10-CM

## 2025-03-05 NOTE — TELEPHONE ENCOUNTER
Spoke to patient on the phone regarding concern about fear of chemicals. She reports that she was not able to get her last prescription filled for olanzapine. She is currently taking Caplyta 42 mg daily. We discussed a second trial of Vraylar 1.5 mg at night with the plan to dose every other day if/when increasing to 3 mg due to previous akathisia. The patient was instructed to discontinue Caplyta and start Vraylar 1.5 mg at night. Olanzapine has been discontinued. The patient will follow up with this provider in one week. The patient verbalizes understanding and is in agreement with this plan.

## 2025-03-13 ENCOUNTER — OFFICE VISIT (OUTPATIENT)
Age: 30
End: 2025-03-13
Payer: COMMERCIAL

## 2025-03-13 VITALS
SYSTOLIC BLOOD PRESSURE: 105 MMHG | DIASTOLIC BLOOD PRESSURE: 68 MMHG | WEIGHT: 125 LBS | HEART RATE: 92 BPM | BODY MASS INDEX: 20.83 KG/M2 | OXYGEN SATURATION: 96 % | HEIGHT: 65 IN

## 2025-03-13 DIAGNOSIS — F42.2 MIXED OBSESSIONAL THOUGHTS AND ACTS: ICD-10-CM

## 2025-03-13 DIAGNOSIS — F25.9 SCHIZOAFFECTIVE DISORDER, UNSPECIFIED TYPE: Primary | ICD-10-CM

## 2025-03-13 DIAGNOSIS — G47.00 INSOMNIA, UNSPECIFIED TYPE: ICD-10-CM

## 2025-03-13 NOTE — PROGRESS NOTES
"     Office  Follow Up Visit      Patient Name: Deann Coon  : 1995   MRN: [id number]     Referring Provider: Fawn Peraza APRN    Chief Complaint:  \"management of schizoaffective disorder, OCD, and insomnia\"      ICD-10-CM ICD-9-CM   1. Schizoaffective disorder, unspecified type  F25.9 295.70   2. Mixed obsessional thoughts and acts  F42.2 300.3   3. Insomnia, unspecified type  G47.00 780.52        History of Present Illness:   Deann Coon is a 29 y.o. female presenting for a routine follow up appointment for psychiatric medication management.        She reports no discernible improvement in her condition following the reintroduction of Vraylar.  She does not experience restlessness but acknowledges occasional anxiety flare-ups. Her sleep pattern is irregular, approximately 6 hours per night, primarily due to anxiety. On a good night, she stays in bed longer than 6 hours. She describes experiencing intrusive thoughts, which she perceives as decision-making contingent upon fear of chemicals. She has been adhering to a daily regimen of Vraylar 1.5 mg for the past 2 weeks. She has recently discontinued the use of hydroxyzine, which she believes was beneficial in managing her anxiety.    MEDICATIONS  Current: Vraylar, Hydroxyzine.  Past: Fluvoxamine.    Subjective      Review of Systems:   Ears, Nose, Mouth, & Throat: Denies difficulty hearing, smelling, sinus problems, sore throat, or dentition.  Heart/Vascular: Denies rapid heart rate, chest pain, swelling of feet or legs   Respiratory: Denies shortness of breath, cough, or wheeze  GI/: Denies nausea, vomiting, constipation, diarrhea, abdominal pain, painful urination, frequent urination  MSK: Denies swelling or joint deformities  Skin: Denies lesions or rash  Neurologic: Denies headaches, dizziness, or tremor   Endocrine: Denies heat or cold intolerance   Hematologic: Denies easy bruising or bleeding  Psychiatric: Endorses insomnia, " irritability, depression, anxiety, recurrent bad thoughts; denies mood swings, hallucinations, compulsions    PHQ-9 Depression Screening  Little interest or pleasure in doing things? Several days   Feeling down, depressed, or hopeless? Several days   PHQ-2 Total Score 2   Trouble falling or staying asleep, or sleeping too much? More than half the days   Feeling tired or having little energy? More than half the days   Poor appetite or overeating? Not at all   Feeling bad about yourself - or that you are a failure or have let yourself or your family down? More than half the days   Trouble concentrating on things, such as reading the newspaper or watching television? More than half the days   Moving or speaking so slowly that other people could have noticed? Or the opposite - being so fidgety or restless that you have been moving around a lot more than usual? Not at all     Thoughts that you would be better off dead, or of hurting yourself in some way? Not at all   PHQ-9 Total Score 10   If you checked off any problems, how difficult have these problems made it for you to do your work, take care of things at home, or get along with other people? Somewhat difficult         SILVIA-7      Over the last two weeks, how often have you been bothered by the following problems?  Feeling nervous, anxious or on edge: More than half the days  Not being able to stop or control worrying: Nearly every day  Worrying too much about different things: More than half the days  Trouble Relaxing: More than half the days  Being so restless that it is hard to sit still: Several days  Becoming easily annoyed or irritable: More than half the days  Feeling afraid as if something awful might happen: Nearly every day  SILVIA 7 Total Score: 15  If you checked any problems, how difficult have these problems made it for you to do your work, take care of things at home, or get along with other people: Extremely difficult    Patient History:   The following  "portions of the patient's history were reviewed and updated as appropriate: allergies, current medications, past family history, past medical history, past social history, past surgical history and problem list.     Social History     Socioeconomic History    Marital status: Single   Tobacco Use    Smoking status: Never    Smokeless tobacco: Never   Vaping Use    Vaping status: Never Used   Substance and Sexual Activity    Alcohol use: Yes     Comment: sometimes    Drug use: Not Currently     Types: Marijuana    Sexual activity: Defer       History reviewed. No pertinent family history.    Medications:     Current Outpatient Medications:     Cariprazine HCl (Vraylar) 1.5 MG capsule capsule, Take 1 capsule by mouth Daily for 21 days., Disp: 21 capsule, Rfl: 0    hydrOXYzine (ATARAX) 25 MG tablet, Take 2 tablets by mouth 3 (Three) Times a Day As Needed for Itching. Take 1-2 tablets by mouth 3 times per day as needed for anxiety., Disp: 30 tablet, Rfl: 2    multivitamin with minerals tablet tablet, Take 1 tablet by mouth Daily., Disp: 120 tablet, Rfl: 2    polyethylene glycol (MIRALAX) 17 GM/SCOOP powder, Take 17 g (1 CAPFUL) by mouth Daily As Needed (constipation)., Disp: 238 g, Rfl: 0    Objective     Physical Exam:  Vital Signs:   Vitals:    03/13/25 0823   BP: 105/68   Pulse: 92   SpO2: 96%   Weight: 56.7 kg (125 lb)   Height: 165.1 cm (65\")     Body mass index is 20.8 kg/m².     Mental Status Exam:   The patient was alert and oriented to time, place, person, and situation. Neatly groomed and dressed. Good eye contact. Cooperative and answers questions willingly. No evidence of gait imbalance or shuffling. Normal speech rate, rhythm, and tone. Reports anxious mood. Affect congruent with mood. Denies SI/HI/AVH. Organized thought process. No evidence of delusional thinking. Fair insight, fair judgement. Impulse control intact. Memory intact with average to above average intellectual functioning.     No Known " "Allergies     Current Medications:   Current Outpatient Medications   Medication Sig Dispense Refill    Cariprazine HCl (Vraylar) 1.5 MG capsule capsule Take 1 capsule by mouth Daily for 21 days. 21 capsule 0    hydrOXYzine (ATARAX) 25 MG tablet Take 2 tablets by mouth 3 (Three) Times a Day As Needed for Itching. Take 1-2 tablets by mouth 3 times per day as needed for anxiety. 30 tablet 2    multivitamin with minerals tablet tablet Take 1 tablet by mouth Daily. 120 tablet 2    polyethylene glycol (MIRALAX) 17 GM/SCOOP powder Take 17 g (1 CAPFUL) by mouth Daily As Needed (constipation). 238 g 0     No current facility-administered medications for this visit.       @RESULASTCBCDIFFPANEL,TSH,LABLIPI,[id number],UOOLWYTN65,MG,FOLATE,PROLACTIN,CRPRESULT,CMP,[id number])@    Lab Results   Component Value Date    GLUCOSE 141 (H) 09/24/2024    BUN 12 09/24/2024    CREATININE 0.82 09/24/2024    EGFR 98.8 09/24/2024    BCR 14.6 09/24/2024    K 3.7 09/24/2024    CO2 25.3 09/24/2024    CALCIUM 9.8 09/24/2024    ALBUMIN 4.6 09/18/2024    BILITOT 0.4 09/18/2024    AST 19 09/18/2024    ALT 12 09/18/2024       Lab Results   Component Value Date    WBC 4.85 09/24/2024    HGB 14.8 09/24/2024    HCT 43.6 09/24/2024    MCV 89.7 09/24/2024     09/24/2024       No results found for: \"CHOL\", \"CHLPL\", \"TRIG\", \"HDL\", \"LDL\"               Assessment / Plan      Impression/Treatment Plan:       1. Schizoaffective disorder, insomnia, Obsessive-Compulsive Disorder (OCD).  She reports no significant improvement in symptoms since switching back to Vraylar. She is currently taking Vraylar every day. She experiences anxiety flare-ups and intrusive thoughts, which affect her daily life. She would like remain at the 1.5 mg dose due to previous akathisia at higher doses of Vraylar. She is advised to continue taking hydroxyzine at night to aid with sleep. If there is no improvement in symptom control, consideration will be given to increasing " Vraylar to 3 mg and dosing every other day for side effect mitigation  .  Follow-up  The patient will follow up in 2 weeks.       Diagnoses and all orders for this visit:    1. Schizoaffective disorder, unspecified type (Primary)    2. Mixed obsessional thoughts and acts    3. Insomnia, unspecified type        MEDS ORDERED DURING VISIT:  No orders of the defined types were placed in this encounter.      Follow up with Khadra Gandhi, DNP, APRN, PMHNP-BC in this office in 2 weeks.          PATIENT EDUCATION:  Discussed medication options and treatment plan of prescribed medication(s) as well as the risks, benefits, and potential side effects. Patient is agreeable to call the office with any worsening of symptoms or onset of side effects. Patient is agreeable to call 911 or go to the nearest ER should he/she begin having SI/HI.    SAFETY PLAN:  Patient was given ample time for questions and fully participated in treatment planning.  Patient was encouraged to call the clinic with any questions or concerns.  Patient was informed of access to emergency care. If patient were to develop any significant symptomatology, suicidal ideation, homicidal ideation, any concerns, or feel unsafe at any time they are to call the clinic and if unable to get immediate assistance should immediately call 911 or go to the nearest emergency room.  The patient is advised to remove or secure (lock away) all lethal weapons (including guns) and sharps (including razors, scissors, knives, etc.).  All medications (including any prescribed and any over the counter medications) should be stored in a safe and secured location that is not obtainable by children/adolescents.  Patient was given an opportunity and encouraged to ask questions about their medication, illness, and treatment. Patient contracted verbally for the following: If you are experiencing an emotional crisis or have thoughts of harming yourself or others, please go to your  nearest local emergency room or call 911. Will continue to re-assess medication response and side effects frequently to establish efficacy and ensure safety. Risks, any black box warnings, side effects, off label usage, and benefits of medication and treatment discussed with patient, along with potential adverse side effects of current and/or newly prescribed medication, alternative treatment options, and OTC medications.  Patient verbalized understanding of potential risks, any off label use of medication, any black box warnings, and any side effects in their own words. The patient verbalized understanding and agreed to comply with the safety plan discussed in their own words.  Patient given the number to the office. Number also available to the 24- hour suicide hotline.     Short Term Goals: Continue building rapport with the patient. Patient will be compliant with medication, and patient will have no significant medication related side effects.  Patient will be engaged in psychotherapy as indicated.  Patient will report subjective improvement of symptoms.     Long term goals: To stabilize mood and treat/improve subjective symptoms, the patient will stay out of the hospital, the patient will be at an optimal level of functioning, and the patient will take all medications as prescribed.     Mikhail reviewed and is appropriate.    Patient or patient representative verbalized consent for the use of Ambient Listening during the visit with  IGGY Chou for chart documentation. 3/13/2025  08:55 EDT       Copied text in this note has been reviewed and is accurate as of 3/13/2025.    Part of this note may be an electronic transcription/translation of spoken language to printed text using the Dragon Dictation System.    Khadra Gandhi DNP, IGGY, PMHNP-BC

## 2025-03-27 ENCOUNTER — OFFICE VISIT (OUTPATIENT)
Age: 30
End: 2025-03-27
Payer: COMMERCIAL

## 2025-03-27 VITALS
HEART RATE: 88 BPM | HEIGHT: 65 IN | DIASTOLIC BLOOD PRESSURE: 63 MMHG | WEIGHT: 125 LBS | SYSTOLIC BLOOD PRESSURE: 91 MMHG | OXYGEN SATURATION: 96 % | BODY MASS INDEX: 20.83 KG/M2

## 2025-03-27 DIAGNOSIS — F25.9 SCHIZOAFFECTIVE DISORDER, UNSPECIFIED TYPE: Primary | ICD-10-CM

## 2025-03-27 DIAGNOSIS — F42.2 MIXED OBSESSIONAL THOUGHTS AND ACTS: ICD-10-CM

## 2025-03-27 DIAGNOSIS — G47.00 INSOMNIA, UNSPECIFIED TYPE: ICD-10-CM

## 2025-03-27 NOTE — PROGRESS NOTES
"     Office  Follow Up Visit      Patient Name: Deann Coon  : 1995   MRN: [id number]     Referring Provider: Fawn Peraza APRN    Chief Complaint:  \"management of schizoaffective disorder, OCD, and insomnia\"      ICD-10-CM ICD-9-CM   1. Schizoaffective disorder, unspecified type  F25.9 295.70   2. Mixed obsessional thoughts and acts  F42.2 300.3   3. Insomnia, unspecified type  G47.00 780.52        History of Present Illness:   Deann Coon is a 29 y.o. female presenting for a routine follow up appointment for psychiatric medication management.        She reports an exacerbation of her OCD symptoms, which have led to a temporary relocation from her home due to fear of performing daily activities such as showering, eating, and brushing her teeth. Her sleep quality has improved slightly, likely due to stress. She continues to experience hallucinations, although they have decreased in frequency. She does not endorse any suicidal or homicidal ideations. She feels safe in her current living situation and maintains regular contact with her parents. She is currently on a regimen of Vraylar 1.5 mg, taken every other day to mitigate akathisia, and has discontinued hydroxyzine. She reports no inner restlessness since dosing every other day.     MEDICATIONS  Current: Vraylar  Discontinued: hydroxyzine, lamotrigine  Past: olanzapine    Subjective      Review of Systems:   Ears, Nose, Mouth, & Throat: Denies difficulty hearing, smelling, sinus problems, sore throat, or dentition.  Heart/Vascular: Denies rapid heart rate, chest pain, swelling of feet or legs   Respiratory: Denies shortness of breath, cough, or wheeze  GI/: Denies nausea, vomiting, constipation, diarrhea, abdominal pain, painful urination, frequent urination  MSK: Denies swelling or joint deformities  Skin: Denies lesions or rash  Neurologic: Denies headaches, dizziness, or tremor   Endocrine: Denies heat or cold intolerance "   Hematologic: Denies easy bruising or bleeding  Psychiatric: Endorses obsessive/intrusive thoughts, auditory hallucinations, and anxiety; denies insomnia, irritability, depression, mood swings, visual hallucinations, compulsions    PHQ-9 Depression Screening  Little interest or pleasure in doing things? Not at all   Feeling down, depressed, or hopeless? More than half the days   PHQ-2 Total Score 2   Trouble falling or staying asleep, or sleeping too much? Several days   Feeling tired or having little energy? Several days   Poor appetite or overeating? Not at all   Feeling bad about yourself - or that you are a failure or have let yourself or your family down? More than half the days   Trouble concentrating on things, such as reading the newspaper or watching television? More than half the days   Moving or speaking so slowly that other people could have noticed? Or the opposite - being so fidgety or restless that you have been moving around a lot more than usual? Not at all     Thoughts that you would be better off dead, or of hurting yourself in some way? Not at all   PHQ-9 Total Score 8   If you checked off any problems, how difficult have these problems made it for you to do your work, take care of things at home, or get along with other people? Somewhat difficult         SILVIA-7      Over the last two weeks, how often have you been bothered by the following problems?  Feeling nervous, anxious or on edge: Nearly every day  Not being able to stop or control worrying: Nearly every day  Worrying too much about different things: Nearly every day  Trouble Relaxing: Nearly every day  Being so restless that it is hard to sit still: Nearly every day  Becoming easily annoyed or irritable: Not at all  Feeling afraid as if something awful might happen: Nearly every day  SILVIA 7 Total Score: 18  If you checked any problems, how difficult have these problems made it for you to do your work, take care of things at home, or get  "along with other people: Extremely difficult    Patient History:   The following portions of the patient's history were reviewed and updated as appropriate: allergies, current medications, past family history, past medical history, past social history, past surgical history and problem list.     Social History     Socioeconomic History    Marital status: Single   Tobacco Use    Smoking status: Never    Smokeless tobacco: Never   Vaping Use    Vaping status: Never Used   Substance and Sexual Activity    Alcohol use: Yes     Comment: sometimes    Drug use: Not Currently     Types: Marijuana    Sexual activity: Defer       No family history on file.    Medications:     Current Outpatient Medications:     Cariprazine HCl (VRAYLAR) 3 MG capsule capsule, Take 1 capsule by mouth Daily for 90 days., Disp: 30 capsule, Rfl: 2    hydrOXYzine (ATARAX) 25 MG tablet, Take 2 tablets by mouth 3 (Three) Times a Day As Needed for Itching. Take 1-2 tablets by mouth 3 times per day as needed for anxiety., Disp: 30 tablet, Rfl: 2    multivitamin with minerals tablet tablet, Take 1 tablet by mouth Daily., Disp: 120 tablet, Rfl: 2    polyethylene glycol (MIRALAX) 17 GM/SCOOP powder, Take 17 g (1 CAPFUL) by mouth Daily As Needed (constipation)., Disp: 238 g, Rfl: 0    Objective     Physical Exam:  Vital Signs:   Vitals:    03/27/25 0938   BP: 91/63   Pulse: 88   SpO2: 96%   Weight: 56.7 kg (125 lb)   Height: 165.1 cm (65\")     Body mass index is 20.8 kg/m².     Mental Status Exam:   The patient was alert and oriented to time, place, person, and situation. Neatly groomed and dressed. Good eye contact. Cooperative and answers questions willingly. No evidence of gait imbalance or shuffling. Normal speech rate, rhythm, and tone. Reports anxious mood. Affect congruent with mood. Denies SI/HI/VH. Endorses AH. Organized thought process. No evidence of delusional thinking. Fair insight, fair judgement. Impulse control intact. Memory intact with " "average to above average intellectual functioning.     No Known Allergies     Current Medications:   Current Outpatient Medications   Medication Sig Dispense Refill    Cariprazine HCl (VRAYLAR) 3 MG capsule capsule Take 1 capsule by mouth Daily for 90 days. 30 capsule 2    hydrOXYzine (ATARAX) 25 MG tablet Take 2 tablets by mouth 3 (Three) Times a Day As Needed for Itching. Take 1-2 tablets by mouth 3 times per day as needed for anxiety. 30 tablet 2    multivitamin with minerals tablet tablet Take 1 tablet by mouth Daily. 120 tablet 2    polyethylene glycol (MIRALAX) 17 GM/SCOOP powder Take 17 g (1 CAPFUL) by mouth Daily As Needed (constipation). 238 g 0     No current facility-administered medications for this visit.       @RESULASTCBCDIFFPANEL,TSH,LABLIPI,[id number],EFHYVLLX17,MG,FOLATE,PROLACTIN,CRPRESULT,CMP,[id number])@    Lab Results   Component Value Date    GLUCOSE 141 (H) 09/24/2024    BUN 12 09/24/2024    CREATININE 0.82 09/24/2024    EGFR 98.8 09/24/2024    BCR 14.6 09/24/2024    K 3.7 09/24/2024    CO2 25.3 09/24/2024    CALCIUM 9.8 09/24/2024    ALBUMIN 4.6 09/18/2024    BILITOT 0.4 09/18/2024    AST 19 09/18/2024    ALT 12 09/18/2024       Lab Results   Component Value Date    WBC 4.85 09/24/2024    HGB 14.8 09/24/2024    HCT 43.6 09/24/2024    MCV 89.7 09/24/2024     09/24/2024       No results found for: \"CHOL\", \"CHLPL\", \"TRIG\", \"HDL\", \"LDL\"               Assessment / Plan      Impression/Treatment Plan:       1. Obsessive-Compulsive Disorder.  Her symptoms appear to be a manifestation of both OCD and bipolar disorder. The potential introduction of Luvox was discussed, but there is a concern that it may exacerbate manic episodes.  A prescription for Vraylar 3 mg will be provided, increased from 1.5 mg every other day. If she experiences inner restlessness with the increased dosage, she should inform provider prior to her next appointment.    2. Schizoaffective disorder, bipolar type.  She is " advised to monitor for any increase in manic symptoms and to report any issues promptly. The current plan is to manage her symptoms with Vraylar 3 mg every other day.    Follow-up  The patient will follow up in 2 weeks or sooner as needed.       Diagnoses and all orders for this visit:    1. Schizoaffective disorder, unspecified type (Primary)  -     Cariprazine HCl (VRAYLAR) 3 MG capsule capsule; Take 1 capsule by mouth Daily for 90 days.  Dispense: 30 capsule; Refill: 2    2. Mixed obsessional thoughts and acts  -     Cariprazine HCl (VRAYLAR) 3 MG capsule capsule; Take 1 capsule by mouth Daily for 90 days.  Dispense: 30 capsule; Refill: 2    3. Insomnia, unspecified type  -     Cariprazine HCl (VRAYLAR) 3 MG capsule capsule; Take 1 capsule by mouth Daily for 90 days.  Dispense: 30 capsule; Refill: 2        MEDS ORDERED DURING VISIT:  New Medications Ordered This Visit   Medications    Cariprazine HCl (VRAYLAR) 3 MG capsule capsule     Sig: Take 1 capsule by mouth Daily for 90 days.     Dispense:  30 capsule     Refill:  2       PATIENT EDUCATION:  Discussed medication options and treatment plan of prescribed medication(s) as well as the risks, benefits, and potential side effects. Patient is agreeable to call the office with any worsening of symptoms or onset of side effects. Patient is agreeable to call 911 or go to the nearest ER should he/she begin having SI/HI.    SAFETY PLAN:  Patient was given ample time for questions and fully participated in treatment planning.  Patient was encouraged to call the clinic with any questions or concerns.  Patient was informed of access to emergency care. If patient were to develop any significant symptomatology, suicidal ideation, homicidal ideation, any concerns, or feel unsafe at any time they are to call the clinic and if unable to get immediate assistance should immediately call 911 or go to the nearest emergency room.  The patient is advised to remove or secure (lock  away) all lethal weapons (including guns) and sharps (including razors, scissors, knives, etc.).  All medications (including any prescribed and any over the counter medications) should be stored in a safe and secured location that is not obtainable by children/adolescents.  Patient was given an opportunity and encouraged to ask questions about their medication, illness, and treatment. Patient contracted verbally for the following: If you are experiencing an emotional crisis or have thoughts of harming yourself or others, please go to your nearest local emergency room or call 911. Will continue to re-assess medication response and side effects frequently to establish efficacy and ensure safety. Risks, any black box warnings, side effects, off label usage, and benefits of medication and treatment discussed with patient, along with potential adverse side effects of current and/or newly prescribed medication, alternative treatment options, and OTC medications.  Patient verbalized understanding of potential risks, any off label use of medication, any black box warnings, and any side effects in their own words. The patient verbalized understanding and agreed to comply with the safety plan discussed in their own words.  Patient given the number to the office. Number also available to the 24- hour suicide hotline.     Short Term Goals: Continue building rapport with the patient. Patient will be compliant with medication, and patient will have no significant medication related side effects.  Patient will be engaged in psychotherapy as indicated.  Patient will report subjective improvement of symptoms.     Long term goals: To stabilize mood and treat/improve subjective symptoms, the patient will stay out of the hospital, the patient will be at an optimal level of functioning, and the patient will take all medications as prescribed.     Mikhail reviewed and is appropriate.    Patient or patient representative verbalized consent  for the use of Ambient Listening during the visit with  IGGY Chou for chart documentation. 3/27/2025  09:45 EDT       Copied text in this note has been reviewed and is accurate as of 3/27/2025.    Part of this note may be an electronic transcription/translation of spoken language to printed text using the Dragon Dictation System.    Khadra Gandhi DNP, IGGY, PMHNP-BC

## 2025-04-10 ENCOUNTER — OFFICE VISIT (OUTPATIENT)
Age: 30
End: 2025-04-10
Payer: COMMERCIAL

## 2025-04-10 VITALS
SYSTOLIC BLOOD PRESSURE: 97 MMHG | HEIGHT: 65 IN | WEIGHT: 125 LBS | BODY MASS INDEX: 20.83 KG/M2 | HEART RATE: 83 BPM | DIASTOLIC BLOOD PRESSURE: 60 MMHG | OXYGEN SATURATION: 99 %

## 2025-04-10 DIAGNOSIS — G47.00 INSOMNIA, UNSPECIFIED TYPE: ICD-10-CM

## 2025-04-10 DIAGNOSIS — F25.9 SCHIZOAFFECTIVE DISORDER, UNSPECIFIED TYPE: Primary | ICD-10-CM

## 2025-04-10 DIAGNOSIS — F42.2 MIXED OBSESSIONAL THOUGHTS AND ACTS: ICD-10-CM

## 2025-04-10 NOTE — PROGRESS NOTES
"     Office  Follow Up Visit      Patient Name: Deann Coon  : 1995   MRN: [id number]     Referring Provider: Fawn Peraza APRN    Chief Complaint:  \"management of schizoaffective disorder, OCD, and insomnia\"      ICD-10-CM ICD-9-CM   1. Schizoaffective disorder, unspecified type  F25.9 295.70   2. Mixed obsessional thoughts and acts  F42.2 300.3   3. Insomnia, unspecified type  G47.00 780.52        History of Present Illness:   Deann Coon is a 29 y.o. female presenting for a routine follow up appointment for psychiatric medication management.        She reports a significant improvement in her mood stability, attributing this to the regular use of Vraylar. However, she continues to experience anxiety related to chemical exposure. Her depressive symptoms have lessened, but she occasionally experiences feelings of hopelessness. She has discontinued hydroxyzine as it was not effective in managing her anxiety. She is currently on Vraylar 3 mg at night. She reports no suicidal or homicidal ideations. She also notes an improvement in auditory hallucinations, which now occur less frequently, approximately once daily. She manages her OCD symptoms by engaging in activities such as [employer], which distract her from her worries.    MEDICATIONS  Current: Vraylar  Discontinued: hydroxyzine    Subjective      Review of Systems:   Ears, Nose, Mouth, & Throat: Denies difficulty hearing, smelling, sinus problems, sore throat, or dentition.  Heart/Vascular: Denies rapid heart rate, chest pain, swelling of feet or legs   Respiratory: Denies shortness of breath, cough, or wheeze  GI/: Denies nausea, vomiting, constipation, diarrhea, abdominal pain, painful urination, frequent urination  MSK: Denies swelling or joint deformities  Skin: Denies lesions or rash  Neurologic: Denies headaches, dizziness, or tremor   Endocrine: Denies heat or cold intolerance   Hematologic: Denies easy bruising or " bleeding  Psychiatric: Endorses intrusive thoughts, anxiety; improvement in auditory hallucinations and mood/depression; denies insomnia, irritability,  mood swings, visual hallucinations, compulsions    PHQ-9 Depression Screening  Little interest or pleasure in doing things? More than half the days   Feeling down, depressed, or hopeless? Several days   PHQ-2 Total Score 3   Trouble falling or staying asleep, or sleeping too much? Several days   Feeling tired or having little energy? More than half the days   Poor appetite or overeating? Not at all   Feeling bad about yourself - or that you are a failure or have let yourself or your family down? More than half the days   Trouble concentrating on things, such as reading the newspaper or watching television? Several days   Moving or speaking so slowly that other people could have noticed? Or the opposite - being so fidgety or restless that you have been moving around a lot more than usual? Not at all     Thoughts that you would be better off dead, or of hurting yourself in some way? Not at all   PHQ-9 Total Score 9   If you checked off any problems, how difficult have these problems made it for you to do your work, take care of things at home, or get along with other people? Somewhat difficult         SILVIA-7      Over the last two weeks, how often have you been bothered by the following problems?  Feeling nervous, anxious or on edge: Nearly every day  Not being able to stop or control worrying: Nearly every day  Worrying too much about different things: Not at all  Trouble Relaxing: Nearly every day  Being so restless that it is hard to sit still: Not at all  Becoming easily annoyed or irritable: Nearly every day  Feeling afraid as if something awful might happen: Nearly every day  SILVIA 7 Total Score: 15  If you checked any problems, how difficult have these problems made it for you to do your work, take care of things at home, or get along with other people: Very  "difficult    Patient History:   The following portions of the patient's history were reviewed and updated as appropriate: allergies, current medications, past family history, past medical history, past social history, past surgical history and problem list.     Social History     Socioeconomic History    Marital status: Single   Tobacco Use    Smoking status: Never    Smokeless tobacco: Never   Vaping Use    Vaping status: Never Used   Substance and Sexual Activity    Alcohol use: Yes     Comment: sometimes    Drug use: Not Currently     Types: Marijuana    Sexual activity: Defer       History reviewed. No pertinent family history.    Medications:     Current Outpatient Medications:     Cariprazine HCl (Vraylar) 4.5 MG capsule capsule, Take 1 capsule by mouth Daily for 90 days., Disp: 30 capsule, Rfl: 2    hydrOXYzine (ATARAX) 25 MG tablet, Take 2 tablets by mouth 3 (Three) Times a Day As Needed for Itching. Take 1-2 tablets by mouth 3 times per day as needed for anxiety., Disp: 30 tablet, Rfl: 2    multivitamin with minerals tablet tablet, Take 1 tablet by mouth Daily., Disp: 120 tablet, Rfl: 2    polyethylene glycol (MIRALAX) 17 GM/SCOOP powder, Take 17 g (1 CAPFUL) by mouth Daily As Needed (constipation)., Disp: 238 g, Rfl: 0    Objective     Physical Exam:  Vital Signs:   Vitals:    04/10/25 0949   BP: 97/60   Pulse: 83   SpO2: 99%   Weight: 56.7 kg (125 lb)   Height: 165.1 cm (65\")     Body mass index is 20.8 kg/m².     Mental Status Exam:   The patient was alert and oriented to time, place, person, and situation. Neatly groomed and dressed. Good eye contact. Cooperative and answers questions willingly. No evidence of gait imbalance or shuffling. Normal speech rate, rhythm, and tone. Reports anxious mood. Affect congruent with mood. Endorses improvement in auditory hallucinations; denies SI/HI/VH. Organized thought process. No evidence of delusional thinking. Fair insight, fair judgement. Impulse control " "intact. Memory intact with average to above average intellectual functioning.     No Known Allergies     Current Medications:   Current Outpatient Medications   Medication Sig Dispense Refill    Cariprazine HCl (Vraylar) 4.5 MG capsule capsule Take 1 capsule by mouth Daily for 90 days. 30 capsule 2    hydrOXYzine (ATARAX) 25 MG tablet Take 2 tablets by mouth 3 (Three) Times a Day As Needed for Itching. Take 1-2 tablets by mouth 3 times per day as needed for anxiety. 30 tablet 2    multivitamin with minerals tablet tablet Take 1 tablet by mouth Daily. 120 tablet 2    polyethylene glycol (MIRALAX) 17 GM/SCOOP powder Take 17 g (1 CAPFUL) by mouth Daily As Needed (constipation). 238 g 0     No current facility-administered medications for this visit.       @RESULASTCBCDIFFPANEL,TSH,LABLIPI,RLKNJEUZ50,JRRWETIY40,MG,FOLATE,PROLACTIN,CRPRESULT,CMP,[id number])@    Lab Results   Component Value Date    GLUCOSE 141 (H) 09/24/2024    BUN 12 09/24/2024    CREATININE 0.82 09/24/2024    EGFR 98.8 09/24/2024    BCR 14.6 09/24/2024    K 3.7 09/24/2024    CO2 25.3 09/24/2024    CALCIUM 9.8 09/24/2024    ALBUMIN 4.6 09/18/2024    BILITOT 0.4 09/18/2024    AST 19 09/18/2024    ALT 12 09/18/2024       Lab Results   Component Value Date    WBC 4.85 09/24/2024    HGB 14.8 09/24/2024    HCT 43.6 09/24/2024    MCV 89.7 09/24/2024     09/24/2024       No results found for: \"CHOL\", \"CHLPL\", \"TRIG\", \"HDL\", \"LDL\"               Assessment / Plan      Impression/Treatment Plan:       1. Schizoaffective disorder, bipolar type and OCD.  She reports feeling more stable but continues to experience intrusive thoughts and worry about chemicals. The dosage of Vraylar will be increased to 4.5 mg to help manage her thoughts. She is advised to take hydroxyzine 25 mg, 1 to 2 tablets at night, to help with anxiety-related symptoms. Discussed adding lithium in the future if symptom amelioration is not attained with increased Vraylar " dose.    Follow-up  The patient will follow up in 2 weeks or sooner as needed.       Diagnoses and all orders for this visit:    1. Schizoaffective disorder, unspecified type (Primary)  -     Cariprazine HCl (Vraylar) 4.5 MG capsule capsule; Take 1 capsule by mouth Daily for 90 days.  Dispense: 30 capsule; Refill: 2    2. Mixed obsessional thoughts and acts  -     Cariprazine HCl (Vraylar) 4.5 MG capsule capsule; Take 1 capsule by mouth Daily for 90 days.  Dispense: 30 capsule; Refill: 2    3. Insomnia, unspecified type  -     Cariprazine HCl (Vraylar) 4.5 MG capsule capsule; Take 1 capsule by mouth Daily for 90 days.  Dispense: 30 capsule; Refill: 2        MEDS ORDERED DURING VISIT:  New Medications Ordered This Visit   Medications    Cariprazine HCl (Vraylar) 4.5 MG capsule capsule     Sig: Take 1 capsule by mouth Daily for 90 days.     Dispense:  30 capsule     Refill:  2       PATIENT EDUCATION:  Discussed medication options and treatment plan of prescribed medication(s) as well as the risks, benefits, and potential side effects. Patient is agreeable to call the office with any worsening of symptoms or onset of side effects. Patient is agreeable to call 911 or go to the nearest ER should he/she begin having SI/HI.    SAFETY PLAN:  Patient was given ample time for questions and fully participated in treatment planning.  Patient was encouraged to call the clinic with any questions or concerns.  Patient was informed of access to emergency care. If patient were to develop any significant symptomatology, suicidal ideation, homicidal ideation, any concerns, or feel unsafe at any time they are to call the clinic and if unable to get immediate assistance should immediately call 911 or go to the nearest emergency room.  The patient is advised to remove or secure (lock away) all lethal weapons (including guns) and sharps (including razors, scissors, knives, etc.).  All medications (including any prescribed and any over  the counter medications) should be stored in a safe and secured location that is not obtainable by children/adolescents.  Patient was given an opportunity and encouraged to ask questions about their medication, illness, and treatment. Patient contracted verbally for the following: If you are experiencing an emotional crisis or have thoughts of harming yourself or others, please go to your nearest local emergency room or call 911. Will continue to re-assess medication response and side effects frequently to establish efficacy and ensure safety. Risks, any black box warnings, side effects, off label usage, and benefits of medication and treatment discussed with patient, along with potential adverse side effects of current and/or newly prescribed medication, alternative treatment options, and OTC medications.  Patient verbalized understanding of potential risks, any off label use of medication, any black box warnings, and any side effects in their own words. The patient verbalized understanding and agreed to comply with the safety plan discussed in their own words.  Patient given the number to the office. Number also available to the 24- hour suicide hotline.     Short Term Goals: Continue building rapport with the patient. Patient will be compliant with medication, and patient will have no significant medication related side effects.  Patient will be engaged in psychotherapy as indicated.  Patient will report subjective improvement of symptoms.     Long term goals: To stabilize mood and treat/improve subjective symptoms, the patient will stay out of the hospital, the patient will be at an optimal level of functioning, and the patient will take all medications as prescribed.     Mikhail reviewed and is appropriate.    Patient or patient representative verbalized consent for the use of Ambient Listening during the visit with  IGGY Chou for chart documentation. 4/10/2025  12:03 EDT       Copied text in  this note has been reviewed and is accurate as of 4/10/2025.    Part of this note may be an electronic transcription/translation of spoken language to printed text using the Dragon Dictation System.    Khadra Gandhi, KHUSHBU, APRN, PMHNP-BC

## 2025-04-25 ENCOUNTER — OFFICE VISIT (OUTPATIENT)
Age: 30
End: 2025-04-25
Payer: COMMERCIAL

## 2025-04-25 VITALS
HEART RATE: 89 BPM | OXYGEN SATURATION: 98 % | WEIGHT: 122 LBS | SYSTOLIC BLOOD PRESSURE: 108 MMHG | DIASTOLIC BLOOD PRESSURE: 70 MMHG | HEIGHT: 65 IN | BODY MASS INDEX: 20.33 KG/M2

## 2025-04-25 DIAGNOSIS — G47.00 INSOMNIA, UNSPECIFIED TYPE: ICD-10-CM

## 2025-04-25 DIAGNOSIS — F31.60 BIPOLAR AFFECTIVE DISORDER, MIXED: Primary | ICD-10-CM

## 2025-04-25 DIAGNOSIS — F42.2 MIXED OBSESSIONAL THOUGHTS AND ACTS: ICD-10-CM

## 2025-04-25 RX ORDER — FLUVOXAMINE MALEATE 50 MG
TABLET ORAL
Qty: 53 TABLET | Refills: 0 | Status: SHIPPED | OUTPATIENT
Start: 2025-04-25 | End: 2025-05-25

## 2025-04-25 NOTE — PROGRESS NOTES
Subjective   Deann Coon is a 29 y.o. female who presents today in follow up for management of her disorder, OCD and insomnia.    Patient reports a progressive worsening of numerous psychiatric symptoms when compared to clinical status at her last visit.  More specifically, patient continues to endorse rather significant concern over potential chemical exposure/contamination and ongoing issues with herself contaminating those around her and her food due to this prolonged chemical exposure.  Patient further explains that she attempted to use pain thinner in her room approximately 2 years ago followed by the onset of symptoms associated with potential chemical exposure and her significant distress/fear that she is contaminating those around her.  It is of note the patient does also report a history of OCD stating that as a child she did experience numerous OCD symptoms primarily involving contamination as well.  Patient does also endorse rather significant depressive and anxious symptoms directly related with her ongoing fear of contamination/contaminating others and currently endorses a depressed mood associated with anhedonia, decreased levels of energy/fatigue, low self worth, excessive feelings of guilt, increased levels of anxiety, difficulties winding down/relaxing, near constant feelings of restlessness/feeling on edge, and a frequent fear that something bad or awful is going to happen.  Patient does report consistent compliance with her psychiatric medications and denies any associated side effects. Patient otherwise denies any auditory, visual, or tactile hallucinations and does not currently appear to be responding to internal stimuli.  Patient denies any generalized paranoia and displays no evidence of delusional thinking.    The following portions of the patient's history were reviewed and updated as appropriate: allergies, current medications, past family history, past medical history, past social  history, past surgical history and problem list.  History of Present Illness               Past Medical History:  Past Medical History:   Diagnosis Date    Asthma     Bipolar disorder        Social History:  Social History     Socioeconomic History    Marital status: Single   Tobacco Use    Smoking status: Never    Smokeless tobacco: Never   Vaping Use    Vaping status: Never Used   Substance and Sexual Activity    Alcohol use: Yes     Comment: sometimes    Drug use: Not Currently     Types: Marijuana    Sexual activity: Defer       Family History:  History reviewed. No pertinent family history.    Past Surgical History:  Past Surgical History:   Procedure Laterality Date    TONSILLECTOMY         Problem List:  Patient Active Problem List   Diagnosis    OCD (obsessive compulsive disorder)    Malnutrition    Constipation    Schizoaffective disorder    Bipolar affective disorder, mixed    Antipsychotic-induced akathisia    Insomnia       Allergy:   No Known Allergies     Current Medications:   Current Outpatient Medications   Medication Sig Dispense Refill    Cariprazine HCl (Vraylar) 6 MG capsule Take 1 capsule by mouth Daily for 60 days. 30 capsule 1    fluvoxaMINE (LUVOX) 50 MG tablet Take 1 tablet by mouth Every Night for 7 days, THEN 2 tablets Every Night for 23 days. 53 tablet 0    hydrOXYzine (ATARAX) 25 MG tablet Take 2 tablets by mouth 3 (Three) Times a Day As Needed for Itching. Take 1-2 tablets by mouth 3 times per day as needed for anxiety. 30 tablet 2    multivitamin with minerals tablet tablet Take 1 tablet by mouth Daily. 120 tablet 2    polyethylene glycol (MIRALAX) 17 GM/SCOOP powder Take 17 g (1 CAPFUL) by mouth Daily As Needed (constipation). 238 g 0     No current facility-administered medications for this visit.       Review of Symptoms:    Review of Systems   Constitutional:  Positive for activity change and fatigue.   HENT:  Negative for tinnitus.    Endocrine: Negative for cold intolerance and  "heat intolerance.   Skin:  Negative for rash.   Neurological:  Negative for seizures and confusion.   Psychiatric/Behavioral:  Positive for decreased concentration, sleep disturbance and depressed mood. Negative for hallucinations, self-injury and suicidal ideas. The patient is nervous/anxious.          Physical Exam:   Blood pressure 108/70, pulse 89, height 165.1 cm (65\"), weight 55.3 kg (122 lb), SpO2 98%.  Appearance: Appears documented age, appropriate hygiene and grooming.  Gait, Station, Strength: Normal gait, station and strength.  Physical Exam               Mental Status Exam:   Hygiene:   good  Cooperation:  Cooperative  Eye Contact:  Good  Psychomotor Behavior:  Appropriate  Affect:  Appropriate and Restricted  Mood: sad, depressed, and anxious  Hopelessness: 4  Speech:  Normal  Thought Process:  Goal directed and Linear  Thought Content:   Largely normal, bizarre at times.  Suicidal:  None  Homicidal:  None  Hallucinations:  None  Delusion:  Other ongoing thoughts of contamination  Memory:  Intact  Orientation:  Person, Place, Time, and Situation  Reliability:  good  Insight:  Good  Judgement:  Good  Impulse Control:  Good      Lab Results:   No visits with results within 1 Month(s) from this visit.   Latest known visit with results is:   Admission on 01/28/2025, Discharged on 01/28/2025   Component Date Value Ref Range Status    Color, UA 01/28/2025 Yellow  Yellow, Straw Final    Appearance, UA 01/28/2025 Cloudy (A)  Clear Final    pH, UA 01/28/2025 7.0  5.0 - 8.0 Final    Specific Gravity, UA 01/28/2025 1.020  1.005 - 1.030 Final    Glucose, UA 01/28/2025 Negative  Negative Final    Ketones, UA 01/28/2025 40 mg/dL (2+) (A)  Negative Final    Bilirubin, UA 01/28/2025 Negative  Negative Final    Blood, UA 01/28/2025 Negative  Negative Final    Protein, UA 01/28/2025 30 mg/dL (1+) (A)  Negative Final    Leuk Esterase, UA 01/28/2025 Negative  Negative Final    Nitrite, UA 01/28/2025 Positive (A)  Negative " Final    Urobilinogen, UA 01/28/2025 0.2 E.U./dL  0.2 - 1.0 E.U./dL Final    RBC, UA 01/28/2025 None Seen  None Seen, 0-2 /HPF Final    WBC, UA 01/28/2025 3-5 (A)  None Seen, 0-2 /HPF Final    Bacteria, UA 01/28/2025 Trace (A)  None Seen /HPF Final    Squamous Epithelial Cells, UA 01/28/2025 0-2  None Seen, 0-2 /HPF Final    Hyaline Casts, UA 01/28/2025 None Seen  None Seen /LPF Final    Amorphous Crystals, UA 01/28/2025 Moderate/2+  None Seen /HPF Final    Methodology 01/28/2025 Manual Light Microscopy   Final    Extra Tube 01/28/2025 Hold for add-ons.   Final    Auto resulted.     Results            PHQ-9 Total Score: 12    SILVIA-7 Total Score: 15     Assessment & Plan    Diagnoses and all orders for this visit:    1. Bipolar affective disorder, mixed (Primary)  -     Cariprazine HCl (Vraylar) 6 MG capsule; Take 1 capsule by mouth Daily for 60 days.  Dispense: 30 capsule; Refill: 1    2. Mixed obsessional thoughts and acts  -     Cariprazine HCl (Vraylar) 6 MG capsule; Take 1 capsule by mouth Daily for 60 days.  Dispense: 30 capsule; Refill: 1  -     fluvoxaMINE (LUVOX) 50 MG tablet; Take 1 tablet by mouth Every Night for 7 days, THEN 2 tablets Every Night for 23 days.  Dispense: 53 tablet; Refill: 0    3. Insomnia, unspecified type  -     Cariprazine HCl (Vraylar) 6 MG capsule; Take 1 capsule by mouth Daily for 60 days.  Dispense: 30 capsule; Refill: 1         Deann C Jaymie is a 29 y.o. female who presents today in follow up for management of bipolar disorder, OCD and insomnia.    It is of note that today marks first visit with new provider.  Per chart review patient has an established diagnosis of bipolar disorder for schizoaffective disorder as well as OCD and insomnia.  Patient reports an established history of OCD stating that she is experiencing numerous OCD symptoms primarily involving contamination as a child which has more recently developed into her ongoing fear of contamination/contaminating others  associated with chemical exposure as detailed above.  It is difficult to determine the exact etiology of the patient's ongoing symptoms but I do feel as if the symptoms are directly related to the patient's established diagnosis of OCD and as such I feel as if patient needs to be reinitiated on fluvoxamine at this time.  Patient's symptoms do certainly border on overt psychotic symptoms versus severe OCD and we will continue to delineate patient's diagnoses in the future upon further assessment and pending tolerance/response to recommended treatment regimen.  While patient reports little to no therapeutic benefit associated with current dosage of cariprazine she denies experiencing any side effects and as such I do recommend maximizing his medication at this time.  I recommend making no further medication adjustments and patient will be seen again here in the office in approximately 6 weeks.  Patient was advised to contact her clinic in approximately 3 weeks to discuss further increasing daily dose of fluoxetine if necessary.  Patient voices understanding of this and is agreeable to today's plan.    Medications:  -Increase cariprazine from 4.5 mg to 6 mg p.o. once daily at this time.  - Reinitiate fluoxetine 50 mg p.o. nightly x 7 days.  Pending tolerance/response further increase to fluvoxamine 100 mg p.o. nightly.  - Continue hydroxyzine 50 mg p.o. once daily as needed for acute anxiety/insomnia.    TREATMENT PLAN - SHORT AND LONG-TERM GOALS:   -Continue supportive psychotherapy efforts and medications as indicated. Treatment and medication options discussed during today's visit.   -Patient acknowledged and verbally consented to continue with current treatment plan and was educated on the importance of compliance with treatment and follow-up appointments.    SUMMARY/EDUCATION/DISCUSSION:  -Pt was given appropriate time to ask questions and concerns were addressed. A thorough discussion was had that included review  of disease process, need for continued monitoring and additional treatment options including use of pharmacological and non-pharmacological approaches to care, decisions were made and agreed upon by patient and provider.   -Discussed medication options and treatment plan of prescribed medication as well as the risks, benefits, and side effects including potential falls, possible impaired driving and metabolic adversities among others; patient acknowledged and provided verbal consent.   -Patient has been educated regarding multimodal approach with healthy nutrition, healthy sleep, regular physical activity, social activities, counseling, and medications.  -Please call the office at [phone] within normal business hours (Monday-Friday, 8:00 AM - 4:30 PM) with any worsening of symptoms or onset of intolerable side effects. Please ask to leave a message with office staff.  Please allow up to 24-48 hours for response to a patient call/question/refill request.  -Safety plan has been established and discussed in detail with the patient, who is agreeable to contact support system and/or call 911 or go to the nearest ER should he/she/they have any thoughts of harm to self or others.    MEDS ORDERED DURING VISIT:  New Medications Ordered This Visit   Medications    Cariprazine HCl (Vraylar) 6 MG capsule     Sig: Take 1 capsule by mouth Daily for 60 days.     Dispense:  30 capsule     Refill:  1    fluvoxaMINE (LUVOX) 50 MG tablet     Sig: Take 1 tablet by mouth Every Night for 7 days, THEN 2 tablets Every Night for 23 days.     Dispense:  53 tablet     Refill:  0       FOLLOW UP:  Return for Next scheduled follow up.      Doug Cortes DO    This document has been electronically signed by Doug Cortes DO  April 25, 2025 10:26 EDT    Part of this note may be an electronic transcription/translation of spoken language to printed text using the Dragon Dictation System. Some of the data in this electronic note  has been brought forward from a previous encounter, any necessary changes have been made, it has been reviewed by this provider, and it is accurate.

## 2025-04-30 ENCOUNTER — TELEPHONE (OUTPATIENT)
Age: 30
End: 2025-04-30
Payer: COMMERCIAL

## 2025-04-30 NOTE — TELEPHONE ENCOUNTER
"Patient was contacted at approximately 1610 on 4/30/2025.  Patient voices concern in regard to \"contaminating\" various pieces of furniture/the floor of our office when she was here for her recent follow-up appointment.  Patient was reassured that we do have a cleaning service here at our clinic and that our staff has access to medical grade cleaning supplies that are used frequently when needed.  Patient does voice understanding of this but admits that it is difficult for her to shake her concern/worry in regard to this perceived contamination.  Patient does also report that she has not initiated increased dose of Vraylar or fluvoxamine at this time, and patient was highly encouraged to initiate increased dose of regular and fluvoxamine as soon as possible in hopes of addressing her persistent symptoms.  Otherwise, patient will be seen again at her next scheduled appointment.  Patient voices understanding of this and is agreeable to this plan.  "
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
## 2025-04-30 NOTE — TELEPHONE ENCOUNTER
Patient called in stated that she is having some concerns and asked if you can give her a callback. Stated that it's personal didn't want to leave a message.

## 2025-05-19 ENCOUNTER — TELEPHONE (OUTPATIENT)
Age: 30
End: 2025-05-19
Payer: COMMERCIAL

## 2025-05-19 NOTE — TELEPHONE ENCOUNTER
Patient called and wanted to leave you a message letting you knowing that she kind of doing better. Stated that she is still having worries and concerns about chemicals. Asked if you can give her a callback when you get a chance.     I got Frida's approval for telehealth appt and got Denan scheduled.

## 2025-05-30 ENCOUNTER — TELEPHONE (OUTPATIENT)
Age: 30
End: 2025-05-30
Payer: COMMERCIAL

## 2025-05-30 DIAGNOSIS — F42.2 MIXED OBSESSIONAL THOUGHTS AND ACTS: ICD-10-CM

## 2025-05-30 RX ORDER — FLUVOXAMINE MALEATE 100 MG
100 TABLET ORAL NIGHTLY
Qty: 60 TABLET | Refills: 0 | Status: SHIPPED | OUTPATIENT
Start: 2025-05-30 | End: 2025-07-29

## 2025-05-30 NOTE — TELEPHONE ENCOUNTER
Patient called and stated that she is doing better has been able to shower but not really eat. She called last week to check in with you, stated that she would like to speak with you if you can give her a call back.

## 2025-05-30 NOTE — TELEPHONE ENCOUNTER
Attempted to contact the patient on 2 separate occasions at approximately 1650 and 1655 on 5/30/2025.  Was unable to reach the patient so a voicemail was left advising the patient to contact our clinic at her earliest convenience to discuss tolerance/response to recently prescribed fluvoxamine.  Upon chart review it does appear as if patient is in need of a refill of this medication and has such a prescription for fluvoxamine 100 mg p.o. nightly will be sent to patient's pharmacy at this time.  Otherwise, I will attempt to contact patient once again early next week.

## 2025-06-30 ENCOUNTER — TELEMEDICINE (OUTPATIENT)
Age: 30
End: 2025-06-30
Payer: COMMERCIAL

## 2025-06-30 DIAGNOSIS — F51.01 PRIMARY INSOMNIA: ICD-10-CM

## 2025-06-30 DIAGNOSIS — F42.2 MIXED OBSESSIONAL THOUGHTS AND ACTS: Primary | ICD-10-CM

## 2025-06-30 DIAGNOSIS — F31.60 BIPOLAR AFFECTIVE DISORDER, MIXED: ICD-10-CM

## 2025-06-30 PROCEDURE — 1159F MED LIST DOCD IN RCRD: CPT | Performed by: STUDENT IN AN ORGANIZED HEALTH CARE EDUCATION/TRAINING PROGRAM

## 2025-06-30 PROCEDURE — 1160F RVW MEDS BY RX/DR IN RCRD: CPT | Performed by: STUDENT IN AN ORGANIZED HEALTH CARE EDUCATION/TRAINING PROGRAM

## 2025-06-30 PROCEDURE — 99214 OFFICE O/P EST MOD 30 MIN: CPT | Performed by: STUDENT IN AN ORGANIZED HEALTH CARE EDUCATION/TRAINING PROGRAM

## 2025-06-30 RX ORDER — FLUVOXAMINE MALEATE 100 MG
100 TABLET ORAL NIGHTLY
Qty: 60 TABLET | Refills: 0 | Status: SHIPPED | OUTPATIENT
Start: 2025-06-30 | End: 2025-08-29

## 2025-06-30 NOTE — PROGRESS NOTES
"Subjective   Deann Coon is a 30 y.o. female who presents today in follow up for management of bipolar disorder, OCD and insomnia.     Patient reports that she is doing well overall and does endorse a rather noticeable improvement in psychiatric symptoms when compared to clinical status at last visit approximately 8 weeks ago.  More specifically, patient reports the decreased frequency and severity of delusional thoughts involving fear of contamination/chemical exposure as detailed in documentation from most recent office visit.  Patient reports that since our last visit she has been able to move back into her home and live her life on a daily basis without experiencing a persistent and severe fear of contamination/associated medical issues.  She reports that many of her family members have noticed this improvement as well, and have reportedly verbalized to the patient that she has seemed more \"like myself\" over the past several weeks to 1 month.  Patient also reports an improved mood associated with improved self worth, decreased feelings of guilt, increased levels of energy, and complete resolution of anhedonia.  She currently denies any active suicidal ideation, intent or plan.  Patient also denies experiencing any overt hypomanic/manic symptoms since last visit.  She also reports an overall improvement of sleep stating that she has had no significant issues initiating or maintaining sleep since our last visit.  She continues to experience various OCD symptoms on a daily basis but does report significantly decreased severity associated with these symptoms in addition to improved ability to appropriately manage/control her obsessive thoughts and associated compulsions.  Patient reports consistent compliance with all prescribed medications, denies any associated side effects and appears to be tolerating these medications well. Patient otherwise denies any auditory, visual, or tactile hallucinations and does " not currently appear to be responding to internal stimuli.  Patient denies any generalized paranoia and displays no evidence of delusional thinking.    The following portions of the patient's history were reviewed and updated as appropriate: allergies, current medications, past family history, past medical history, past social history, past surgical history and problem list.  History of Present Illness               Past Medical History:  Past Medical History:   Diagnosis Date    Asthma     Bipolar disorder        Social History:  Social History     Socioeconomic History    Marital status: Single   Tobacco Use    Smoking status: Never    Smokeless tobacco: Never   Vaping Use    Vaping status: Never Used   Substance and Sexual Activity    Alcohol use: Yes     Comment: sometimes    Drug use: Not Currently     Types: Marijuana    Sexual activity: Defer       Family History:  History reviewed. No pertinent family history.    Past Surgical History:  Past Surgical History:   Procedure Laterality Date    TONSILLECTOMY         Problem List:  Patient Active Problem List   Diagnosis    OCD (obsessive compulsive disorder)    Malnutrition    Constipation    Schizoaffective disorder    Bipolar affective disorder, mixed    Antipsychotic-induced akathisia    Insomnia       Allergy:   No Known Allergies     Current Medications:   Current Outpatient Medications   Medication Sig Dispense Refill    fluvoxaMINE (LUVOX) 100 MG tablet Take 1 tablet by mouth Every Night for 60 days. 60 tablet 0    Cariprazine HCl 6 MG capsule Take 1 capsule by mouth Daily. 30 capsule 1    hydrOXYzine (ATARAX) 25 MG tablet Take 2 tablets by mouth 3 (Three) Times a Day As Needed for Itching. Take 1-2 tablets by mouth 3 times per day as needed for anxiety. 30 tablet 2    multivitamin with minerals tablet tablet Take 1 tablet by mouth Daily. 120 tablet 2    polyethylene glycol (MIRALAX) 17 GM/SCOOP powder Take 17 g (1 CAPFUL) by mouth Daily As Needed  (constipation). 238 g 0     No current facility-administered medications for this visit.       Review of Symptoms:    Review of Systems   Constitutional:  Positive for fatigue. Negative for activity change.   HENT:  Negative for tinnitus.    Eyes:  Negative for visual disturbance.   Cardiovascular:  Negative for palpitations.   Endocrine: Negative for cold intolerance and heat intolerance.   Skin:  Negative for rash.   Neurological:  Negative for seizures and confusion.   Psychiatric/Behavioral:  Positive for decreased concentration, sleep disturbance and depressed mood. Negative for hallucinations, self-injury and suicidal ideas. The patient is nervous/anxious.          Physical Exam:   There were no vitals taken for this visit.  Appearance: Appears documented age, appropriate hygiene and grooming.  Gait, Station, Strength: Normal gait, station and strength.  Physical Exam               Mental Status Exam:   Hygiene:   good  Cooperation:  Cooperative  Eye Contact:  Good  Psychomotor Behavior:  Appropriate  Affect:  Full range and Appropriate  Mood: normal and euthymic  Hopelessness: Denies  Speech:  Normal  Thought Process:  Goal directed and Linear  Thought Content:  Normal  Suicidal:  None  Homicidal:  None  Hallucinations:  None  Delusion:  None  Memory:  Intact  Orientation:  Person, Place, Time, and Situation  Reliability:  good  Insight:  Good  Judgement:  Good  Impulse Control:  Good      Lab Results:   No visits with results within 1 Month(s) from this visit.   Latest known visit with results is:   Admission on 01/28/2025, Discharged on 01/28/2025   Component Date Value Ref Range Status    Color, UA 01/28/2025 Yellow  Yellow, Straw Final    Appearance, UA 01/28/2025 Cloudy (A)  Clear Final    pH, UA 01/28/2025 7.0  5.0 - 8.0 Final    Specific Gravity, UA 01/28/2025 1.020  1.005 - 1.030 Final    Glucose, UA 01/28/2025 Negative  Negative Final    Ketones, UA 01/28/2025 40 mg/dL (2+) (A)  Negative Final     Bilirubin, UA 01/28/2025 Negative  Negative Final    Blood, UA 01/28/2025 Negative  Negative Final    Protein, UA 01/28/2025 30 mg/dL (1+) (A)  Negative Final    Leuk Esterase, UA 01/28/2025 Negative  Negative Final    Nitrite, UA 01/28/2025 Positive (A)  Negative Final    Urobilinogen, UA 01/28/2025 0.2 E.U./dL  0.2 - 1.0 E.U./dL Final    RBC, UA 01/28/2025 None Seen  None Seen, 0-2 /HPF Final    WBC, UA 01/28/2025 3-5 (A)  None Seen, 0-2 /HPF Final    Bacteria, UA 01/28/2025 Trace (A)  None Seen /HPF Final    Squamous Epithelial Cells, UA 01/28/2025 0-2  None Seen, 0-2 /HPF Final    Hyaline Casts, UA 01/28/2025 None Seen  None Seen /LPF Final    Amorphous Crystals, UA 01/28/2025 Moderate/2+  None Seen /HPF Final    Methodology 01/28/2025 Manual Light Microscopy   Final    Extra Tube 01/28/2025 Hold for add-ons.   Final    Auto resulted.     Results            PHQ-9 Total Score:     SILVIA-7 Total Score:      Assessment & Plan    Diagnoses and all orders for this visit:    1. Mixed obsessional thoughts and acts (Primary)  -     fluvoxaMINE (LUVOX) 100 MG tablet; Take 1 tablet by mouth Every Night for 60 days.  Dispense: 60 tablet; Refill: 0    2. Bipolar affective disorder, mixed  -     Cariprazine HCl 6 MG capsule; Take 1 capsule by mouth Daily.  Dispense: 30 capsule; Refill: 1    3. Primary insomnia         Deann Coon is a 30 y.o. female who presents today in follow up for management of bipolar disorder, OCD and insomnia.    As detailed above patient appears to be doing well overall and does endorse a rather significant improvement in psychiatric symptoms with compared to clinical status at last visit.  She reports consistent compliance with increased dose of cariprazine and recently reinitiated fluvoxamine, denies any associated side effects and appears to be tolerating his medications well.  Most notably, patient reports a significant decrease in both the frequency and severity of delusional thoughts  associated with a fear of contamination/chemical exposure stating that she was able to move back into her home and return to many aspects of daily living that she has been actively avoiding over the past several months.  Patient also reports an overall improvement in depressive and anxious symptoms in addition to an improved ability to manage her OCD symptoms appropriately.  Given rather robust therapeutic benefit associated with current medication regimen and the absence of any significant side effects I recommend making no further medication adjustments at this time and did emphasize the importance of ongoing compliance with her prescribed medications at today's visit.  Pending tolerance/response to current medication regimen we will potentially discuss further maximizing daily dosage of fluvoxamine at next visit if necessary.  Otherwise, patient will be seen again here in the clinic in approximately 5 weeks for reassessment.  Patient voices understanding of this and is agreeable to today's plan.    Medications:  -Continue increased dose of cariprazine at 6 mg p.o. once daily at this time.  - Continue fluvoxamine 100 mg p.o. nightly for management of OCD.  - Continue hydroxyzine 50 mg p.o. once daily as needed for acute anxiety/insomnia.    TREATMENT PLAN - SHORT AND LONG-TERM GOALS:   -Continue supportive psychotherapy efforts and medications as indicated. Treatment and medication options discussed during today's visit.   -Patient acknowledged and verbally consented to continue with current treatment plan and was educated on the importance of compliance with treatment and follow-up appointments.    SUMMARY/EDUCATION/DISCUSSION:  -Pt was given appropriate time to ask questions and concerns were addressed. A thorough discussion was had that included review of disease process, need for continued monitoring and additional treatment options including use of pharmacological and non-pharmacological approaches to care,  decisions were made and agreed upon by patient and provider.   -Discussed medication options and treatment plan of prescribed medication as well as the risks, benefits, and side effects including potential falls, possible impaired driving and metabolic adversities among others; patient acknowledged and provided verbal consent.   -Patient has been educated regarding multimodal approach with healthy nutrition, healthy sleep, regular physical activity, social activities, counseling, and medications.  -Please call the office at [phone] within normal business hours (Monday-Friday, 8:00 AM - 4:30 PM) with any worsening of symptoms or onset of intolerable side effects. Please ask to leave a message with office staff.  Please allow up to 24-48 hours for response to a patient call/question/refill request.  -Safety plan has been established and discussed in detail with the patient, who is agreeable to contact support system and/or call 911 or go to the nearest ER should he/she/they have any thoughts of harm to self or others.    MEDS ORDERED DURING VISIT:  New Medications Ordered This Visit   Medications    fluvoxaMINE (LUVOX) 100 MG tablet     Sig: Take 1 tablet by mouth Every Night for 60 days.     Dispense:  60 tablet     Refill:  0    Cariprazine HCl 6 MG capsule     Sig: Take 1 capsule by mouth Daily.     Dispense:  30 capsule     Refill:  1       FOLLOW UP:  Return in about 5 weeks (around 8/4/2025) for Next scheduled follow up.      Doug Cortes DO    This document has been electronically signed by Doug Cortes DO  June 30, 2025 13:30 EDT    Part of this note may be an electronic transcription/translation of spoken language to printed text using the Dragon Dictation System. Some of the data in this electronic note has been brought forward from a previous encounter, any necessary changes have been made, it has been reviewed by this provider, and it is accurate.     No

## 2025-07-02 ENCOUNTER — APPOINTMENT (OUTPATIENT)
Dept: CARDIOLOGY | Facility: HOSPITAL | Age: 30
End: 2025-07-02
Payer: COMMERCIAL

## 2025-07-02 ENCOUNTER — APPOINTMENT (OUTPATIENT)
Dept: GENERAL RADIOLOGY | Facility: HOSPITAL | Age: 30
End: 2025-07-02
Payer: COMMERCIAL

## 2025-07-02 ENCOUNTER — HOSPITAL ENCOUNTER (EMERGENCY)
Facility: HOSPITAL | Age: 30
Discharge: HOME | End: 2025-07-02
Attending: EMERGENCY MEDICINE | Admitting: EMERGENCY MEDICINE
Payer: COMMERCIAL

## 2025-07-02 VITALS
TEMPERATURE: 98 F | OXYGEN SATURATION: 98 % | DIASTOLIC BLOOD PRESSURE: 67 MMHG | SYSTOLIC BLOOD PRESSURE: 97 MMHG | HEART RATE: 70 BPM | RESPIRATION RATE: 18 BRPM

## 2025-07-02 DIAGNOSIS — R00.2 PALPITATIONS: ICD-10-CM

## 2025-07-02 DIAGNOSIS — R07.89 ATYPICAL CHEST PAIN: Primary | ICD-10-CM

## 2025-07-02 LAB
ALBUMIN SERPL-MCNC: 4.7 G/DL (ref 3.5–5.2)
ALBUMIN/GLOB SERPL: 1.7 G/DL
ALP SERPL-CCNC: 47 U/L (ref 39–117)
ALT SERPL W P-5'-P-CCNC: 16 U/L (ref 1–33)
ANION GAP SERPL CALCULATED.3IONS-SCNC: 13.3 MMOL/L (ref 5–15)
AST SERPL-CCNC: 21 U/L (ref 1–32)
BASOPHILS # BLD AUTO: 0.03 10*3/MM3 (ref 0–0.2)
BASOPHILS NFR BLD AUTO: 1 % (ref 0–1.5)
BILIRUB SERPL-MCNC: 0.6 MG/DL (ref 0–1.2)
BUN SERPL-MCNC: 7 MG/DL (ref 6–20)
BUN/CREAT SERPL: 9 (ref 7–25)
CALCIUM SPEC-SCNC: 9.7 MG/DL (ref 8.6–10.5)
CHLORIDE SERPL-SCNC: 107 MMOL/L (ref 98–107)
CO2 SERPL-SCNC: 21.7 MMOL/L (ref 22–29)
CREAT SERPL-MCNC: 0.78 MG/DL (ref 0.57–1)
DEPRECATED RDW RBC AUTO: 40.6 FL (ref 37–54)
EGFRCR SERPLBLD CKD-EPI 2021: 104.3 ML/MIN/1.73
EOSINOPHIL # BLD AUTO: 0.07 10*3/MM3 (ref 0–0.4)
EOSINOPHIL NFR BLD AUTO: 2.3 % (ref 0.3–6.2)
ERYTHROCYTE [DISTWIDTH] IN BLOOD BY AUTOMATED COUNT: 11.8 % (ref 12.3–15.4)
GEN 5 1HR TROPONIN T REFLEX: <6 NG/L
GLOBULIN UR ELPH-MCNC: 2.8 GM/DL
GLUCOSE SERPL-MCNC: 88 MG/DL (ref 65–99)
HCG SERPL QL: NEGATIVE
HCT VFR BLD AUTO: 45.2 % (ref 34–46.6)
HGB BLD-MCNC: 14.8 G/DL (ref 12–15.9)
HOLD SPECIMEN: NORMAL
IMM GRANULOCYTES # BLD AUTO: 0.01 10*3/MM3 (ref 0–0.05)
IMM GRANULOCYTES NFR BLD AUTO: 0.3 % (ref 0–0.5)
LYMPHOCYTES # BLD AUTO: 1.5 10*3/MM3 (ref 0.7–3.1)
LYMPHOCYTES NFR BLD AUTO: 48.4 % (ref 19.6–45.3)
MAGNESIUM SERPL-MCNC: 2.1 MG/DL (ref 1.6–2.6)
MCH RBC QN AUTO: 30.1 PG (ref 26.6–33)
MCHC RBC AUTO-ENTMCNC: 32.7 G/DL (ref 31.5–35.7)
MCV RBC AUTO: 92.1 FL (ref 79–97)
MONOCYTES # BLD AUTO: 0.22 10*3/MM3 (ref 0.1–0.9)
MONOCYTES NFR BLD AUTO: 7.1 % (ref 5–12)
NEUTROPHILS NFR BLD AUTO: 1.27 10*3/MM3 (ref 1.7–7)
NEUTROPHILS NFR BLD AUTO: 40.9 % (ref 42.7–76)
NRBC BLD AUTO-RTO: 0 /100 WBC (ref 0–0.2)
PLATELET # BLD AUTO: 227 10*3/MM3 (ref 140–450)
PMV BLD AUTO: 11 FL (ref 6–12)
POTASSIUM SERPL-SCNC: 3.3 MMOL/L (ref 3.5–5.2)
PROT SERPL-MCNC: 7.5 G/DL (ref 6–8.5)
QT INTERVAL: 379 MS
QTC INTERVAL: 427 MS
RBC # BLD AUTO: 4.91 10*6/MM3 (ref 3.77–5.28)
SODIUM SERPL-SCNC: 142 MMOL/L (ref 136–145)
T4 FREE SERPL-MCNC: 1.56 NG/DL (ref 0.92–1.68)
TROPONIN T NUMERIC DELTA: NORMAL
TROPONIN T SERPL HS-MCNC: <6 NG/L
TSH SERPL DL<=0.05 MIU/L-ACNC: 5.55 UIU/ML (ref 0.27–4.2)
WBC NRBC COR # BLD AUTO: 3.1 10*3/MM3 (ref 3.4–10.8)
WHOLE BLOOD HOLD COAG: NORMAL
WHOLE BLOOD HOLD SPECIMEN: NORMAL

## 2025-07-02 PROCEDURE — 71045 X-RAY EXAM CHEST 1 VIEW: CPT

## 2025-07-02 PROCEDURE — 36415 COLL VENOUS BLD VENIPUNCTURE: CPT

## 2025-07-02 PROCEDURE — 84439 ASSAY OF FREE THYROXINE: CPT | Performed by: PHYSICIAN ASSISTANT

## 2025-07-02 PROCEDURE — 80053 COMPREHEN METABOLIC PANEL: CPT | Performed by: PHYSICIAN ASSISTANT

## 2025-07-02 PROCEDURE — 83735 ASSAY OF MAGNESIUM: CPT | Performed by: PHYSICIAN ASSISTANT

## 2025-07-02 PROCEDURE — 84443 ASSAY THYROID STIM HORMONE: CPT | Performed by: PHYSICIAN ASSISTANT

## 2025-07-02 PROCEDURE — 84484 ASSAY OF TROPONIN QUANT: CPT | Performed by: PHYSICIAN ASSISTANT

## 2025-07-02 PROCEDURE — 93005 ELECTROCARDIOGRAM TRACING: CPT | Performed by: PHYSICIAN ASSISTANT

## 2025-07-02 PROCEDURE — 84703 CHORIONIC GONADOTROPIN ASSAY: CPT | Performed by: PHYSICIAN ASSISTANT

## 2025-07-02 PROCEDURE — 85025 COMPLETE CBC W/AUTO DIFF WBC: CPT | Performed by: PHYSICIAN ASSISTANT

## 2025-07-02 PROCEDURE — 99284 EMERGENCY DEPT VISIT MOD MDM: CPT

## 2025-07-02 PROCEDURE — 93246 EXT ECG>7D<15D RECORDING: CPT

## 2025-07-02 RX ORDER — ASPIRIN 81 MG/1
324 TABLET, CHEWABLE ORAL ONCE
Status: COMPLETED | OUTPATIENT
Start: 2025-07-02 | End: 2025-07-02

## 2025-07-02 RX ORDER — SODIUM CHLORIDE 0.9 % (FLUSH) 0.9 %
10 SYRINGE (ML) INJECTION AS NEEDED
Status: DISCONTINUED | OUTPATIENT
Start: 2025-07-02 | End: 2025-07-02 | Stop reason: HOSPADM

## 2025-07-02 RX ADMIN — ASPIRIN 81 MG CHEWABLE TABLET 324 MG: 81 TABLET CHEWABLE at 01:28

## 2025-07-02 NOTE — DISCHARGE INSTRUCTIONS
Please follow-up with your PCP and Cardiology    Although you are being discharged in the ED today, I encourage you to return for worsening symptoms.  Things can, and do, change such a treatment at home with medication may not be adequate.  Specifically I recommend returning for chest pain or discomfort, difficulty breathing, persistent vomiting or difficulty holding down liquids or medications, fever > 102.0 F,  or any other worsening or alarming symptoms.     You have been evaluated in the emergency department for your presenting symptoms and deemed appropriate for discharge home.  Understand that your health care does not end here today.  It is important that your primary care physician be made aware of your visit.  I recommend calling your primary care provider in the next 48 hours to arrange follow-up care.  Your primary care provider needs to review your treatment and recovery to ensure that no further treatment is necessary.  Additional testing or procedures may be necessary as an outpatient at the discretion of your primary care provider.    I also recommend following up with your PCP for recheck of your blood pressure and treatment as needed.

## 2025-07-02 NOTE — ED PROVIDER NOTES
EMERGENCY DEPARTMENT ENCOUNTER  Room Number:  02/02  PCP: Provider, No Known  Independent Historians: Historian: Patient      HPI:  Chief Complaint: had concerns including Chest Pain.     A complete HPI/ROS/PMH/PSH/SH/FH are unobtainable due to: EM_Unobtainable History : None    Chronic or social conditions impacting patient care (Social Determinants of Health): None      Context: Deann Coon is a 30 y.o. female with a medical history of OCD, schizoaffective disorder, and bipolar affective disorder presents emergency department today complaining of a 4-day history of intermittent chest pain.  Patient describes the pain as somewhat sharp but not terribly intense.  She says its intermittent and denies any specific palliative or provocative factors.  She says it often happens at rest and that also happens with exertion.  She says it seems very random.  She denies any associated shortness of air or diaphoresis.  She denies pleuritic pain.  She denies leg swelling, recent travel, hospitalizations, surgeries, or history of DVT/PE.  She denies exogenous estrogen use.  She says tonight she got up to go to the bathroom and when she sat down to use the bathroom she  describes as feeling like her heart was racing for about a minute.  She did this has happened a few other times in the past.  She is completely asymptomatic at this time.  No known thyroid issues.  She has no cardiac history.      Review of prior external notes (non-ED) -and- Review of prior external test results outside of this encounter:   I reviewed labs from 9/24/2024, hemoglobin 14.8, TSH 1.6    PAST MEDICAL HISTORY  Active Ambulatory Problems     Diagnosis Date Noted    OCD (obsessive compulsive disorder) 09/25/2024    Malnutrition 09/25/2024    Constipation 10/01/2024    Schizoaffective disorder 11/01/2024    Bipolar affective disorder, mixed 11/15/2024    Antipsychotic-induced akathisia 12/04/2024    Insomnia 12/11/2024     Resolved Ambulatory  Problems     Diagnosis Date Noted    No Resolved Ambulatory Problems     Past Medical History:   Diagnosis Date    Asthma     Bipolar disorder          PAST SURGICAL HISTORY  Past Surgical History:   Procedure Laterality Date    TONSILLECTOMY           FAMILY HISTORY  No family history on file.      SOCIAL HISTORY  Social History     Socioeconomic History    Marital status: Single   Tobacco Use    Smoking status: Never    Smokeless tobacco: Never   Vaping Use    Vaping status: Never Used   Substance and Sexual Activity    Alcohol use: Yes     Comment: sometimes    Drug use: Not Currently     Types: Marijuana    Sexual activity: Defer         ALLERGIES  Latex      REVIEW OF SYSTEMS  Included in HPI  All systems reviewed and negative except for those discussed in HPI.      PHYSICAL EXAM    I have reviewed the triage vital signs and nursing notes.    ED Triage Vitals [07/02/25 0112]   Temp Heart Rate Resp BP SpO2   98 °F (36.7 °C) 90 16 114/84 100 %      Temp src Heart Rate Source Patient Position BP Location FiO2 (%)   -- -- -- -- --       Physical Exam  Constitutional:       General: She is not in acute distress.     Appearance: Normal appearance.   HENT:      Head: Normocephalic and atraumatic.      Nose: Nose normal.      Mouth/Throat:      Mouth: Mucous membranes are moist.   Eyes:      Extraocular Movements: Extraocular movements intact.      Pupils: Pupils are equal, round, and reactive to light.   Cardiovascular:      Rate and Rhythm: Normal rate and regular rhythm.      Pulses: Normal pulses.      Heart sounds: Normal heart sounds.   Pulmonary:      Effort: Pulmonary effort is normal. No respiratory distress.      Breath sounds: Normal breath sounds. No wheezing, rhonchi or rales.   Abdominal:      General: Abdomen is flat. There is no distension.      Palpations: Abdomen is soft.      Tenderness: There is no abdominal tenderness.   Musculoskeletal:         General: Normal range of motion.      Cervical back:  Normal range of motion and neck supple.   Skin:     General: Skin is warm and dry.      Capillary Refill: Capillary refill takes less than 2 seconds.   Neurological:      General: No focal deficit present.      Mental Status: She is alert and oriented to person, place, and time.   Psychiatric:         Mood and Affect: Mood normal.         Behavior: Behavior normal.           LAB RESULTS  Recent Results (from the past 24 hours)   ECG 12 Lead Chest Pain    Collection Time: 07/02/25  1:26 AM   Result Value Ref Range    QT Interval 379 ms    QTC Interval 427 ms   Comprehensive Metabolic Panel    Collection Time: 07/02/25  1:39 AM    Specimen: Blood   Result Value Ref Range    Glucose 88 65 - 99 mg/dL    BUN 7.0 6.0 - 20.0 mg/dL    Creatinine 0.78 0.57 - 1.00 mg/dL    Sodium 142 136 - 145 mmol/L    Potassium 3.3 (L) 3.5 - 5.2 mmol/L    Chloride 107 98 - 107 mmol/L    CO2 21.7 (L) 22.0 - 29.0 mmol/L    Calcium 9.7 8.6 - 10.5 mg/dL    Total Protein 7.5 6.0 - 8.5 g/dL    Albumin 4.7 3.5 - 5.2 g/dL    ALT (SGPT) 16 1 - 33 U/L    AST (SGOT) 21 1 - 32 U/L    Alkaline Phosphatase 47 39 - 117 U/L    Total Bilirubin 0.6 0.0 - 1.2 mg/dL    Globulin 2.8 gm/dL    A/G Ratio 1.7 g/dL    BUN/Creatinine Ratio 9.0 7.0 - 25.0    Anion Gap 13.3 5.0 - 15.0 mmol/L    eGFR 104.3 >60.0 mL/min/1.73   High Sensitivity Troponin T    Collection Time: 07/02/25  1:39 AM    Specimen: Blood   Result Value Ref Range    HS Troponin T <6 <14 ng/L   Green Top (Gel)    Collection Time: 07/02/25  1:39 AM   Result Value Ref Range    Extra Tube Hold for add-ons.    Lavender Top    Collection Time: 07/02/25  1:39 AM   Result Value Ref Range    Extra Tube hold for add-on    Light Blue Top    Collection Time: 07/02/25  1:39 AM   Result Value Ref Range    Extra Tube Hold for add-ons.    CBC Auto Differential    Collection Time: 07/02/25  1:39 AM    Specimen: Blood   Result Value Ref Range    WBC 3.10 (L) 3.40 - 10.80 10*3/mm3    RBC 4.91 3.77 - 5.28 10*6/mm3     Hemoglobin 14.8 12.0 - 15.9 g/dL    Hematocrit 45.2 34.0 - 46.6 %    MCV 92.1 79.0 - 97.0 fL    MCH 30.1 26.6 - 33.0 pg    MCHC 32.7 31.5 - 35.7 g/dL    RDW 11.8 (L) 12.3 - 15.4 %    RDW-SD 40.6 37.0 - 54.0 fl    MPV 11.0 6.0 - 12.0 fL    Platelets 227 140 - 450 10*3/mm3    Neutrophil % 40.9 (L) 42.7 - 76.0 %    Lymphocyte % 48.4 (H) 19.6 - 45.3 %    Monocyte % 7.1 5.0 - 12.0 %    Eosinophil % 2.3 0.3 - 6.2 %    Basophil % 1.0 0.0 - 1.5 %    Immature Grans % 0.3 0.0 - 0.5 %    Neutrophils, Absolute 1.27 (L) 1.70 - 7.00 10*3/mm3    Lymphocytes, Absolute 1.50 0.70 - 3.10 10*3/mm3    Monocytes, Absolute 0.22 0.10 - 0.90 10*3/mm3    Eosinophils, Absolute 0.07 0.00 - 0.40 10*3/mm3    Basophils, Absolute 0.03 0.00 - 0.20 10*3/mm3    Immature Grans, Absolute 0.01 0.00 - 0.05 10*3/mm3    nRBC 0.0 0.0 - 0.2 /100 WBC   hCG, Serum, Qualitative    Collection Time: 07/02/25  1:39 AM    Specimen: Blood   Result Value Ref Range    HCG Qualitative Negative Negative   Magnesium    Collection Time: 07/02/25  1:39 AM    Specimen: Blood   Result Value Ref Range    Magnesium 2.1 1.6 - 2.6 mg/dL   TSH    Collection Time: 07/02/25  1:39 AM    Specimen: Blood   Result Value Ref Range    TSH 5.550 (H) 0.270 - 4.200 uIU/mL   T4, Free    Collection Time: 07/02/25  1:39 AM    Specimen: Blood   Result Value Ref Range    Free T4 1.56 0.92 - 1.68 ng/dL   High Sensitivity Troponin T 1Hr    Collection Time: 07/02/25  2:50 AM    Specimen: Blood   Result Value Ref Range    HS Troponin T <6 <14 ng/L    Troponin T Numeric Delta           RADIOLOGY  XR Chest 1 View  Result Date: 7/2/2025  CXR ONE VIEW  HISTORY: Chest Pain Protocol  COMPARISON: 9/18/2024  TECHNIQUE: single portable AP       The heart size is within normal limits.  The lungs are normally aerated. There is no pleural effusion or pneumothorax.    This report was finalized on 7/2/2025 1:56 AM by Dr. Mark Burks M.D on Workstation: [id number]          MEDICATIONS GIVEN IN  ER  Medications   sodium chloride 0.9 % flush 10 mL (has no administration in time range)   aspirin chewable tablet 324 mg (324 mg Oral Given 7/2/25 0128)           OUTPATIENT MEDICATION MANAGEMENT:  Current Facility-Administered Medications Ordered in Epic   Medication Dose Route Frequency Provider Last Rate Last Admin    sodium chloride 0.9 % flush 10 mL  10 mL Intravenous PRN Cora Vicente, PA-C         Current Outpatient Medications Ordered in Epic   Medication Sig Dispense Refill    Cariprazine HCl 6 MG capsule Take 1 capsule by mouth Daily. 30 capsule 1    fluvoxaMINE (LUVOX) 100 MG tablet Take 1 tablet by mouth Every Night for 60 days. 60 tablet 0    hydrOXYzine (ATARAX) 25 MG tablet Take 2 tablets by mouth 3 (Three) Times a Day As Needed for Itching. Take 1-2 tablets by mouth 3 times per day as needed for anxiety. 30 tablet 2    multivitamin with minerals tablet tablet Take 1 tablet by mouth Daily. 120 tablet 2    polyethylene glycol (MIRALAX) 17 GM/SCOOP powder Take 17 g (1 CAPFUL) by mouth Daily As Needed (constipation). 238 g 0         PROGRESS, DATA ANALYSIS, CONSULTS, AND MEDICAL DECISION MAKING  ORDERS PLACED DURING THIS VISIT:  Orders Placed This Encounter   Procedures    XR Chest 1 View    [place] Draw    Comprehensive Metabolic Panel    High Sensitivity Troponin T    CBC Auto Differential    hCG, Serum, Qualitative    Magnesium    TSH    T4, Free    High Sensitivity Troponin T 1Hr    Continuous Pulse Oximetry    Vital Signs    Oxygen Therapy- Nasal Cannula; Titrate 1-6 LPM Per SpO2; 90 - 95%    Holter Monitor - 72 Hour Up To 15 Days    ECG 12 Lead Chest Pain    ECG 12 Lead Chest Pain    Insert Peripheral IV    CBC & Differential    Green Top (Gel)    Lavender Top    Light Blue Top       All labs have been independently interpreted by me.  All radiology studies have been reviewed by me. All EKG's have been independently viewed and interpreted by me.  Discussion below represents my analysis of  pertinent findings related to patient's condition, differential diagnosis, treatment plan and final disposition.    Differential diagnosis includes but is not limited to:   My differential diagnosis for palpitations includes but is not limited to    Arrhythmias  Atrial fibrillation/flutter  Bradycardia caused by advanced arteriovenous  block or sinus node dysfunction  Bradycardia-tachycardia syndrome (sick sinus syndrome)  Multifocal atrial tachycardia  Premature supraventricular or ventricular contractions  Sinus tachycardia or arrhythmia  Supraventricular tachycardia  Ventricular tachycardia  Shanice-Parkinson-White syndrome     Psychiatric causes  Anxiety disorder  Panic attacks  Drugs and medications  Alcohol  Caffeine  Certain prescription and over-the-counter agents (e.g., digitalis, phenothiazine, theophylline, beta agonists)  Street drugs (e.g., cocaine)  Tobacco    Nonarrhythmic cardiac causes  Atrial or ventricular septal defect  Cardiomyopathy  Congenital heart disease  Congestive heart failure  Mitral valve prolapse  Pacemaker-mediated tachycardia  Pericarditis  Valvular disease (e.g., aortic insufficiency, stenosis)    Extracardiac causes  Anemia  Electrolyte imbalance  Fever  Hyperthyroidism  Hypoglycemia  Hypovolemia  Pheochromocytoma  Pulmonary disease  Vasovagal syndrome         ED Course:  ED Course as of 07/02/25 0439   Wed Jul 02, 2025   0118 I discussed the case with Dr. Savage and she agrees to evaluate the patient at the bedside.    [CC]   0217 WBC(!): 3.10 [CC]   0217 Hemoglobin: 14.8 [CC]   0217 HCG Qualitative: Negative [CC]   0217 Magnesium: 2.1 [CC]   0217 XR Chest 1 View  My independent interpretation of chest x-ray is no acute infiltrate [CC]   0224 HS Troponin T: <6 [CC]   0237 TSH Baseline(!): 5.550 [CC]   0237 Free T4: 1.56 [CC]   0317 HS Troponin T: <6 [CC]   0352 I rechecked the patient.  She has remained asymptomatic while here in the emergency department.  I discussed the patient's  labs, radiology findings (including all incidental findings), diagnosis, and plan for discharge.  Will discharge home with a Holter monitor.  A repeat exam reveals no new worrisome changes from my initial exam findings.  The patient understands that the fact that they are being discharged does not denote that nothing is abnormal, it indicates that no clinical emergency is present and that they must follow-up as directed in order to properly maintain their health.  Follow-up instructions (specifically listed below) and return to ER precautions were given at this time.  I specifically instructed the patient to follow-up with their PCP.  The patient understands and agrees with the plan, and is ready for discharge.  All questions answered. [CC]      ED Course User Index  [CC] Cora Vicente PA-C           AS OF 04:39 EDT VITALS:    BP - 97/67  HR - 70  TEMP - 98 °F (36.7 °C)  O2 SATS - 98%    Clinical Scores:   HEART Score: 0    PERC Rule for Pulmonary Embolism - MDCalc  Calculated on Jul 02 2025 4:40 AM  0 criteria -> No need for further workup, as <2% chance of PE. If no criteria are positive and clinician’s pre-test probability is <15%, PERC Rule criteria are satisfied.    MDM:  Patient is a well-appearing 30-year-old female presents emergency department today after an episode of palpitations and atypical chest pain.  On arrival here in the emergency department vitals are reassuring, she is afebrile.  On my exam patient is well-appearing, cardiopulmonary exam is benign.    Patient was evaluated with EKG which is nonischemic in nature and shows no concerning arrhythmia.  She had serial negative troponins and given clinical presentation and evaluation here in the ED I do not suspect ACS.  Her heart score is 0.    Also consider pulmonary embolism as possible source of her symptoms but she is PERC negative.  I do not suspect PE based on presentation today.    Chest x-ray shows no concerning cardiopulmonary  findings. Additionally, her TSH was mildly elevated but her free T4 was normal.  I encouraged her to follow this up with her primary care on an outpatient basis.    Patient has had multiple episodes of palpitations although they have been very brief and nonsustained believe she would benefit from a Zio patch to monitor for palpitations and can be further evaluated on an outpatient basis.  No emergent admission is indicated at this time.  Patient is agreeable to this plan and will follow-up with cardiology.  Return precautions were given.  She will be discharged with outpatient follow-up.      DIAGNOSIS  Final diagnoses:   Palpitations   Atypical chest pain         DISPOSITION  ED Disposition       ED Disposition   Discharge    Condition   Stable    Comment   --                    Please note that portions of this document were completed with a voice recognition program.    Note Disclaimer: At [facility], we believe that sharing information builds trust and better relationships. You are receiving this note because you recently visited [facility]. It is possible you will see health information before a provider has talked with you about it. This kind of information can be easy to misunderstand. To help you fully understand what it means for your health, we urge you to discuss this note with your provider.     Cora Vicente PA-C  07/02/25 0442

## 2025-07-02 NOTE — ED PROVIDER NOTES
I have personally performed a face-to-face diagnostic evaluation of the patient.  I have reviewed and agree with the care plan as outlined by NP/PA.  My findings are as follows:    HPI:  Patient is a 30 y.o. female who presents with a 4-day history of chest pain and some intermittent palpitations.  She often feels like her heart is racing.  She has not had any associated shortness of breath, leg swelling.  No recent travel.  No personal history of VTE.  No fever, cough or congestion.      PE:  Physical Exam  Constitutional:       Appearance: Normal appearance.   HENT:      Head: Normocephalic and atraumatic.   Eyes:      Pupils: Pupils are equal, round, and reactive to light.   Cardiovascular:      Rate and Rhythm: Normal rate and regular rhythm.      Heart sounds: No murmur heard.  Musculoskeletal:      Right lower leg: No edema.      Left lower leg: No edema.   Skin:     General: Skin is warm.   Neurological:      Mental Status: She is alert and oriented to person, place, and time.           MDM:  I provided a substantiate portion of the care of this patient. I personally performed the medical decision making.    Medical records are reviewed in [facility] Everywhere, if applicable    EKG Interpreted by me: Normal sinus rhythm, normal axis, no ST segment changes    Recent Results (from the past 24 hours)   ECG 12 Lead Chest Pain    Collection Time: 07/02/25  1:26 AM   Result Value Ref Range    QT Interval 379 ms    QTC Interval 427 ms   Comprehensive Metabolic Panel    Collection Time: 07/02/25  1:39 AM    Specimen: Blood   Result Value Ref Range    Glucose 88 65 - 99 mg/dL    BUN 7.0 6.0 - 20.0 mg/dL    Creatinine 0.78 0.57 - 1.00 mg/dL    Sodium 142 136 - 145 mmol/L    Potassium 3.3 (L) 3.5 - 5.2 mmol/L    Chloride 107 98 - 107 mmol/L    CO2 21.7 (L) 22.0 - 29.0 mmol/L    Calcium 9.7 8.6 - 10.5 mg/dL    Total Protein 7.5 6.0 - 8.5 g/dL    Albumin 4.7 3.5 - 5.2 g/dL    ALT (SGPT) 16 1 - 33 U/L    AST (SGOT)  21 1 - 32 U/L    Alkaline Phosphatase 47 39 - 117 U/L    Total Bilirubin 0.6 0.0 - 1.2 mg/dL    Globulin 2.8 gm/dL    A/G Ratio 1.7 g/dL    BUN/Creatinine Ratio 9.0 7.0 - 25.0    Anion Gap 13.3 5.0 - 15.0 mmol/L    eGFR 104.3 >60.0 mL/min/1.73   High Sensitivity Troponin T    Collection Time: 07/02/25  1:39 AM    Specimen: Blood   Result Value Ref Range    HS Troponin T <6 <14 ng/L   Green Top (Gel)    Collection Time: 07/02/25  1:39 AM   Result Value Ref Range    Extra Tube Hold for add-ons.    Lavender Top    Collection Time: 07/02/25  1:39 AM   Result Value Ref Range    Extra Tube hold for add-on    Light Blue Top    Collection Time: 07/02/25  1:39 AM   Result Value Ref Range    Extra Tube Hold for add-ons.    CBC Auto Differential    Collection Time: 07/02/25  1:39 AM    Specimen: Blood   Result Value Ref Range    WBC 3.10 (L) 3.40 - 10.80 10*3/mm3    RBC 4.91 3.77 - 5.28 10*6/mm3    Hemoglobin 14.8 12.0 - 15.9 g/dL    Hematocrit 45.2 34.0 - 46.6 %    MCV 92.1 79.0 - 97.0 fL    MCH 30.1 26.6 - 33.0 pg    MCHC 32.7 31.5 - 35.7 g/dL    RDW 11.8 (L) 12.3 - 15.4 %    RDW-SD 40.6 37.0 - 54.0 fl    MPV 11.0 6.0 - 12.0 fL    Platelets 227 140 - 450 10*3/mm3    Neutrophil % 40.9 (L) 42.7 - 76.0 %    Lymphocyte % 48.4 (H) 19.6 - 45.3 %    Monocyte % 7.1 5.0 - 12.0 %    Eosinophil % 2.3 0.3 - 6.2 %    Basophil % 1.0 0.0 - 1.5 %    Immature Grans % 0.3 0.0 - 0.5 %    Neutrophils, Absolute 1.27 (L) 1.70 - 7.00 10*3/mm3    Lymphocytes, Absolute 1.50 0.70 - 3.10 10*3/mm3    Monocytes, Absolute 0.22 0.10 - 0.90 10*3/mm3    Eosinophils, Absolute 0.07 0.00 - 0.40 10*3/mm3    Basophils, Absolute 0.03 0.00 - 0.20 10*3/mm3    Immature Grans, Absolute 0.01 0.00 - 0.05 10*3/mm3    nRBC 0.0 0.0 - 0.2 /100 WBC   hCG, Serum, Qualitative    Collection Time: 07/02/25  1:39 AM    Specimen: Blood   Result Value Ref Range    HCG Qualitative Negative Negative   Magnesium    Collection Time: 07/02/25  1:39 AM    Specimen: Blood   Result Value  Ref Range    Magnesium 2.1 1.6 - 2.6 mg/dL   TSH    Collection Time: 07/02/25  1:39 AM    Specimen: Blood   Result Value Ref Range    TSH 5.550 (H) 0.270 - 4.200 uIU/mL   T4, Free    Collection Time: 07/02/25  1:39 AM    Specimen: Blood   Result Value Ref Range    Free T4 1.56 0.92 - 1.68 ng/dL   High Sensitivity Troponin T 1Hr    Collection Time: 07/02/25  2:50 AM    Specimen: Blood   Result Value Ref Range    HS Troponin T <6 <14 ng/L    Troponin T Numeric Delta       I have reviewed the above labs    XR Chest 1 View  Result Date: 7/2/2025  CXR ONE VIEW  HISTORY: Chest Pain Protocol  COMPARISON: 9/18/2024  TECHNIQUE: single portable AP       The heart size is within normal limits.  The lungs are normally aerated. There is no pleural effusion or pneumothorax.    This report was finalized on 7/2/2025 1:56 AM by Dr. Mark Burks M.D on Workstation: [id number]        My independent interpretation of the cxr: No acute process    Overall well-appearing 30-year-old female who is presenting with complaints of palpitations and atypical chest pain.  She overall looks well.  She presents afebrile with unremarkable vital signs.  Cardiopulmonary exam is normal.    She was evaluated with an EKG.  Does not demonstrate any evidence of arrhythmia or other acute ischemic changes.  She has had serially negative troponin levels and exam and history is not consistent with ACS.  HEART Score: 0    She furthermore satisfies PERC.  I do not suspect PE today  PERC Rule for Pulmonary Embolism - MDCalc  Calculated on Jul 02 2025 3:56 AM  0 criteria -> No need for further workup, as <2% chance of PE. If no criteria are positive and clinician’s pre-test probability is <15%, PERC Rule criteria are satisfied.    Chest x-ray did not demonstrate any other acute cardiopulmonary process as emergent cause of symptoms including pneumomediastinum, widened mediastinum, acute infiltrate or pneumothorax.    Will be appropriate for discharge from the  emergency department.  Will place a Zio patch number to follow-up in the outpatient setting for reevaluation and further testing.    Impression:  Final diagnoses:   Palpitations   Atypical chest pain         Disposition:  ED Disposition       ED Disposition   Discharge    Condition   Stable    Comment   --                Stacie Savage MD  07/02/25 0356       Stacie Savage MD  07/02/25 0356

## 2025-07-14 ENCOUNTER — TELEPHONE (OUTPATIENT)
Age: 30
End: 2025-07-14
Payer: COMMERCIAL

## 2025-07-14 DIAGNOSIS — F42.2 MIXED OBSESSIONAL THOUGHTS AND ACTS: Primary | ICD-10-CM

## 2025-07-14 NOTE — TELEPHONE ENCOUNTER
Patient is calling to let you know that her anxiety is up and she is not sure what she should do.    Also, she went to the ED and they gave her a heart monitor to wear.    She wants to know should she see you before the scheduled appointment or keep appointment she already has.    Please advise.

## 2025-07-16 LAB
CV ZIO BASELINE AVG BPM: 75 BPM
CV ZIO BASELINE BPM HIGH: 153 BPM
CV ZIO BASELINE BPM LOW: 45 BPM
CV ZIO DEVICE ANALYSIS TIME: NORMAL
CV ZIO ECT SVE COUNT: 8 EPISODES
CV ZIO ECT SVE CPLT COUNT: 0 EPISODES
CV ZIO ECT SVE CPLT FREQ: 0
CV ZIO ECT SVE FREQ: NORMAL
CV ZIO ECT SVE TPLT COUNT: 1 EPISODES
CV ZIO ECT SVE TPLT FREQ: NORMAL
CV ZIO ECT VE COUNT: 3 EPISODES
CV ZIO ECT VE CPLT COUNT: 0 EPISODES
CV ZIO ECT VE CPLT FREQ: 0
CV ZIO ECT VE FREQ: NORMAL
CV ZIO ECT VE TPLT COUNT: 1 EPISODES
CV ZIO ECT VE TPLT FREQ: NORMAL
CV ZIO ECTOPIC SVE COUPLET RAW PERCENT: 0 %
CV ZIO ECTOPIC SVE ISOLATED PERCENT: 0 %
CV ZIO ECTOPIC SVE TRIPLET RAW PERCENT: 0 %
CV ZIO ECTOPIC VE COUPLET RAW PERCENT: 0 %
CV ZIO ECTOPIC VE ISOLATED PERCENT: 0 %
CV ZIO ECTOPIC VE TRIPLET RAW PERCENT: 0 %
CV ZIO ENROLLMENT END: NORMAL
CV ZIO ENROLLMENT START: NORMAL
CV ZIO PATIENT EVENTS DIARIES: 0
CV ZIO PATIENT EVENTS TRIGGERS: 0
CV ZIO PAUSE COUNT: 0
CV ZIO PRESCRIPTION STATUS: NORMAL
CV ZIO SVT COUNT: 0
CV ZIO TOTAL  ENROLLMENT PERIOD: NORMAL
CV ZIO VT COUNT: 0

## 2025-07-16 RX ORDER — FLUVOXAMINE MALEATE 50 MG
50 TABLET ORAL DAILY
Qty: 30 TABLET | Refills: 0 | Status: SHIPPED | OUTPATIENT
Start: 2025-07-16

## 2025-07-16 NOTE — TELEPHONE ENCOUNTER
Patient was contacted he reports a perceived worsening of anxious symptoms over the past 1 to 2 weeks.  Patient states that she did visit the emergency department due to concerns of cardiac issues secondary to persistent tachycardia.  Patient states that she was cleared for discharge following assessment the emergency department but did wear a Holter monitor for approximately 1 week and is scheduled to follow up with a cardiologist.  Given patient's increased in her levels of anxiety I do recommend further increasing daily dosage of fluvoxamine at this time and we will send in a 50 mg dosage to be taken each day in conjunction with previously prescribed fluvoxamine 100 mg p.o. nightly for daily total of fluvoxamine 150 mg.  Patient was advised to contact the clinic in the next several weeks to request an earlier appointment pending tolerance/response.  Otherwise I will see the patient again at her next scheduled appointment on 8/11/2025.

## 2025-07-24 ENCOUNTER — OFFICE VISIT (OUTPATIENT)
Dept: INTERNAL MEDICINE | Facility: CLINIC | Age: 30
End: 2025-07-24
Payer: COMMERCIAL

## 2025-07-24 ENCOUNTER — LAB (OUTPATIENT)
Dept: LAB | Facility: HOSPITAL | Age: 30
End: 2025-07-24
Payer: COMMERCIAL

## 2025-07-24 ENCOUNTER — TELEPHONE (OUTPATIENT)
Age: 30
End: 2025-07-24
Payer: COMMERCIAL

## 2025-07-24 VITALS
HEART RATE: 84 BPM | BODY MASS INDEX: 20.66 KG/M2 | RESPIRATION RATE: 16 BRPM | HEIGHT: 65 IN | OXYGEN SATURATION: 99 % | WEIGHT: 124 LBS | DIASTOLIC BLOOD PRESSURE: 68 MMHG | SYSTOLIC BLOOD PRESSURE: 106 MMHG

## 2025-07-24 DIAGNOSIS — R00.0 TACHYCARDIA: Primary | ICD-10-CM

## 2025-07-24 DIAGNOSIS — R42 LIGHT HEADEDNESS: ICD-10-CM

## 2025-07-24 DIAGNOSIS — E83.42 HYPOMAGNESEMIA: ICD-10-CM

## 2025-07-24 DIAGNOSIS — R79.89 ELEVATED TSH: ICD-10-CM

## 2025-07-24 DIAGNOSIS — Z00.00 ANNUAL PHYSICAL EXAM: ICD-10-CM

## 2025-07-24 DIAGNOSIS — E87.6 HYPOKALEMIA: ICD-10-CM

## 2025-07-24 DIAGNOSIS — Z00.00 HEALTHCARE MAINTENANCE: ICD-10-CM

## 2025-07-24 PROBLEM — F29 UNSPECIFIED PSYCHOSIS NOT DUE TO A SUBSTANCE OR KNOWN PHYSIOLOGICAL CONDITION: Status: ACTIVE | Noted: 2024-08-31

## 2025-07-24 LAB
ALBUMIN SERPL-MCNC: 4.6 G/DL (ref 3.5–5.2)
ALBUMIN/GLOB SERPL: 1.7 G/DL
ALP SERPL-CCNC: 48 U/L (ref 39–117)
ALT SERPL W P-5'-P-CCNC: 23 U/L (ref 1–33)
ANION GAP SERPL CALCULATED.3IONS-SCNC: 9.9 MMOL/L (ref 5–15)
AST SERPL-CCNC: 23 U/L (ref 1–32)
BACTERIA UR QL AUTO: NORMAL /HPF
BASOPHILS # BLD AUTO: 0.04 10*3/MM3 (ref 0–0.2)
BASOPHILS NFR BLD AUTO: 0.7 % (ref 0–1.5)
BILIRUB SERPL-MCNC: 0.7 MG/DL (ref 0–1.2)
BILIRUB UR QL STRIP: NEGATIVE
BUN SERPL-MCNC: 9 MG/DL (ref 6–20)
BUN/CREAT SERPL: 11.4 (ref 7–25)
CALCIUM SPEC-SCNC: 10 MG/DL (ref 8.6–10.5)
CHLORIDE SERPL-SCNC: 105 MMOL/L (ref 98–107)
CHOLEST SERPL-MCNC: 123 MG/DL (ref 0–200)
CLARITY UR: CLEAR
CO2 SERPL-SCNC: 24.1 MMOL/L (ref 22–29)
COLOR UR: YELLOW
CREAT SERPL-MCNC: 0.79 MG/DL (ref 0.57–1)
DEPRECATED RDW RBC AUTO: 40.6 FL (ref 37–54)
EGFRCR SERPLBLD CKD-EPI 2021: 102.7 ML/MIN/1.73
EOSINOPHIL # BLD AUTO: 0.07 10*3/MM3 (ref 0–0.4)
EOSINOPHIL NFR BLD AUTO: 1.2 % (ref 0.3–6.2)
ERYTHROCYTE [DISTWIDTH] IN BLOOD BY AUTOMATED COUNT: 12.1 % (ref 12.3–15.4)
GLOBULIN UR ELPH-MCNC: 2.7 GM/DL
GLUCOSE SERPL-MCNC: 88 MG/DL (ref 65–99)
GLUCOSE UR STRIP-MCNC: NEGATIVE MG/DL
HBA1C MFR BLD: 4.6 % (ref 4.8–5.6)
HCT VFR BLD AUTO: 46.6 % (ref 34–46.6)
HCV AB SER QL: NORMAL
HDLC SERPL-MCNC: 49 MG/DL (ref 40–60)
HGB BLD-MCNC: 15.4 G/DL (ref 12–15.9)
HGB UR QL STRIP.AUTO: NEGATIVE
HYALINE CASTS UR QL AUTO: NORMAL /LPF
IMM GRANULOCYTES # BLD AUTO: 0.02 10*3/MM3 (ref 0–0.05)
IMM GRANULOCYTES NFR BLD AUTO: 0.3 % (ref 0–0.5)
KETONES UR QL STRIP: NEGATIVE
LDLC SERPL CALC-MCNC: 61 MG/DL (ref 0–100)
LDLC/HDLC SERPL: 1.27 {RATIO}
LEUKOCYTE ESTERASE UR QL STRIP.AUTO: NEGATIVE
LYMPHOCYTES # BLD AUTO: 1.28 10*3/MM3 (ref 0.7–3.1)
LYMPHOCYTES NFR BLD AUTO: 21.1 % (ref 19.6–45.3)
MAGNESIUM SERPL-MCNC: 2.4 MG/DL (ref 1.6–2.6)
MCH RBC QN AUTO: 30.5 PG (ref 26.6–33)
MCHC RBC AUTO-ENTMCNC: 33 G/DL (ref 31.5–35.7)
MCV RBC AUTO: 92.3 FL (ref 79–97)
MONOCYTES # BLD AUTO: 0.5 10*3/MM3 (ref 0.1–0.9)
MONOCYTES NFR BLD AUTO: 8.2 % (ref 5–12)
NEUTROPHILS NFR BLD AUTO: 4.16 10*3/MM3 (ref 1.7–7)
NEUTROPHILS NFR BLD AUTO: 68.5 % (ref 42.7–76)
NITRITE UR QL STRIP: NEGATIVE
NRBC BLD AUTO-RTO: 0 /100 WBC (ref 0–0.2)
PH UR STRIP.AUTO: 7 [PH] (ref 5–8)
PLATELET # BLD AUTO: 219 10*3/MM3 (ref 140–450)
PMV BLD AUTO: 12 FL (ref 6–12)
POTASSIUM SERPL-SCNC: 5.1 MMOL/L (ref 3.5–5.2)
PROT SERPL-MCNC: 7.3 G/DL (ref 6–8.5)
PROT UR QL STRIP: NEGATIVE
RBC # BLD AUTO: 5.05 10*6/MM3 (ref 3.77–5.28)
RBC # UR STRIP: NORMAL /HPF
REF LAB TEST METHOD: NORMAL
SODIUM SERPL-SCNC: 139 MMOL/L (ref 136–145)
SP GR UR STRIP: <=1.005 (ref 1–1.03)
SQUAMOUS #/AREA URNS HPF: NORMAL /HPF
T-UPTAKE NFR SERPL: 0.98 TBI (ref 0.8–1.3)
T4 SERPL-MCNC: 7.15 MCG/DL (ref 4.5–11.7)
TRIGL SERPL-MCNC: 59 MG/DL (ref 0–150)
TSH SERPL DL<=0.05 MIU/L-ACNC: 1.46 UIU/ML (ref 0.27–4.2)
UROBILINOGEN UR QL STRIP: NORMAL
VIT B12 BLD-MCNC: 211 PG/ML (ref 211–946)
VLDLC SERPL-MCNC: 13 MG/DL (ref 5–40)
WBC # UR STRIP: NORMAL /HPF
WBC NRBC COR # BLD AUTO: 6.07 10*3/MM3 (ref 3.4–10.8)

## 2025-07-24 PROCEDURE — 84479 ASSAY OF THYROID (T3 OR T4): CPT | Performed by: INTERNAL MEDICINE

## 2025-07-24 PROCEDURE — 83735 ASSAY OF MAGNESIUM: CPT | Performed by: INTERNAL MEDICINE

## 2025-07-24 PROCEDURE — 86803 HEPATITIS C AB TEST: CPT | Performed by: INTERNAL MEDICINE

## 2025-07-24 PROCEDURE — 80053 COMPREHEN METABOLIC PANEL: CPT | Performed by: INTERNAL MEDICINE

## 2025-07-24 PROCEDURE — 84443 ASSAY THYROID STIM HORMONE: CPT | Performed by: INTERNAL MEDICINE

## 2025-07-24 PROCEDURE — 81001 URINALYSIS AUTO W/SCOPE: CPT | Performed by: INTERNAL MEDICINE

## 2025-07-24 PROCEDURE — 83036 HEMOGLOBIN GLYCOSYLATED A1C: CPT | Performed by: INTERNAL MEDICINE

## 2025-07-24 PROCEDURE — 80061 LIPID PANEL: CPT | Performed by: INTERNAL MEDICINE

## 2025-07-24 PROCEDURE — 82607 VITAMIN B-12: CPT | Performed by: INTERNAL MEDICINE

## 2025-07-24 PROCEDURE — 36415 COLL VENOUS BLD VENIPUNCTURE: CPT | Performed by: INTERNAL MEDICINE

## 2025-07-24 PROCEDURE — 84436 ASSAY OF TOTAL THYROXINE: CPT | Performed by: INTERNAL MEDICINE

## 2025-07-24 PROCEDURE — 85025 COMPLETE CBC W/AUTO DIFF WBC: CPT | Performed by: INTERNAL MEDICINE

## 2025-07-24 RX ORDER — CARIPRAZINE 6 MG/1
CAPSULE, GELATIN COATED ORAL
COMMUNITY

## 2025-07-24 NOTE — TELEPHONE ENCOUNTER
Patient called back and wanted to let you know that her side effects has gotten worst these last few days. Her callback number is [phone].

## 2025-07-24 NOTE — PROGRESS NOTES
Chief Complaint  ER follow up (Discharged 7/2/25)    Subjective    {Problem List  Visit Diagnosis   Encounters  Notes  Medications  Labs  Result Review Imaging  Media :23}    Deann Coon presents to [facility]  History of Present Illness     History of Present Illness  The patient is a 30-year-old female who presents for follow-up after being seen at the hospital for palpitations and was found to have elevated TSH levels. She has not been seen for over 7 years by me. She is a known bipolar schizophrenia patient who sees Dr. Doug Cortes for psychiatric purposes.    She experienced an episode of rapid heart rate, which led to her visit to the ER. Although the severity of her symptoms has decreased, she continues to experience them. She reports no leg or ankle swelling. She has been experiencing these episodes for less than a year, both at rest and during exertion. She also reports chest pain.     She has been experiencing lightheadedness, which has caused her to feel disoriented and nervous about using stairs and driving. These symptoms can occur even while she is sitting. She has been working as a [employer]  but has been hesitant to work due to this lightheadedness. She has been dealing with anxiety, which has exacerbated her lightheadedness. She plans to discuss this with Dr. Cortes in the coming days. She does not believe her medication is worsening her condition.    She has been experiencing diarrhea and loose stools. Her menstrual cycles are regular.    She is uncertain about the date of her last tetanus shot and prefers to postpone it. It has been a significant amount of time since her last Pap smear. She typically receives her influenza vaccine in the fall.    She has been inpatient for psychiatric issues under Dr. Cortes's care. Her Luvox dosage was increased to 150 mg less than a week ago.    Social History:  Occupations: [employer]   Diet: Small  "meals every 2 to 3 hours  Living Condition: Living with parents      Objective   Vital Signs:  /68   Pulse 84   Resp 16   Ht 165.1 cm (65\")   Wt 56.2 kg (124 lb)   SpO2 99%   BMI 20.63 kg/m²   Estimated body mass index is 20.63 kg/m² as calculated from the following:    Height as of this encounter: 165.1 cm (65\").    Weight as of this encounter: 56.2 kg (124 lb).    BMI is within normal parameters. No other follow-up for BMI required.      Past Medical History:   Diagnosis Date    Asthma     Bipolar disorder         History reviewed. No pertinent family history.     Social History     Socioeconomic History    Marital status: Single   Tobacco Use    Smoking status: Never    Smokeless tobacco: Never   Vaping Use    Vaping status: Never Used   Substance and Sexual Activity    Alcohol use: Yes     Comment: sometimes    Drug use: Not Currently     Types: Marijuana    Sexual activity: Defer        Review of Systems     Physical Exam  Vitals reviewed.   Constitutional:       Appearance: Normal appearance.   HENT:      Head: Normocephalic and atraumatic.   Eyes:      Extraocular Movements: Extraocular movements intact.      Conjunctiva/sclera: Conjunctivae normal.      Pupils: Pupils are equal, round, and reactive to light.   Cardiovascular:      Rate and Rhythm: Normal rate and regular rhythm.      Pulses: Normal pulses.      Heart sounds: Normal heart sounds.   Pulmonary:      Effort: Pulmonary effort is normal.      Breath sounds: Normal breath sounds.   Abdominal:      General: Bowel sounds are normal.      Palpations: Abdomen is soft.   Musculoskeletal:      Cervical back: Normal range of motion and neck supple.   Skin:     General: Skin is warm and dry.   Neurological:      Mental Status: She is alert.          Result Review :{Labs  Result Review  Imaging  Med Tab  Media  Procedures :23}  {The following data was reviewed by (Optional):53669}  {Ambulatory Labs (Optional):33628}  {Data reviewed " (Optional):10170:::1}   ED  Discharged  7/2/2025 (2 hours)  [facility] EMERGENCY DEPARTMENT     Stacie Savage MD  Last attending  Treatment team Atypical chest pain +1 more  Clinical impression Chest Pain  Chief complaint     ED Provider Notes  Cora Vicente PA-C (Physician Assistant)  Emergency Medicine  Cosigned by: Stacie Savage MD at 07/02/25 0627       Attestation signed by Stacie Savage MD at 07/02/25 0627           SHARED APC FACE TO FACE: I performed a substantive part of the MDM during the patient's E/M visit. I personally evaluated and examined the patient. I personally made or approved the documented management plan and acknowledge its risk of complications.   Stacie Savage MD 7/2/2025 06:27 EDT                                   Expand All Collapse All   EMERGENCY DEPARTMENT ENCOUNTER  Room Number:  02/02  PCP: Provider, No Known  Independent Historians: Historian: Patient        HPI:  Chief Complaint: had concerns including Chest Pain.      A complete HPI/ROS/PMH/PSH/SH/FH are unobtainable due to: EM_Unobtainable History : None     Chronic or social conditions impacting patient care (Social Determinants of Health): None        Context: Deann Coon is a 30 y.o. female with a medical history of OCD, schizoaffective disorder, and bipolar affective disorder presents emergency department today complaining of a 4-day history of intermittent chest pain.  Patient describes the pain as somewhat sharp but not terribly intense.  She says its intermittent and denies any specific palliative or provocative factors.  She says it often happens at rest and that also happens with exertion.  She says it seems very random.  She denies any associated shortness of air or diaphoresis.  She denies pleuritic pain.  She denies leg swelling, recent travel, hospitalizations, surgeries, or history of DVT/PE.  She denies exogenous estrogen use.  She says tonight she got up to go to the  bathroom and when she sat down to use the bathroom she  describes as feeling like her heart was racing for about a minute.  She did this has happened a few other times in the past.  She is completely asymptomatic at this time.  No known thyroid issues.  She has no cardiac history.        Review of prior external notes (non-ED) -and- Review of prior external test results outside of this encounter:   I reviewed labs from 9/24/2024, hemoglobin 14.8, TSH 1.6     PAST MEDICAL HISTORY       Active Ambulatory Problems     Diagnosis Date Noted    OCD (obsessive compulsive disorder) 09/25/2024    Malnutrition 09/25/2024    Constipation 10/01/2024    Schizoaffective disorder 11/01/2024    Bipolar affective disorder, mixed 11/15/2024    Antipsychotic-induced akathisia 12/04/2024    Insomnia 12/11/2024           Resolved Ambulatory Problems     Diagnosis Date Noted    No Resolved Ambulatory Problems           Past Medical History:   Diagnosis Date    Asthma      Bipolar disorder              PAST SURGICAL HISTORY  Surgical History         Past Surgical History:   Procedure Laterality Date    TONSILLECTOMY                   FAMILY HISTORY  No family history on file.        SOCIAL HISTORY  Social History   Social History            Socioeconomic History    Marital status: Single   Tobacco Use    Smoking status: Never    Smokeless tobacco: Never   Vaping Use    Vaping status: Never Used   Substance and Sexual Activity    Alcohol use: Yes       Comment: sometimes    Drug use: Not Currently       Types: Marijuana    Sexual activity: Defer               ALLERGIES  Latex        REVIEW OF SYSTEMS  Included in HPI  All systems reviewed and negative except for those discussed in HPI.        PHYSICAL EXAM     I have reviewed the triage vital signs and nursing notes.            ED Triage Vitals [07/02/25 0112]   Temp Heart Rate Resp BP SpO2   98 °F (36.7 °C) 90 16 114/84 100 %       Temp src Heart Rate Source Patient Position BP Location  FiO2 (%)   -- -- -- -- --         Physical Exam  Constitutional:       General: She is not in acute distress.     Appearance: Normal appearance.   HENT:      Head: Normocephalic and atraumatic.      Nose: Nose normal.      Mouth/Throat:      Mouth: Mucous membranes are moist.   Eyes:      Extraocular Movements: Extraocular movements intact.      Pupils: Pupils are equal, round, and reactive to light.   Cardiovascular:      Rate and Rhythm: Normal rate and regular rhythm.      Pulses: Normal pulses.      Heart sounds: Normal heart sounds.   Pulmonary:      Effort: Pulmonary effort is normal. No respiratory distress.      Breath sounds: Normal breath sounds. No wheezing, rhonchi or rales.   Abdominal:      General: Abdomen is flat. There is no distension.      Palpations: Abdomen is soft.      Tenderness: There is no abdominal tenderness.   Musculoskeletal:         General: Normal range of motion.      Cervical back: Normal range of motion and neck supple.   Skin:     General: Skin is warm and dry.      Capillary Refill: Capillary refill takes less than 2 seconds.   Neurological:      General: No focal deficit present.      Mental Status: She is alert and oriented to person, place, and time.   Psychiatric:         Mood and Affect: Mood normal.         Behavior: Behavior normal.               LAB RESULTS  Recent Results         Recent Results (from the past 24 hours)   ECG 12 Lead Chest Pain     Collection Time: 07/02/25  1:26 AM   Result Value Ref Range     QT Interval 379 ms     QTC Interval 427 ms   Comprehensive Metabolic Panel     Collection Time: 07/02/25  1:39 AM     Specimen: Blood   Result Value Ref Range     Glucose 88 65 - 99 mg/dL     BUN 7.0 6.0 - 20.0 mg/dL     Creatinine 0.78 0.57 - 1.00 mg/dL     Sodium 142 136 - 145 mmol/L     Potassium 3.3 (L) 3.5 - 5.2 mmol/L     Chloride 107 98 - 107 mmol/L     CO2 21.7 (L) 22.0 - 29.0 mmol/L     Calcium 9.7 8.6 - 10.5 mg/dL     Total Protein 7.5 6.0 - 8.5 g/dL      Albumin 4.7 3.5 - 5.2 g/dL     ALT (SGPT) 16 1 - 33 U/L     AST (SGOT) 21 1 - 32 U/L     Alkaline Phosphatase 47 39 - 117 U/L     Total Bilirubin 0.6 0.0 - 1.2 mg/dL     Globulin 2.8 gm/dL     A/G Ratio 1.7 g/dL     BUN/Creatinine Ratio 9.0 7.0 - 25.0     Anion Gap 13.3 5.0 - 15.0 mmol/L     eGFR 104.3 >60.0 mL/min/1.73   High Sensitivity Troponin T     Collection Time: 07/02/25  1:39 AM     Specimen: Blood   Result Value Ref Range     HS Troponin T <6 <14 ng/L   Green Top (Gel)     Collection Time: 07/02/25  1:39 AM   Result Value Ref Range     Extra Tube Hold for add-ons.     Lavender Top     Collection Time: 07/02/25  1:39 AM   Result Value Ref Range     Extra Tube hold for add-on     Light Blue Top     Collection Time: 07/02/25  1:39 AM   Result Value Ref Range     Extra Tube Hold for add-ons.     CBC Auto Differential     Collection Time: 07/02/25  1:39 AM     Specimen: Blood   Result Value Ref Range     WBC 3.10 (L) 3.40 - 10.80 10*3/mm3     RBC 4.91 3.77 - 5.28 10*6/mm3     Hemoglobin 14.8 12.0 - 15.9 g/dL     Hematocrit 45.2 34.0 - 46.6 %     MCV 92.1 79.0 - 97.0 fL     MCH 30.1 26.6 - 33.0 pg     MCHC 32.7 31.5 - 35.7 g/dL     RDW 11.8 (L) 12.3 - 15.4 %     RDW-SD 40.6 37.0 - 54.0 fl     MPV 11.0 6.0 - 12.0 fL     Platelets 227 140 - 450 10*3/mm3     Neutrophil % 40.9 (L) 42.7 - 76.0 %     Lymphocyte % 48.4 (H) 19.6 - 45.3 %     Monocyte % 7.1 5.0 - 12.0 %     Eosinophil % 2.3 0.3 - 6.2 %     Basophil % 1.0 0.0 - 1.5 %     Immature Grans % 0.3 0.0 - 0.5 %     Neutrophils, Absolute 1.27 (L) 1.70 - 7.00 10*3/mm3     Lymphocytes, Absolute 1.50 0.70 - 3.10 10*3/mm3     Monocytes, Absolute 0.22 0.10 - 0.90 10*3/mm3     Eosinophils, Absolute 0.07 0.00 - 0.40 10*3/mm3     Basophils, Absolute 0.03 0.00 - 0.20 10*3/mm3     Immature Grans, Absolute 0.01 0.00 - 0.05 10*3/mm3     nRBC 0.0 0.0 - 0.2 /100 WBC   hCG, Serum, Qualitative     Collection Time: 07/02/25  1:39 AM     Specimen: Blood   Result Value Ref Range      HCG Qualitative Negative Negative   Magnesium     Collection Time: 07/02/25  1:39 AM     Specimen: Blood   Result Value Ref Range     Magnesium 2.1 1.6 - 2.6 mg/dL   TSH     Collection Time: 07/02/25  1:39 AM     Specimen: Blood   Result Value Ref Range     TSH 5.550 (H) 0.270 - 4.200 uIU/mL   T4, Free     Collection Time: 07/02/25  1:39 AM     Specimen: Blood   Result Value Ref Range     Free T4 1.56 0.92 - 1.68 ng/dL   High Sensitivity Troponin T 1Hr     Collection Time: 07/02/25  2:50 AM     Specimen: Blood   Result Value Ref Range     HS Troponin T <6 <14 ng/L     Troponin T Numeric Delta                   RADIOLOGY  XR Chest 1 View  Result Date: 7/2/2025  CXR ONE VIEW  HISTORY: Chest Pain Protocol  COMPARISON: 9/18/2024  TECHNIQUE: single portable AP        The heart size is within normal limits.  The lungs are normally aerated. There is no pleural effusion or pneumothorax.    This report was finalized on 7/2/2025 1:56 AM by Dr. Mark Burks M.D on Workstation: [id number]             MEDICATIONS GIVEN IN ER  Medications   sodium chloride 0.9 % flush 10 mL (has no administration in time range)   aspirin chewable tablet 324 mg (324 mg Oral Given 7/2/25 0128)               OUTPATIENT MEDICATION MANAGEMENT:  Current Medications and Prescriptions Ordered in Epic             Current Facility-Administered Medications Ordered in Epic   Medication Dose Route Frequency Provider Last Rate Last Admin    sodium chloride 0.9 % flush 10 mL  10 mL Intravenous PRN Cora Vicente PA-C                 Current Outpatient Medications Ordered in Epic   Medication Sig Dispense Refill    Cariprazine HCl 6 MG capsule Take 1 capsule by mouth Daily. 30 capsule 1    fluvoxaMINE (LUVOX) 100 MG tablet Take 1 tablet by mouth Every Night for 60 days. 60 tablet 0    hydrOXYzine (ATARAX) 25 MG tablet Take 2 tablets by mouth 3 (Three) Times a Day As Needed for Itching. Take 1-2 tablets by mouth 3 times per day as needed for  anxiety. 30 tablet 2    multivitamin with minerals tablet tablet Take 1 tablet by mouth Daily. 120 tablet 2    polyethylene glycol (MIRALAX) 17 GM/SCOOP powder Take 17 g (1 CAPFUL) by mouth Daily As Needed (constipation). 238 g 0               PROGRESS, DATA ANALYSIS, CONSULTS, AND MEDICAL DECISION MAKING  ORDERS PLACED DURING THIS VISIT:      Orders Placed This Encounter   Procedures    XR Chest 1 View    [place] Draw    Comprehensive Metabolic Panel    High Sensitivity Troponin T    CBC Auto Differential    hCG, Serum, Qualitative    Magnesium    TSH    T4, Free    High Sensitivity Troponin T 1Hr    Continuous Pulse Oximetry    Vital Signs    Oxygen Therapy- Nasal Cannula; Titrate 1-6 LPM Per SpO2; 90 - 95%    Holter Monitor - 72 Hour Up To 15 Days    ECG 12 Lead Chest Pain    ECG 12 Lead Chest Pain    Insert Peripheral IV    CBC & Differential    Green Top (Gel)    Lavender Top    Light Blue Top         All labs have been independently interpreted by me.  All radiology studies have been reviewed by me. All EKG's have been independently viewed and interpreted by me.  Discussion below represents my analysis of pertinent findings related to patient's condition, differential diagnosis, treatment plan and final disposition.     Differential diagnosis includes but is not limited to:   My differential diagnosis for palpitations includes but is not limited to     Arrhythmias  Atrial fibrillation/flutter  Bradycardia caused by advanced arteriovenous  block or sinus node dysfunction  Bradycardia-tachycardia syndrome (sick sinus syndrome)  Multifocal atrial tachycardia  Premature supraventricular or ventricular contractions  Sinus tachycardia or arrhythmia  Supraventricular tachycardia  Ventricular tachycardia  Shanice-Parkinson-White syndrome     Psychiatric causes  Anxiety disorder  Panic attacks  Drugs and medications  Alcohol  Caffeine  Certain prescription and over-the-counter agents (e.g., digitalis, phenothiazine,  theophylline, beta agonists)  Street drugs (e.g., cocaine)  Tobacco     Nonarrhythmic cardiac causes  Atrial or ventricular septal defect  Cardiomyopathy  Congenital heart disease  Congestive heart failure  Mitral valve prolapse  Pacemaker-mediated tachycardia  Pericarditis  Valvular disease (e.g., aortic insufficiency, stenosis)     Extracardiac causes  Anemia  Electrolyte imbalance  Fever  Hyperthyroidism  Hypoglycemia  Hypovolemia  Pheochromocytoma  Pulmonary disease  Vasovagal syndrome           ED Course:      ED Course as of 07/02/25 0439   Wed Jul 02, 2025   0118 I discussed the case with Dr. Savage and she agrees to evaluate the patient at the bedside.    [CC]   0217 WBC(!): 3.10 [CC]   0217 Hemoglobin: 14.8 [CC]   0217 HCG Qualitative: Negative [CC]   0217 Magnesium: 2.1 [CC]   0217 XR Chest 1 View  My independent interpretation of chest x-ray is no acute infiltrate [CC]   0224 HS Troponin T: <6 [CC]   0237 TSH Baseline(!): 5.550 [CC]   0237 Free T4: 1.56 [CC]   0317 HS Troponin T: <6 [CC]   0352 I rechecked the patient.  She has remained asymptomatic while here in the emergency department.  I discussed the patient's labs, radiology findings (including all incidental findings), diagnosis, and plan for discharge.  Will discharge home with a Holter monitor.  A repeat exam reveals no new worrisome changes from my initial exam findings.  The patient understands that the fact that they are being discharged does not denote that nothing is abnormal, it indicates that no clinical emergency is present and that they must follow-up as directed in order to properly maintain their health.  Follow-up instructions (specifically listed below) and return to ER precautions were given at this time.  I specifically instructed the patient to follow-up with their PCP.  The patient understands and agrees with the plan, and is ready for discharge.  All questions answered. [CC]       ED Course User Index  [CC] Cora Vicente,  TRAVIS               AS OF 04:39 EDT VITALS:     BP - 97/67  HR - 70  TEMP - 98 °F (36.7 °C)  O2 SATS - 98%     Clinical Scores:   HEART Score: 0     PERC Rule for Pulmonary Embolism - MDCalc  Calculated on Jul 02 2025 4:40 AM  0 criteria -> No need for further workup, as <2% chance of PE. If no criteria are positive and clinician’s pre-test probability is <15%, PERC Rule criteria are satisfied.     MDM:  Patient is a well-appearing 30-year-old female presents emergency department today after an episode of palpitations and atypical chest pain.  On arrival here in the emergency department vitals are reassuring, she is afebrile.  On my exam patient is well-appearing, cardiopulmonary exam is benign.     Patient was evaluated with EKG which is nonischemic in nature and shows no concerning arrhythmia.  She had serial negative troponins and given clinical presentation and evaluation here in the ED I do not suspect ACS.  Her heart score is 0.     Also consider pulmonary embolism as possible source of her symptoms but she is PERC negative.  I do not suspect PE based on presentation today.     Chest x-ray shows no concerning cardiopulmonary findings. Additionally, her TSH was mildly elevated but her free T4 was normal.  I encouraged her to follow this up with her primary care on an outpatient basis.     Patient has had multiple episodes of palpitations although they have been very brief and nonsustained believe she would benefit from a Zio patch to monitor for palpitations and can be further evaluated on an outpatient basis.  No emergent admission is indicated at this time.  Patient is agreeable to this plan and will follow-up with cardiology.  Return precautions were given.  She will be discharged with outpatient follow-up.        DIAGNOSIS  Final diagnoses:   Palpitations   Atypical chest pain            DISPOSITION  ED Disposition         ED Disposition   Discharge    Condition   Stable    Comment   --                          Please note that portions of this document were completed with a voice recognition program.     Note Disclaimer: At [facility], we believe that sharing information builds trust and better relationships. You are receiving this note because you recently visited [facility]. It is possible you will see health information before a provider has talked with you about it. This kind of information can be easy to misunderstand. To help you fully understand what it means for your health, we urge you to discuss this note with your provider.     Cora Vicente PA-C  07/02/25 0442           ED Provider Notes  Stacie Savage MD (Physician)  Emergency Medicine  Expand All Collapse All     I have personally performed a face-to-face diagnostic evaluation of the patient.  I have reviewed and agree with the care plan as outlined by NP/PA.  My findings are as follows:     HPI:  Patient is a 30 y.o. female who presents with a 4-day history of chest pain and some intermittent palpitations.  She often feels like her heart is racing.  She has not had any associated shortness of breath, leg swelling.  No recent travel.  No personal history of VTE.  No fever, cough or congestion.        PE:  Physical Exam  Constitutional:       Appearance: Normal appearance.   HENT:      Head: Normocephalic and atraumatic.   Eyes:      Pupils: Pupils are equal, round, and reactive to light.   Cardiovascular:      Rate and Rhythm: Normal rate and regular rhythm.      Heart sounds: No murmur heard.  Musculoskeletal:      Right lower leg: No edema.      Left lower leg: No edema.   Skin:     General: Skin is warm.   Neurological:      Mental Status: She is alert and oriented to person, place, and time.               MDM:  I provided a substantiate portion of the care of this patient. I personally performed the medical decision making.     Medical records are reviewed in [facility] Everywhere, if applicable     EKG Interpreted by me: Normal  sinus rhythm, normal axis, no ST segment changes     Recent Results         Recent Results (from the past 24 hours)   ECG 12 Lead Chest Pain     Collection Time: 07/02/25  1:26 AM   Result Value Ref Range     QT Interval 379 ms     QTC Interval 427 ms   Comprehensive Metabolic Panel     Collection Time: 07/02/25  1:39 AM     Specimen: Blood   Result Value Ref Range     Glucose 88 65 - 99 mg/dL     BUN 7.0 6.0 - 20.0 mg/dL     Creatinine 0.78 0.57 - 1.00 mg/dL     Sodium 142 136 - 145 mmol/L     Potassium 3.3 (L) 3.5 - 5.2 mmol/L     Chloride 107 98 - 107 mmol/L     CO2 21.7 (L) 22.0 - 29.0 mmol/L     Calcium 9.7 8.6 - 10.5 mg/dL     Total Protein 7.5 6.0 - 8.5 g/dL     Albumin 4.7 3.5 - 5.2 g/dL     ALT (SGPT) 16 1 - 33 U/L     AST (SGOT) 21 1 - 32 U/L     Alkaline Phosphatase 47 39 - 117 U/L     Total Bilirubin 0.6 0.0 - 1.2 mg/dL     Globulin 2.8 gm/dL     A/G Ratio 1.7 g/dL     BUN/Creatinine Ratio 9.0 7.0 - 25.0     Anion Gap 13.3 5.0 - 15.0 mmol/L     eGFR 104.3 >60.0 mL/min/1.73   High Sensitivity Troponin T     Collection Time: 07/02/25  1:39 AM     Specimen: Blood   Result Value Ref Range     HS Troponin T <6 <14 ng/L   Green Top (Gel)     Collection Time: 07/02/25  1:39 AM   Result Value Ref Range     Extra Tube Hold for add-ons.     Lavender Top     Collection Time: 07/02/25  1:39 AM   Result Value Ref Range     Extra Tube hold for add-on     Light Blue Top     Collection Time: 07/02/25  1:39 AM   Result Value Ref Range     Extra Tube Hold for add-ons.     CBC Auto Differential     Collection Time: 07/02/25  1:39 AM     Specimen: Blood   Result Value Ref Range     WBC 3.10 (L) 3.40 - 10.80 10*3/mm3     RBC 4.91 3.77 - 5.28 10*6/mm3     Hemoglobin 14.8 12.0 - 15.9 g/dL     Hematocrit 45.2 34.0 - 46.6 %     MCV 92.1 79.0 - 97.0 fL     MCH 30.1 26.6 - 33.0 pg     MCHC 32.7 31.5 - 35.7 g/dL     RDW 11.8 (L) 12.3 - 15.4 %     RDW-SD 40.6 37.0 - 54.0 fl     MPV 11.0 6.0 - 12.0 fL     Platelets 227 140 - 450  10*3/mm3     Neutrophil % 40.9 (L) 42.7 - 76.0 %     Lymphocyte % 48.4 (H) 19.6 - 45.3 %     Monocyte % 7.1 5.0 - 12.0 %     Eosinophil % 2.3 0.3 - 6.2 %     Basophil % 1.0 0.0 - 1.5 %     Immature Grans % 0.3 0.0 - 0.5 %     Neutrophils, Absolute 1.27 (L) 1.70 - 7.00 10*3/mm3     Lymphocytes, Absolute 1.50 0.70 - 3.10 10*3/mm3     Monocytes, Absolute 0.22 0.10 - 0.90 10*3/mm3     Eosinophils, Absolute 0.07 0.00 - 0.40 10*3/mm3     Basophils, Absolute 0.03 0.00 - 0.20 10*3/mm3     Immature Grans, Absolute 0.01 0.00 - 0.05 10*3/mm3     nRBC 0.0 0.0 - 0.2 /100 WBC   hCG, Serum, Qualitative     Collection Time: 07/02/25  1:39 AM     Specimen: Blood   Result Value Ref Range     HCG Qualitative Negative Negative   Magnesium     Collection Time: 07/02/25  1:39 AM     Specimen: Blood   Result Value Ref Range     Magnesium 2.1 1.6 - 2.6 mg/dL   TSH     Collection Time: 07/02/25  1:39 AM     Specimen: Blood   Result Value Ref Range     TSH 5.550 (H) 0.270 - 4.200 uIU/mL   T4, Free     Collection Time: 07/02/25  1:39 AM     Specimen: Blood   Result Value Ref Range     Free T4 1.56 0.92 - 1.68 ng/dL   High Sensitivity Troponin T 1Hr     Collection Time: 07/02/25  2:50 AM     Specimen: Blood   Result Value Ref Range     HS Troponin T <6 <14 ng/L     Troponin T Numeric Delta             I have reviewed the above labs     XR Chest 1 View  Result Date: 7/2/2025  CXR ONE VIEW  HISTORY: Chest Pain Protocol  COMPARISON: 9/18/2024  TECHNIQUE: single portable AP        The heart size is within normal limits.  The lungs are normally aerated. There is no pleural effusion or pneumothorax.    This report was finalized on 7/2/2025 1:56 AM by Dr. Mark Burks M.D on Workstation: [id number]          My independent interpretation of the cxr: No acute process     Overall well-appearing 30-year-old female who is presenting with complaints of palpitations and atypical chest pain.  She overall looks well.  She presents afebrile with  unremarkable vital signs.  Cardiopulmonary exam is normal.     She was evaluated with an EKG.  Does not demonstrate any evidence of arrhythmia or other acute ischemic changes.  She has had serially negative troponin levels and exam and history is not consistent with ACS.  HEART Score: 0     She furthermore satisfies PERC.  I do not suspect PE today  PERC Rule for Pulmonary Embolism - MDCalc  Calculated on 2025 3:56 AM  0 criteria -> No need for further workup, as <2% chance of PE. If no criteria are positive and clinician’s pre-test probability is <15%, PERC Rule criteria are satisfied.     Chest x-ray did not demonstrate any other acute cardiopulmonary process as emergent cause of symptoms including pneumomediastinum, widened mediastinum, acute infiltrate or pneumothorax.     Will be appropriate for discharge from the emergency department.  Will place a Zio patch number to follow-up in the outpatient setting for reevaluation and further testing.     Impression:  Final diagnoses:   Palpitations   Atypical chest pain            Disposition:  ED Disposition         ED Disposition   Discharge    Condition   Stable    Comment   --                   Stacie Savage MD  [phone]        Stacie Savage MD  [phone]            [facility] CARDIOLOGY  [address] [place] [id number]  [phone]            Patient Information    Patient Name  Deann Coon MRN  [id number] Legal Sex  Female  (Age)  1995 (30 y.o.)       Holter Monitor >7 Days Up To 15 Days {22127,18547}    Patient Name: Deann Coon   Patient MRN: [id number]   Patient : 1995 (30 y.o.)   Legal Sex: Female    Accession Number: [id number]   Date of Study: 25   Ordering Provider: Cora Vicente PA-C    Clinical Indications: Palpitations [R00.2 (ICD-10-CM)]       [facility]  Performing Staff   Cardiology: Araceli Yan MD     Tech: Chica Gonzales                  Interpretation Summary         No triggered events.    A normal monitor study.     Patient had a min HR of 45 bpm, max HR of 153 bpm, and avg HR of 75 bpm. Predominant underlying rhythm was Sinus Rhythm. Slight P wave morphology changes were noted. Isolated SVEs were rare (<1.0%), SVE Triplets were rare (<1.0%), and no SVE Couplets were present. Isolated VEs were rare (<1.0%, 3), VE Triplets were rare (<1.0%, 1), and no VE Couplets were present.             Patient Hx Of Height, Weight, and Vitals    Height Weight BSA (Calculated - sq m) BMI (Calculated) Retired BMI (kg/m2) Pulse BP        70 97/67           Monitor Data    Device analysis time: 6 days 14 hours       Heart rate (average): 75 bpm         Total enrollment period: 6 days 14 hours       Heart rate minimum: 45 bpm         Combined report prescription status: COMPLETE       Heart rate maximum: 153 bpm         Enrollment period start: 07/02/2025  3:45:03 AM EDT       Total patient event diaries: 0         Enrollment period end: 07/08/2025  5:50:52 PM EDT       Total patient event triggers: 0             Key Findings    V-Tach - number of episodes: 0                   SVT - number of episodes: 0             Supraventricular Tachycardia (SVT)    SVT - number of episodes: 0               Ventricular Ectopy (VE)    Isolated VE counts: 3 episodes       VE couplets counts: 0 episodes       VE couplets frequency: 0         Isolated VE frequency: Rare       VE triplets counts: 1 episodes       VE triplets frequency: Rare         Ectopic VE couplet percent: 0 %       Ectopic VE isolated percent: 0 %       Ectopic VE triplet percent: 0 %                   V-Tach - number of episodes: 0             Supraventricular Ectopy (SVE)    Isolated SVE count: 8 episodes       SVE couplets counts: 0 episodes         SVE couplets frequency: 0       Isolated SVE frequency: Rare         SVE triplets counts: 1 episodes       SVE triplets frequency: Rare         Ectopic SVE  couplet percent: 0 %       Ectopic SVE isolated percent: 0 %         Ectopic SVE triplet percent: 0 %              Pauses & Blocks    Pause - number of episodes: 0                               Monitor Procedure      Study Description    Monitor placed on patient 07/02/2025  3:45:03 AM EDT by TB. Instructions were provided to patient on use of holter monitor and duration of monitoring.  The monitor was scanned on 7/15/2025. The patient was monitored for 6 days 14 hours. Indications for this exam include palpitations. Average HR: 75. Min HR: 45. Max HR: 153.     Study Findings    Patient diary submitted. No symptoms reported during the monitoring period. No complications noted. The predominant rhythm noted during the testing period was sinus rhythm. Premature atrial contractions did not appear during monitoring. There was no evidence of atrial arrhythmias. There were no episodes of supraventricular tachycardia. Premature ventricular contractions did not appear during monitoring. There were no episodes of ventricular tachycardia. Sinoatrial node conduction was normal. No atrioventricular block noted.     Study Impressions    A normal monitor study.                   Assessment and Plan {CC Problem List  Visit Diagnosis   ROS  Review (Popup)  Health Maintenance  Quality  BestPractice  Medications  SmartSets  SnapShot Encounters  Media :23}  Diagnoses and all orders for this visit:    1. Tachycardia (Primary)  -     Ambulatory Referral to Cardiology for Arrythmia    2. Light headedness  -     Ambulatory Referral to Cardiology for Arrythmia    3. Annual physical exam  -     Lipid Panel With LDL / HDL Ratio  -     Hemoglobin A1c  -     CBC & Differential  -     Urinalysis With Microscopic - Urine, Clean Catch  -     Vitamin B12    4. Hypomagnesemia  -     Magnesium    5. Hypokalemia  -     Comprehensive Metabolic Panel    6. Elevated TSH  -     Thyroid Panel With TSH    7. Healthcare maintenance  -      Hepatitis C antibody        Assessment & Plan  1. Tachycardia.  - Her TSH levels were elevated, indicating hypothyroidism. Previous TSH levels have fluctuated between normal and abnormal over the past 7 years.  - Her potassium levels were low, which could be due to diarrhea or loose stools. She was likely anxious during her ER visit, which could have contributed to her symptoms. Her white blood cell count was slightly low, but there was no evidence of anemia. Her EKG, troponin, and chest x-ray were all normal. The Holter monitor showed an average heart rate of 75, with no PACs, arrhythmias, or SVT. A 12-lead EKG was also normal. Her heart rate is currently normal.  - A comprehensive set of lab tests will be ordered to check her iron, B12, thyroid, cholesterol, magnesium, and potassium levels.  - A referral to cardiology will be made for further evaluation, including a possible echocardiogram and extended Zio patch monitoring. If her TSH remains elevated, she will need to start medication.    2. Lightheadedness.  - She reports experiencing lightheadedness, which may be related to her anxiety or low potassium levels.  - Her potassium levels were low, which could be due to diarrhea or loose stools. She was likely anxious during her ER visit, which could have contributed to her symptoms.  - She is advised to follow up with Dr. Cortes regarding her anxiety management.  - The comprehensive lab tests will help determine if there are any underlying causes that need to be addressed.    3. Hypokalemia.  - Her potassium levels were found to be low, which could be due to diarrhea or dietary factors.  - Her potassium levels were low, which could be due to diarrhea or loose stools.  - A recheck of her potassium levels will be included in the comprehensive lab tests.  - If her potassium levels remain low, dietary modifications or supplements may be considered.    4. Health Maintenance.  - She needs a Tdap vaccine and a Pap  smear.  - Hepatitis C screening will be included in the blood work.  - The comprehensive lab tests will include a full thyroid panel, cholesterol, and magnesium levels.  - She is advised to follow up with her gynecologist for a Pap smear.    Follow-up: 07/28/2025.          {Time Spent (Optional):82909}  Follow Up {Instructions Charge Capture  Follow-up Communications :23}  No follow-ups on file.  Patient was given instructions and counseling regarding her condition or for health maintenance advice. Please see specific information pulled into the AVS if appropriate.           Patient or patient representative verbalized consent for the use of Ambient Listening during the visit with  Jonathan Saleh MD for chart documentation. 7/24/2025  09:49 EDT

## 2025-07-24 NOTE — PROGRESS NOTES
Chief Complaint  ER follow up (Discharged 7/2/25)    Subjective    {Problem List  Visit Diagnosis   Encounters  Notes  Medications  Labs  Result Review Imaging  Media :23}    Deann Coon presents to [facility]  History of Present Illness     History of Present Illness  The patient is a 30-year-old female who presents for follow-up after being seen at the hospital for palpitations and was found to have elevated TSH levels. She has not been seen for over 7 years by me. She is a known bipolar schizophrenia patient who sees Dr. Doug Cortes for psychiatric purposes.    She experienced an episode of rapid heart rate, which led to her visit to the ER. Although the severity of her symptoms has decreased, she continues to experience them. She reports no leg or ankle swelling. She has been experiencing these episodes for less than a year, both at rest and during exertion. She also reports chest pain.     She has been experiencing lightheadedness, which has caused her to feel disoriented and nervous about using stairs and driving. These symptoms can occur even while she is sitting. She has been working as a [employer]  but has been hesitant to work due to this lightheadedness. She has been dealing with anxiety, which has exacerbated her lightheadedness. She plans to discuss this with Dr. Cortes in the coming days. She does not believe her medication is worsening her condition.    She has been experiencing diarrhea and loose stools. Her menstrual cycles are regular.    She is uncertain about the date of her last tetanus shot and prefers to postpone it. It has been a significant amount of time since her last Pap smear. She typically receives her influenza vaccine in the fall.    She has been inpatient for psychiatric issues under Dr. Cortes's care. Her Luvox dosage was increased to 150 mg less than a week ago.    Social History:  Occupations: [employer]   Diet: Small  "meals every 2 to 3 hours  Living Condition: Living with parents      Objective   Vital Signs:  /68   Pulse 84   Resp 16   Ht 165.1 cm (65\")   Wt 56.2 kg (124 lb)   SpO2 99%   BMI 20.63 kg/m²   Estimated body mass index is 20.63 kg/m² as calculated from the following:    Height as of this encounter: 165.1 cm (65\").    Weight as of this encounter: 56.2 kg (124 lb).    BMI is within normal parameters. No other follow-up for BMI required.      Past Medical History:   Diagnosis Date    Asthma     Bipolar disorder         History reviewed. No pertinent family history.     Social History     Socioeconomic History    Marital status: Single   Tobacco Use    Smoking status: Never    Smokeless tobacco: Never   Vaping Use    Vaping status: Never Used   Substance and Sexual Activity    Alcohol use: Yes     Comment: sometimes    Drug use: Not Currently     Types: Marijuana    Sexual activity: Defer        Review of Systems     Physical Exam  Vitals reviewed.   Constitutional:       Appearance: Normal appearance.   HENT:      Head: Normocephalic and atraumatic.   Eyes:      Extraocular Movements: Extraocular movements intact.      Conjunctiva/sclera: Conjunctivae normal.      Pupils: Pupils are equal, round, and reactive to light.   Cardiovascular:      Rate and Rhythm: Normal rate and regular rhythm.      Pulses: Normal pulses.      Heart sounds: Normal heart sounds.   Pulmonary:      Effort: Pulmonary effort is normal.      Breath sounds: Normal breath sounds.   Abdominal:      General: Bowel sounds are normal.      Palpations: Abdomen is soft.   Musculoskeletal:         General: Normal range of motion.      Cervical back: Normal range of motion and neck supple.   Neurological:      General: No focal deficit present.      Mental Status: She is alert. Mental status is at baseline.            Result Review :{Labs  Result Review  Imaging  Med Tab  Media  Procedures :23}  {The following data was reviewed by " (Optional):00270}  {Ambulatory Labs (Optional):23789}  [facility] CARDIOLOGY  [address] [facility] [address] [phone]            Patient Information    Patient Name  Deann Coon MRN  [id number] Legal Sex  Female  (Age)  1995 (30 y.o.)       Holter Monitor >7 Days Up To 15 Days {34124,31757}    Patient Name: Deann Coon   Patient MRN: [id number]   Patient : 1995 (30 y.o.)   Legal Sex: Female    Accession Number: [id number]   Date of Study: 25   Ordering Provider: Cora Vicente PA-C    Clinical Indications: Palpitations [R00.2 (ICD-10-CM)]       Reading Physicians  Performing Staff   Cardiology: Araceli Yan MD     Tech: Chica Gonzales                 Interpretation Summary         No triggered events.    A normal monitor study.     Patient had a min HR of 45 bpm, max HR of 153 bpm, and avg HR of 75 bpm. Predominant underlying rhythm was Sinus Rhythm. Slight P wave morphology changes were noted. Isolated SVEs were rare (<1.0%), SVE Triplets were rare (<1.0%), and no SVE Couplets were present. Isolated VEs were rare (<1.0%, 3), VE Triplets were rare (<1.0%, 1), and no VE Couplets were present.             Patient Hx Of Height, Weight, and Vitals    Height Weight BSA (Calculated - sq m) BMI (Calculated) Retired BMI (kg/m2) Pulse BP        70 97/67           Monitor Data    Device analysis time: 6 days 14 hours       Heart rate (average): 75 bpm         Total enrollment period: 6 days 14 hours       Heart rate minimum: 45 bpm         Combined report prescription status: COMPLETE       Heart rate maximum: 153 bpm         Enrollment period start: 2025  3:45:03 AM EDT       Total patient event diaries: 0         Enrollment period end: 2025  5:50:52 PM EDT       Total patient event triggers: 0             Key Findings    V-Tach - number of episodes: 0                   SVT - number of episodes: 0             Supraventricular Tachycardia  (SVT)    SVT - number of episodes: 0               Ventricular Ectopy (VE)    Isolated VE counts: 3 episodes       VE couplets counts: 0 episodes       VE couplets frequency: 0         Isolated VE frequency: Rare       VE triplets counts: 1 episodes       VE triplets frequency: Rare         Ectopic VE couplet percent: 0 %       Ectopic VE isolated percent: 0 %       Ectopic VE triplet percent: 0 %                   V-Tach - number of episodes: 0             Supraventricular Ectopy (SVE)    Isolated SVE count: 8 episodes       SVE couplets counts: 0 episodes         SVE couplets frequency: 0       Isolated SVE frequency: Rare         SVE triplets counts: 1 episodes       SVE triplets frequency: Rare         Ectopic SVE couplet percent: 0 %       Ectopic SVE isolated percent: 0 %         Ectopic SVE triplet percent: 0 %              Pauses & Blocks    Pause - number of episodes: 0                               Monitor Procedure      Study Description    Monitor placed on patient 07/02/2025  3:45:03 AM EDT by TB. Instructions were provided to patient on use of holter monitor and duration of monitoring.  The monitor was scanned on 7/15/2025. The patient was monitored for 6 days 14 hours. Indications for this exam include palpitations. Average HR: 75. Min HR: 45. Max HR: 153.     Study Findings    Patient diary submitted. No symptoms reported during the monitoring period. No complications noted. The predominant rhythm noted during the testing period was sinus rhythm. Premature atrial contractions did not appear during monitoring. There was no evidence of atrial arrhythmias. There were no episodes of supraventricular tachycardia. Premature ventricular contractions did not appear during monitoring. There were no episodes of ventricular tachycardia. Sinoatrial node conduction was normal. No atrioventricular block noted.     Study Impressions    A normal monitor study.      ED  Discharged  7/2/2025 (2 hours)  [facility]  [place] EMERGENCY DEPARTMENT     Stacie Savage MD  Last attending  Treatment team Atypical chest pain +1 more  Clinical impression Chest Pain  Chief complaint     ED Provider Notes  Cora Vicente PA-C (Physician Assistant)  Emergency Medicine  Cosigned by: Stacie Savage MD at 07/02/25 0627       Attestation signed by Stacie Savage MD at 07/02/25 0627           SHARED APC FACE TO FACE: I performed a substantive part of the MDM during the patient's E/M visit. I personally evaluated and examined the patient. I personally made or approved the documented management plan and acknowledge its risk of complications.   Stacie Savage MD 7/2/2025 06:27 EDT                                   Expand All Collapse All   EMERGENCY DEPARTMENT ENCOUNTER  Room Number:  02/02  PCP: Provider, No Known  Independent Historians: Historian: Patient        HPI:  Chief Complaint: had concerns including Chest Pain.      A complete HPI/ROS/PMH/PSH/SH/FH are unobtainable due to: EM_Unobtainable History : None     Chronic or social conditions impacting patient care (Social Determinants of Health): None        Context: Deann Coon is a 30 y.o. female with a medical history of OCD, schizoaffective disorder, and bipolar affective disorder presents emergency department today complaining of a 4-day history of intermittent chest pain.  Patient describes the pain as somewhat sharp but not terribly intense.  She says its intermittent and denies any specific palliative or provocative factors.  She says it often happens at rest and that also happens with exertion.  She says it seems very random.  She denies any associated shortness of air or diaphoresis.  She denies pleuritic pain.  She denies leg swelling, recent travel, hospitalizations, surgeries, or history of DVT/PE.  She denies exogenous estrogen use.  She says tonight she got up to go to the bathroom and when she sat down to use the bathroom she  describes as feeling  like her heart was racing for about a minute.  She did this has happened a few other times in the past.  She is completely asymptomatic at this time.  No known thyroid issues.  She has no cardiac history.        Review of prior external notes (non-ED) -and- Review of prior external test results outside of this encounter:   I reviewed labs from 9/24/2024, hemoglobin 14.8, TSH 1.6     PAST MEDICAL HISTORY       Active Ambulatory Problems     Diagnosis Date Noted    OCD (obsessive compulsive disorder) 09/25/2024    Malnutrition 09/25/2024    Constipation 10/01/2024    Schizoaffective disorder 11/01/2024    Bipolar affective disorder, mixed 11/15/2024    Antipsychotic-induced akathisia 12/04/2024    Insomnia 12/11/2024           Resolved Ambulatory Problems     Diagnosis Date Noted    No Resolved Ambulatory Problems           Past Medical History:   Diagnosis Date    Asthma      Bipolar disorder              PAST SURGICAL HISTORY  Surgical History         Past Surgical History:   Procedure Laterality Date    TONSILLECTOMY                   FAMILY HISTORY  No family history on file.        SOCIAL HISTORY  Social History   Social History            Socioeconomic History    Marital status: Single   Tobacco Use    Smoking status: Never    Smokeless tobacco: Never   Vaping Use    Vaping status: Never Used   Substance and Sexual Activity    Alcohol use: Yes       Comment: sometimes    Drug use: Not Currently       Types: Marijuana    Sexual activity: Defer               ALLERGIES  Latex        REVIEW OF SYSTEMS  Included in HPI  All systems reviewed and negative except for those discussed in HPI.        PHYSICAL EXAM     I have reviewed the triage vital signs and nursing notes.            ED Triage Vitals [07/02/25 0112]   Temp Heart Rate Resp BP SpO2   98 °F (36.7 °C) 90 16 114/84 100 %       Temp src Heart Rate Source Patient Position BP Location FiO2 (%)   -- -- -- -- --         Physical Exam  Constitutional:        General: She is not in acute distress.     Appearance: Normal appearance.   HENT:      Head: Normocephalic and atraumatic.      Nose: Nose normal.      Mouth/Throat:      Mouth: Mucous membranes are moist.   Eyes:      Extraocular Movements: Extraocular movements intact.      Pupils: Pupils are equal, round, and reactive to light.   Cardiovascular:      Rate and Rhythm: Normal rate and regular rhythm.      Pulses: Normal pulses.      Heart sounds: Normal heart sounds.   Pulmonary:      Effort: Pulmonary effort is normal. No respiratory distress.      Breath sounds: Normal breath sounds. No wheezing, rhonchi or rales.   Abdominal:      General: Abdomen is flat. There is no distension.      Palpations: Abdomen is soft.      Tenderness: There is no abdominal tenderness.   Musculoskeletal:         General: Normal range of motion.      Cervical back: Normal range of motion and neck supple.   Skin:     General: Skin is warm and dry.      Capillary Refill: Capillary refill takes less than 2 seconds.   Neurological:      General: No focal deficit present.      Mental Status: She is alert and oriented to person, place, and time.   Psychiatric:         Mood and Affect: Mood normal.         Behavior: Behavior normal.               LAB RESULTS  Recent Results         Recent Results (from the past 24 hours)   ECG 12 Lead Chest Pain     Collection Time: 07/02/25  1:26 AM   Result Value Ref Range     QT Interval 379 ms     QTC Interval 427 ms   Comprehensive Metabolic Panel     Collection Time: 07/02/25  1:39 AM     Specimen: Blood   Result Value Ref Range     Glucose 88 65 - 99 mg/dL     BUN 7.0 6.0 - 20.0 mg/dL     Creatinine 0.78 0.57 - 1.00 mg/dL     Sodium 142 136 - 145 mmol/L     Potassium 3.3 (L) 3.5 - 5.2 mmol/L     Chloride 107 98 - 107 mmol/L     CO2 21.7 (L) 22.0 - 29.0 mmol/L     Calcium 9.7 8.6 - 10.5 mg/dL     Total Protein 7.5 6.0 - 8.5 g/dL     Albumin 4.7 3.5 - 5.2 g/dL     ALT (SGPT) 16 1 - 33 U/L     AST (SGOT)  21 1 - 32 U/L     Alkaline Phosphatase 47 39 - 117 U/L     Total Bilirubin 0.6 0.0 - 1.2 mg/dL     Globulin 2.8 gm/dL     A/G Ratio 1.7 g/dL     BUN/Creatinine Ratio 9.0 7.0 - 25.0     Anion Gap 13.3 5.0 - 15.0 mmol/L     eGFR 104.3 >60.0 mL/min/1.73   High Sensitivity Troponin T     Collection Time: 07/02/25  1:39 AM     Specimen: Blood   Result Value Ref Range     HS Troponin T <6 <14 ng/L   Green Top (Gel)     Collection Time: 07/02/25  1:39 AM   Result Value Ref Range     Extra Tube Hold for add-ons.     Lavender Top     Collection Time: 07/02/25  1:39 AM   Result Value Ref Range     Extra Tube hold for add-on     Light Blue Top     Collection Time: 07/02/25  1:39 AM   Result Value Ref Range     Extra Tube Hold for add-ons.     CBC Auto Differential     Collection Time: 07/02/25  1:39 AM     Specimen: Blood   Result Value Ref Range     WBC 3.10 (L) 3.40 - 10.80 10*3/mm3     RBC 4.91 3.77 - 5.28 10*6/mm3     Hemoglobin 14.8 12.0 - 15.9 g/dL     Hematocrit 45.2 34.0 - 46.6 %     MCV 92.1 79.0 - 97.0 fL     MCH 30.1 26.6 - 33.0 pg     MCHC 32.7 31.5 - 35.7 g/dL     RDW 11.8 (L) 12.3 - 15.4 %     RDW-SD 40.6 37.0 - 54.0 fl     MPV 11.0 6.0 - 12.0 fL     Platelets 227 140 - 450 10*3/mm3     Neutrophil % 40.9 (L) 42.7 - 76.0 %     Lymphocyte % 48.4 (H) 19.6 - 45.3 %     Monocyte % 7.1 5.0 - 12.0 %     Eosinophil % 2.3 0.3 - 6.2 %     Basophil % 1.0 0.0 - 1.5 %     Immature Grans % 0.3 0.0 - 0.5 %     Neutrophils, Absolute 1.27 (L) 1.70 - 7.00 10*3/mm3     Lymphocytes, Absolute 1.50 0.70 - 3.10 10*3/mm3     Monocytes, Absolute 0.22 0.10 - 0.90 10*3/mm3     Eosinophils, Absolute 0.07 0.00 - 0.40 10*3/mm3     Basophils, Absolute 0.03 0.00 - 0.20 10*3/mm3     Immature Grans, Absolute 0.01 0.00 - 0.05 10*3/mm3     nRBC 0.0 0.0 - 0.2 /100 WBC   hCG, Serum, Qualitative     Collection Time: 07/02/25  1:39 AM     Specimen: Blood   Result Value Ref Range     HCG Qualitative Negative Negative   Magnesium     Collection Time:  07/02/25  1:39 AM     Specimen: Blood   Result Value Ref Range     Magnesium 2.1 1.6 - 2.6 mg/dL   TSH     Collection Time: 07/02/25  1:39 AM     Specimen: Blood   Result Value Ref Range     TSH 5.550 (H) 0.270 - 4.200 uIU/mL   T4, Free     Collection Time: 07/02/25  1:39 AM     Specimen: Blood   Result Value Ref Range     Free T4 1.56 0.92 - 1.68 ng/dL   High Sensitivity Troponin T 1Hr     Collection Time: 07/02/25  2:50 AM     Specimen: Blood   Result Value Ref Range     HS Troponin T <6 <14 ng/L     Troponin T Numeric Delta                   RADIOLOGY  XR Chest 1 View  Result Date: 7/2/2025  CXR ONE VIEW  HISTORY: Chest Pain Protocol  COMPARISON: 9/18/2024  TECHNIQUE: single portable AP        The heart size is within normal limits.  The lungs are normally aerated. There is no pleural effusion or pneumothorax.    This report was finalized on 7/2/2025 1:56 AM by Dr. Mark Burks M.D on Workstation: [id number]             MEDICATIONS GIVEN IN ER  Medications   sodium chloride 0.9 % flush 10 mL (has no administration in time range)   aspirin chewable tablet 324 mg (324 mg Oral Given 7/2/25 0128)               OUTPATIENT MEDICATION MANAGEMENT:  Current Medications and Prescriptions Ordered in Epic             Current Facility-Administered Medications Ordered in Epic   Medication Dose Route Frequency Provider Last Rate Last Admin    sodium chloride 0.9 % flush 10 mL  10 mL Intravenous PRN Cora Vicente PA-C                 Current Outpatient Medications Ordered in Epic   Medication Sig Dispense Refill    Cariprazine HCl 6 MG capsule Take 1 capsule by mouth Daily. 30 capsule 1    fluvoxaMINE (LUVOX) 100 MG tablet Take 1 tablet by mouth Every Night for 60 days. 60 tablet 0    hydrOXYzine (ATARAX) 25 MG tablet Take 2 tablets by mouth 3 (Three) Times a Day As Needed for Itching. Take 1-2 tablets by mouth 3 times per day as needed for anxiety. 30 tablet 2    multivitamin with minerals tablet tablet Take 1  tablet by mouth Daily. 120 tablet 2    polyethylene glycol (MIRALAX) 17 GM/SCOOP powder Take 17 g (1 CAPFUL) by mouth Daily As Needed (constipation). 238 g 0               PROGRESS, DATA ANALYSIS, CONSULTS, AND MEDICAL DECISION MAKING  ORDERS PLACED DURING THIS VISIT:      Orders Placed This Encounter   Procedures    XR Chest 1 View    [place] Draw    Comprehensive Metabolic Panel    High Sensitivity Troponin T    CBC Auto Differential    hCG, Serum, Qualitative    Magnesium    TSH    T4, Free    High Sensitivity Troponin T 1Hr    Continuous Pulse Oximetry    Vital Signs    Oxygen Therapy- Nasal Cannula; Titrate 1-6 LPM Per SpO2; 90 - 95%    Holter Monitor - 72 Hour Up To 15 Days    ECG 12 Lead Chest Pain    ECG 12 Lead Chest Pain    Insert Peripheral IV    CBC & Differential    Green Top (Gel)    Lavender Top    Light Blue Top         All labs have been independently interpreted by me.  All radiology studies have been reviewed by me. All EKG's have been independently viewed and interpreted by me.  Discussion below represents my analysis of pertinent findings related to patient's condition, differential diagnosis, treatment plan and final disposition.     Differential diagnosis includes but is not limited to:   My differential diagnosis for palpitations includes but is not limited to     Arrhythmias  Atrial fibrillation/flutter  Bradycardia caused by advanced arteriovenous  block or sinus node dysfunction  Bradycardia-tachycardia syndrome (sick sinus syndrome)  Multifocal atrial tachycardia  Premature supraventricular or ventricular contractions  Sinus tachycardia or arrhythmia  Supraventricular tachycardia  Ventricular tachycardia  Shanice-Parkinson-White syndrome     Psychiatric causes  Anxiety disorder  Panic attacks  Drugs and medications  Alcohol  Caffeine  Certain prescription and over-the-counter agents (e.g., digitalis, phenothiazine, theophylline, beta agonists)  Street drugs (e.g., cocaine)  Tobacco      Nonarrhythmic cardiac causes  Atrial or ventricular septal defect  Cardiomyopathy  Congenital heart disease  Congestive heart failure  Mitral valve prolapse  Pacemaker-mediated tachycardia  Pericarditis  Valvular disease (e.g., aortic insufficiency, stenosis)     Extracardiac causes  Anemia  Electrolyte imbalance  Fever  Hyperthyroidism  Hypoglycemia  Hypovolemia  Pheochromocytoma  Pulmonary disease  Vasovagal syndrome           ED Course:      ED Course as of 07/02/25 0439   Wed Jul 02, 2025   0118 I discussed the case with Dr. Savage and she agrees to evaluate the patient at the bedside.    [CC]   0217 WBC(!): 3.10 [CC]   0217 Hemoglobin: 14.8 [CC]   0217 HCG Qualitative: Negative [CC]   0217 Magnesium: 2.1 [CC]   0217 XR Chest 1 View  My independent interpretation of chest x-ray is no acute infiltrate [CC]   0224 HS Troponin T: <6 [CC]   0237 TSH Baseline(!): 5.550 [CC]   0237 Free T4: 1.56 [CC]   0317 HS Troponin T: <6 [CC]   0352 I rechecked the patient.  She has remained asymptomatic while here in the emergency department.  I discussed the patient's labs, radiology findings (including all incidental findings), diagnosis, and plan for discharge.  Will discharge home with a Holter monitor.  A repeat exam reveals no new worrisome changes from my initial exam findings.  The patient understands that the fact that they are being discharged does not denote that nothing is abnormal, it indicates that no clinical emergency is present and that they must follow-up as directed in order to properly maintain their health.  Follow-up instructions (specifically listed below) and return to ER precautions were given at this time.  I specifically instructed the patient to follow-up with their PCP.  The patient understands and agrees with the plan, and is ready for discharge.  All questions answered. [CC]       ED Course User Index  [CC] Cora Vicente PA-C               AS OF 04:39 EDT VITALS:     BP - 97/67  HR -  70  TEMP - 98 °F (36.7 °C)  O2 SATS - 98%     Clinical Scores:   HEART Score: 0     PERC Rule for Pulmonary Embolism - MDCalc  Calculated on Jul 02 2025 4:40 AM  0 criteria -> No need for further workup, as <2% chance of PE. If no criteria are positive and clinician’s pre-test probability is <15%, PERC Rule criteria are satisfied.     MDM:  Patient is a well-appearing 30-year-old female presents emergency department today after an episode of palpitations and atypical chest pain.  On arrival here in the emergency department vitals are reassuring, she is afebrile.  On my exam patient is well-appearing, cardiopulmonary exam is benign.     Patient was evaluated with EKG which is nonischemic in nature and shows no concerning arrhythmia.  She had serial negative troponins and given clinical presentation and evaluation here in the ED I do not suspect ACS.  Her heart score is 0.     Also consider pulmonary embolism as possible source of her symptoms but she is PERC negative.  I do not suspect PE based on presentation today.     Chest x-ray shows no concerning cardiopulmonary findings. Additionally, her TSH was mildly elevated but her free T4 was normal.  I encouraged her to follow this up with her primary care on an outpatient basis.     Patient has had multiple episodes of palpitations although they have been very brief and nonsustained believe she would benefit from a Zio patch to monitor for palpitations and can be further evaluated on an outpatient basis.  No emergent admission is indicated at this time.  Patient is agreeable to this plan and will follow-up with cardiology.  Return precautions were given.  She will be discharged with outpatient follow-up.        DIAGNOSIS  Final diagnoses:   Palpitations   Atypical chest pain            DISPOSITION  ED Disposition         ED Disposition   Discharge    Condition   Stable    Comment   --                         Please note that portions of this document were completed  with a voice recognition program.     Note Disclaimer: At [facility], we believe that sharing information builds trust and better relationships. You are receiving this note because you recently visited [facility]. It is possible you will see health information before a provider has talked with you about it. This kind of information can be easy to misunderstand. To help you fully understand what it means for your health, we urge you to discuss this note with your provider.     Cora Vicente PAKirillC  07/02/25 0442           ED Provider Notes  Stacie Savage MD (Physician)  Emergency Medicine  Expand All Collapse All     I have personally performed a face-to-face diagnostic evaluation of the patient.  I have reviewed and agree with the care plan as outlined by NP/PA.  My findings are as follows:     HPI:  Patient is a 30 y.o. female who presents with a 4-day history of chest pain and some intermittent palpitations.  She often feels like her heart is racing.  She has not had any associated shortness of breath, leg swelling.  No recent travel.  No personal history of VTE.  No fever, cough or congestion.        PE:  Physical Exam  Constitutional:       Appearance: Normal appearance.   HENT:      Head: Normocephalic and atraumatic.   Eyes:      Pupils: Pupils are equal, round, and reactive to light.   Cardiovascular:      Rate and Rhythm: Normal rate and regular rhythm.      Heart sounds: No murmur heard.  Musculoskeletal:      Right lower leg: No edema.      Left lower leg: No edema.   Skin:     General: Skin is warm.   Neurological:      Mental Status: She is alert and oriented to person, place, and time.               MDM:  I provided a substantiate portion of the care of this patient. I personally performed the medical decision making.     Medical records are reviewed in [facility] and Care Everywhere, if applicable     EKG Interpreted by me: Normal sinus rhythm, normal axis, no ST segment changes     Recent  Results         Recent Results (from the past 24 hours)   ECG 12 Lead Chest Pain     Collection Time: 07/02/25  1:26 AM   Result Value Ref Range     QT Interval 379 ms     QTC Interval 427 ms   Comprehensive Metabolic Panel     Collection Time: 07/02/25  1:39 AM     Specimen: Blood   Result Value Ref Range     Glucose 88 65 - 99 mg/dL     BUN 7.0 6.0 - 20.0 mg/dL     Creatinine 0.78 0.57 - 1.00 mg/dL     Sodium 142 136 - 145 mmol/L     Potassium 3.3 (L) 3.5 - 5.2 mmol/L     Chloride 107 98 - 107 mmol/L     CO2 21.7 (L) 22.0 - 29.0 mmol/L     Calcium 9.7 8.6 - 10.5 mg/dL     Total Protein 7.5 6.0 - 8.5 g/dL     Albumin 4.7 3.5 - 5.2 g/dL     ALT (SGPT) 16 1 - 33 U/L     AST (SGOT) 21 1 - 32 U/L     Alkaline Phosphatase 47 39 - 117 U/L     Total Bilirubin 0.6 0.0 - 1.2 mg/dL     Globulin 2.8 gm/dL     A/G Ratio 1.7 g/dL     BUN/Creatinine Ratio 9.0 7.0 - 25.0     Anion Gap 13.3 5.0 - 15.0 mmol/L     eGFR 104.3 >60.0 mL/min/1.73   High Sensitivity Troponin T     Collection Time: 07/02/25  1:39 AM     Specimen: Blood   Result Value Ref Range     HS Troponin T <6 <14 ng/L   Green Top (Gel)     Collection Time: 07/02/25  1:39 AM   Result Value Ref Range     Extra Tube Hold for add-ons.     Lavender Top     Collection Time: 07/02/25  1:39 AM   Result Value Ref Range     Extra Tube hold for add-on     Light Blue Top     Collection Time: 07/02/25  1:39 AM   Result Value Ref Range     Extra Tube Hold for add-ons.     CBC Auto Differential     Collection Time: 07/02/25  1:39 AM     Specimen: Blood   Result Value Ref Range     WBC 3.10 (L) 3.40 - 10.80 10*3/mm3     RBC 4.91 3.77 - 5.28 10*6/mm3     Hemoglobin 14.8 12.0 - 15.9 g/dL     Hematocrit 45.2 34.0 - 46.6 %     MCV 92.1 79.0 - 97.0 fL     MCH 30.1 26.6 - 33.0 pg     MCHC 32.7 31.5 - 35.7 g/dL     RDW 11.8 (L) 12.3 - 15.4 %     RDW-SD 40.6 37.0 - 54.0 fl     MPV 11.0 6.0 - 12.0 fL     Platelets 227 140 - 450 10*3/mm3     Neutrophil % 40.9 (L) 42.7 - 76.0 %     Lymphocyte  % 48.4 (H) 19.6 - 45.3 %     Monocyte % 7.1 5.0 - 12.0 %     Eosinophil % 2.3 0.3 - 6.2 %     Basophil % 1.0 0.0 - 1.5 %     Immature Grans % 0.3 0.0 - 0.5 %     Neutrophils, Absolute 1.27 (L) 1.70 - 7.00 10*3/mm3     Lymphocytes, Absolute 1.50 0.70 - 3.10 10*3/mm3     Monocytes, Absolute 0.22 0.10 - 0.90 10*3/mm3     Eosinophils, Absolute 0.07 0.00 - 0.40 10*3/mm3     Basophils, Absolute 0.03 0.00 - 0.20 10*3/mm3     Immature Grans, Absolute 0.01 0.00 - 0.05 10*3/mm3     nRBC 0.0 0.0 - 0.2 /100 WBC   hCG, Serum, Qualitative     Collection Time: 07/02/25  1:39 AM     Specimen: Blood   Result Value Ref Range     HCG Qualitative Negative Negative   Magnesium     Collection Time: 07/02/25  1:39 AM     Specimen: Blood   Result Value Ref Range     Magnesium 2.1 1.6 - 2.6 mg/dL   TSH     Collection Time: 07/02/25  1:39 AM     Specimen: Blood   Result Value Ref Range     TSH 5.550 (H) 0.270 - 4.200 uIU/mL   T4, Free     Collection Time: 07/02/25  1:39 AM     Specimen: Blood   Result Value Ref Range     Free T4 1.56 0.92 - 1.68 ng/dL   High Sensitivity Troponin T 1Hr     Collection Time: 07/02/25  2:50 AM     Specimen: Blood   Result Value Ref Range     HS Troponin T <6 <14 ng/L     Troponin T Numeric Delta             I have reviewed the above labs     XR Chest 1 View  Result Date: 7/2/2025  CXR ONE VIEW  HISTORY: Chest Pain Protocol  COMPARISON: 9/18/2024  TECHNIQUE: single portable AP        The heart size is within normal limits.  The lungs are normally aerated. There is no pleural effusion or pneumothorax.    This report was finalized on 7/2/2025 1:56 AM by Dr. Mark Burks M.D on Workstation: [id number]          My independent interpretation of the cxr: No acute process     Overall well-appearing 30-year-old female who is presenting with complaints of palpitations and atypical chest pain.  She overall looks well.  She presents afebrile with unremarkable vital signs.  Cardiopulmonary exam is normal.     She was  evaluated with an EKG.  Does not demonstrate any evidence of arrhythmia or other acute ischemic changes.  She has had serially negative troponin levels and exam and history is not consistent with ACS.  HEART Score: 0     She furthermore satisfies PERC.  I do not suspect PE today  PERC Rule for Pulmonary Embolism - MDCalc  Calculated on Jul 02 2025 3:56 AM  0 criteria -> No need for further workup, as <2% chance of PE. If no criteria are positive and clinician’s pre-test probability is <15%, PERC Rule criteria are satisfied.     Chest x-ray did not demonstrate any other acute cardiopulmonary process as emergent cause of symptoms including pneumomediastinum, widened mediastinum, acute infiltrate or pneumothorax.     Will be appropriate for discharge from the emergency department.  Will place a Zio patch number to follow-up in the outpatient setting for reevaluation and further testing.     Impression:  Final diagnoses:   Palpitations   Atypical chest pain            Disposition:  ED Disposition         ED Disposition   Discharge    Condition   Stable    Comment   --                   Stacie Savage MD  07/02/25 0356                         Assessment and Plan {CC Problem List  Visit Diagnosis   ROS  Review (Popup)  Health Maintenance  Quality  BestPractice  Medications  SmartSets  SnapShot Encounters  Media :23}  Diagnoses and all orders for this visit:    1. Tachycardia (Primary)  -     Ambulatory Referral to Cardiology for Arrythmia    2. Light headedness  -     Ambulatory Referral to Cardiology for Arrythmia    3. Annual physical exam  -     Lipid Panel With LDL / HDL Ratio  -     Hemoglobin A1c  -     CBC & Differential  -     Urinalysis With Microscopic - Urine, Clean Catch  -     Vitamin B12    4. Hypomagnesemia  -     Magnesium    5. Hypokalemia  -     Comprehensive Metabolic Panel    6. Elevated TSH  -     Thyroid Panel With TSH    7. Healthcare maintenance  -     Hepatitis C  antibody        Assessment & Plan  1. Tachycardia.  - Her TSH levels were elevated, indicating hypothyroidism. Previous TSH levels have fluctuated between normal and abnormal over the past 7 years.  - Her potassium levels were low, which could be due to diarrhea or loose stools. She was likely anxious during her ER visit, which could have contributed to her symptoms. Her white blood cell count was slightly low, but there was no evidence of anemia. Her EKG, troponin, and chest x-ray were all normal. The Holter monitor showed an average heart rate of 75, with no PACs, arrhythmias, or SVT. A 12-lead EKG was also normal. Her heart rate is currently normal.  - A comprehensive set of lab tests will be ordered to check her iron, B12, thyroid, cholesterol, magnesium, and potassium levels.  - A referral to cardiology will be made for further evaluation, including a possible echocardiogram and extended Zio patch monitoring. If her TSH remains elevated, she will need to start medication.    2. Lightheadedness.  - She reports experiencing lightheadedness, which may be related to her anxiety or low potassium levels.  - Her potassium levels were low, which could be due to diarrhea or loose stools. She was likely anxious during her ER visit, which could have contributed to her symptoms.  - She is advised to follow up with Dr. Cortes regarding her anxiety management.  - The comprehensive lab tests will help determine if there are any underlying causes that need to be addressed.    3. Hypokalemia.  - Her potassium levels were found to be low, which could be due to diarrhea or dietary factors.  - Her potassium levels were low, which could be due to diarrhea or loose stools.  - A recheck of her potassium levels will be included in the comprehensive lab tests.  - If her potassium levels remain low, dietary modifications or supplements may be considered.    4. Health Maintenance.  - She needs a Tdap vaccine and a Pap smear.  -  Hepatitis C screening will be included in the blood work.  - The comprehensive lab tests will include a full thyroid panel, cholesterol, and magnesium levels.  - She is advised to follow up with her gynecologist for a Pap smear.    Follow-up: 07/28/2025.          {Time Spent (Optional):70881}  Follow Up {Instructions Charge Capture  Follow-up Communications :23}  No follow-ups on file.  Patient was given instructions and counseling regarding her condition or for health maintenance advice. Please see specific information pulled into the AVS if appropriate.           Patient or patient representative verbalized consent for the use of Ambient Listening during the visit with  Jonathan Saleh MD for chart documentation. 7/24/2025  09:56 EDT

## 2025-07-24 NOTE — PROGRESS NOTES
Transitional Care Follow Up Visit  Subjective     Deann LIBERTAD Coon is a 30 y.o. female who presents for a transitional care management visit.    Within 48 business hours after discharge our office contacted her via telephone to coordinate her care and needs.      I reviewed and discussed the details of that call along with the discharge summary, hospital problems, inpatient lab results, inpatient diagnostic studies, and consultation reports with Deann.     Current outpatient and discharge medications have been reconciled for the patient.         No data to display              Risk for Readmission (LACE) No data recorded    History of Present Illness   Course During Hospital Stay: Discharged from ER on 7/2/2025 due to lightheadedness and tachycardia.     ED  Discharged  7/2/2025 (2 hours)  [facility] EMERGENCY DEPARTMENT     Stacie Savage MD  Last attending  Treatment team Atypical chest pain +1 more  Clinical impression Chest Pain  Chief complaint     ED Provider Notes  Cora Vicente PA-C (Physician Assistant)  Emergency Medicine  Cosigned by: Stacie Savage MD at 07/02/25 0627       Attestation signed by Stacie Savage MD at 07/02/25 0627           SHARED APC FACE TO FACE: I performed a substantive part of the MDM during the patient's E/M visit. I personally evaluated and examined the patient. I personally made or approved the documented management plan and acknowledge its risk of complications.   Stacie Savage MD 7/2/2025 06:27 EDT                                   Expand All Collapse All   EMERGENCY DEPARTMENT ENCOUNTER  Room Number:  02/02  PCP: Provider, No Known  Independent Historians: Historian: Patient        HPI:  Chief Complaint: had concerns including Chest Pain.      A complete HPI/ROS/PMH/PSH/SH/FH are unobtainable due to: EM_Unobtainable History : None     Chronic or social conditions impacting patient care (Social Determinants of Health): None        Context:  Deann Coon is a 30 y.o. female with a medical history of OCD, schizoaffective disorder, and bipolar affective disorder presents emergency department today complaining of a 4-day history of intermittent chest pain.  Patient describes the pain as somewhat sharp but not terribly intense.  She says its intermittent and denies any specific palliative or provocative factors.  She says it often happens at rest and that also happens with exertion.  She says it seems very random.  She denies any associated shortness of air or diaphoresis.  She denies pleuritic pain.  She denies leg swelling, recent travel, hospitalizations, surgeries, or history of DVT/PE.  She denies exogenous estrogen use.  She says tonight she got up to go to the bathroom and when she sat down to use the bathroom she  describes as feeling like her heart was racing for about a minute.  She did this has happened a few other times in the past.  She is completely asymptomatic at this time.  No known thyroid issues.  She has no cardiac history.        Review of prior external notes (non-ED) -and- Review of prior external test results outside of this encounter:   I reviewed labs from 9/24/2024, hemoglobin 14.8, TSH 1.6     PAST MEDICAL HISTORY       Active Ambulatory Problems     Diagnosis Date Noted    OCD (obsessive compulsive disorder) 09/25/2024    Malnutrition 09/25/2024    Constipation 10/01/2024    Schizoaffective disorder 11/01/2024    Bipolar affective disorder, mixed 11/15/2024    Antipsychotic-induced akathisia 12/04/2024    Insomnia 12/11/2024           Resolved Ambulatory Problems     Diagnosis Date Noted    No Resolved Ambulatory Problems           Past Medical History:   Diagnosis Date    Asthma      Bipolar disorder              PAST SURGICAL HISTORY  Surgical History         Past Surgical History:   Procedure Laterality Date    TONSILLECTOMY                   FAMILY HISTORY  No family history on file.        SOCIAL HISTORY  Social  History   Social History            Socioeconomic History    Marital status: Single   Tobacco Use    Smoking status: Never    Smokeless tobacco: Never   Vaping Use    Vaping status: Never Used   Substance and Sexual Activity    Alcohol use: Yes       Comment: sometimes    Drug use: Not Currently       Types: Marijuana    Sexual activity: Defer               ALLERGIES  Latex        REVIEW OF SYSTEMS  Included in HPI  All systems reviewed and negative except for those discussed in HPI.        PHYSICAL EXAM     I have reviewed the triage vital signs and nursing notes.            ED Triage Vitals [07/02/25 0112]   Temp Heart Rate Resp BP SpO2   98 °F (36.7 °C) 90 16 114/84 100 %       Temp src Heart Rate Source Patient Position BP Location FiO2 (%)   -- -- -- -- --         Physical Exam  Constitutional:       General: She is not in acute distress.     Appearance: Normal appearance.   HENT:      Head: Normocephalic and atraumatic.      Nose: Nose normal.      Mouth/Throat:      Mouth: Mucous membranes are moist.   Eyes:      Extraocular Movements: Extraocular movements intact.      Pupils: Pupils are equal, round, and reactive to light.   Cardiovascular:      Rate and Rhythm: Normal rate and regular rhythm.      Pulses: Normal pulses.      Heart sounds: Normal heart sounds.   Pulmonary:      Effort: Pulmonary effort is normal. No respiratory distress.      Breath sounds: Normal breath sounds. No wheezing, rhonchi or rales.   Abdominal:      General: Abdomen is flat. There is no distension.      Palpations: Abdomen is soft.      Tenderness: There is no abdominal tenderness.   Musculoskeletal:         General: Normal range of motion.      Cervical back: Normal range of motion and neck supple.   Skin:     General: Skin is warm and dry.      Capillary Refill: Capillary refill takes less than 2 seconds.   Neurological:      General: No focal deficit present.      Mental Status: She is alert and oriented to person, place, and  time.   Psychiatric:         Mood and Affect: Mood normal.         Behavior: Behavior normal.               LAB RESULTS  Recent Results         Recent Results (from the past 24 hours)   ECG 12 Lead Chest Pain     Collection Time: 07/02/25  1:26 AM   Result Value Ref Range     QT Interval 379 ms     QTC Interval 427 ms   Comprehensive Metabolic Panel     Collection Time: 07/02/25  1:39 AM     Specimen: Blood   Result Value Ref Range     Glucose 88 65 - 99 mg/dL     BUN 7.0 6.0 - 20.0 mg/dL     Creatinine 0.78 0.57 - 1.00 mg/dL     Sodium 142 136 - 145 mmol/L     Potassium 3.3 (L) 3.5 - 5.2 mmol/L     Chloride 107 98 - 107 mmol/L     CO2 21.7 (L) 22.0 - 29.0 mmol/L     Calcium 9.7 8.6 - 10.5 mg/dL     Total Protein 7.5 6.0 - 8.5 g/dL     Albumin 4.7 3.5 - 5.2 g/dL     ALT (SGPT) 16 1 - 33 U/L     AST (SGOT) 21 1 - 32 U/L     Alkaline Phosphatase 47 39 - 117 U/L     Total Bilirubin 0.6 0.0 - 1.2 mg/dL     Globulin 2.8 gm/dL     A/G Ratio 1.7 g/dL     BUN/Creatinine Ratio 9.0 7.0 - 25.0     Anion Gap 13.3 5.0 - 15.0 mmol/L     eGFR 104.3 >60.0 mL/min/1.73   High Sensitivity Troponin T     Collection Time: 07/02/25  1:39 AM     Specimen: Blood   Result Value Ref Range     HS Troponin T <6 <14 ng/L   Green Top (Gel)     Collection Time: 07/02/25  1:39 AM   Result Value Ref Range     Extra Tube Hold for add-ons.     Lavender Top     Collection Time: 07/02/25  1:39 AM   Result Value Ref Range     Extra Tube hold for add-on     Light Blue Top     Collection Time: 07/02/25  1:39 AM   Result Value Ref Range     Extra Tube Hold for add-ons.     CBC Auto Differential     Collection Time: 07/02/25  1:39 AM     Specimen: Blood   Result Value Ref Range     WBC 3.10 (L) 3.40 - 10.80 10*3/mm3     RBC 4.91 3.77 - 5.28 10*6/mm3     Hemoglobin 14.8 12.0 - 15.9 g/dL     Hematocrit 45.2 34.0 - 46.6 %     MCV 92.1 79.0 - 97.0 fL     MCH 30.1 26.6 - 33.0 pg     MCHC 32.7 31.5 - 35.7 g/dL     RDW 11.8 (L) 12.3 - 15.4 %     RDW-SD 40.6 37.0 -  54.0 fl     MPV 11.0 6.0 - 12.0 fL     Platelets 227 140 - 450 10*3/mm3     Neutrophil % 40.9 (L) 42.7 - 76.0 %     Lymphocyte % 48.4 (H) 19.6 - 45.3 %     Monocyte % 7.1 5.0 - 12.0 %     Eosinophil % 2.3 0.3 - 6.2 %     Basophil % 1.0 0.0 - 1.5 %     Immature Grans % 0.3 0.0 - 0.5 %     Neutrophils, Absolute 1.27 (L) 1.70 - 7.00 10*3/mm3     Lymphocytes, Absolute 1.50 0.70 - 3.10 10*3/mm3     Monocytes, Absolute 0.22 0.10 - 0.90 10*3/mm3     Eosinophils, Absolute 0.07 0.00 - 0.40 10*3/mm3     Basophils, Absolute 0.03 0.00 - 0.20 10*3/mm3     Immature Grans, Absolute 0.01 0.00 - 0.05 10*3/mm3     nRBC 0.0 0.0 - 0.2 /100 WBC   hCG, Serum, Qualitative     Collection Time: 07/02/25  1:39 AM     Specimen: Blood   Result Value Ref Range     HCG Qualitative Negative Negative   Magnesium     Collection Time: 07/02/25  1:39 AM     Specimen: Blood   Result Value Ref Range     Magnesium 2.1 1.6 - 2.6 mg/dL   TSH     Collection Time: 07/02/25  1:39 AM     Specimen: Blood   Result Value Ref Range     TSH 5.550 (H) 0.270 - 4.200 uIU/mL   T4, Free     Collection Time: 07/02/25  1:39 AM     Specimen: Blood   Result Value Ref Range     Free T4 1.56 0.92 - 1.68 ng/dL   High Sensitivity Troponin T 1Hr     Collection Time: 07/02/25  2:50 AM     Specimen: Blood   Result Value Ref Range     HS Troponin T <6 <14 ng/L     Troponin T Numeric Delta                   RADIOLOGY  XR Chest 1 View  Result Date: 7/2/2025  CXR ONE VIEW  HISTORY: Chest Pain Protocol  COMPARISON: 9/18/2024  TECHNIQUE: single portable AP        The heart size is within normal limits.  The lungs are normally aerated. There is no pleural effusion or pneumothorax.    This report was finalized on 7/2/2025 1:56 AM by Dr. Mark Burks M.D on Workstation: [id number]             MEDICATIONS GIVEN IN ER  Medications   sodium chloride 0.9 % flush 10 mL (has no administration in time range)   aspirin chewable tablet 324 mg (324 mg Oral Given 7/2/25 0128)                OUTPATIENT MEDICATION MANAGEMENT:  Current Medications and Prescriptions Ordered in Epic             Current Facility-Administered Medications Ordered in Epic   Medication Dose Route Frequency Provider Last Rate Last Admin    sodium chloride 0.9 % flush 10 mL  10 mL Intravenous PRN Cora Vicente PA-C                 Current Outpatient Medications Ordered in Epic   Medication Sig Dispense Refill    Cariprazine HCl 6 MG capsule Take 1 capsule by mouth Daily. 30 capsule 1    fluvoxaMINE (LUVOX) 100 MG tablet Take 1 tablet by mouth Every Night for 60 days. 60 tablet 0    hydrOXYzine (ATARAX) 25 MG tablet Take 2 tablets by mouth 3 (Three) Times a Day As Needed for Itching. Take 1-2 tablets by mouth 3 times per day as needed for anxiety. 30 tablet 2    multivitamin with minerals tablet tablet Take 1 tablet by mouth Daily. 120 tablet 2    polyethylene glycol (MIRALAX) 17 GM/SCOOP powder Take 17 g (1 CAPFUL) by mouth Daily As Needed (constipation). 238 g 0               PROGRESS, DATA ANALYSIS, CONSULTS, AND MEDICAL DECISION MAKING  ORDERS PLACED DURING THIS VISIT:      Orders Placed This Encounter   Procedures    XR Chest 1 View    [place] Draw    Comprehensive Metabolic Panel    High Sensitivity Troponin T    CBC Auto Differential    hCG, Serum, Qualitative    Magnesium    TSH    T4, Free    High Sensitivity Troponin T 1Hr    Continuous Pulse Oximetry    Vital Signs    Oxygen Therapy- Nasal Cannula; Titrate 1-6 LPM Per SpO2; 90 - 95%    Holter Monitor - 72 Hour Up To 15 Days    ECG 12 Lead Chest Pain    ECG 12 Lead Chest Pain    Insert Peripheral IV    CBC & Differential    Green Top (Gel)    Lavender Top    Light Blue Top         All labs have been independently interpreted by me.  All radiology studies have been reviewed by me. All EKG's have been independently viewed and interpreted by me.  Discussion below represents my analysis of pertinent findings related to patient's condition, differential diagnosis,  treatment plan and final disposition.     Differential diagnosis includes but is not limited to:   My differential diagnosis for palpitations includes but is not limited to     Arrhythmias  Atrial fibrillation/flutter  Bradycardia caused by advanced arteriovenous  block or sinus node dysfunction  Bradycardia-tachycardia syndrome (sick sinus syndrome)  Multifocal atrial tachycardia  Premature supraventricular or ventricular contractions  Sinus tachycardia or arrhythmia  Supraventricular tachycardia  Ventricular tachycardia  Shanice-Parkinson-White syndrome     Psychiatric causes  Anxiety disorder  Panic attacks  Drugs and medications  Alcohol  Caffeine  Certain prescription and over-the-counter agents (e.g., digitalis, phenothiazine, theophylline, beta agonists)  Street drugs (e.g., cocaine)  Tobacco     Nonarrhythmic cardiac causes  Atrial or ventricular septal defect  Cardiomyopathy  Congenital heart disease  Congestive heart failure  Mitral valve prolapse  Pacemaker-mediated tachycardia  Pericarditis  Valvular disease (e.g., aortic insufficiency, stenosis)     Extracardiac causes  Anemia  Electrolyte imbalance  Fever  Hyperthyroidism  Hypoglycemia  Hypovolemia  Pheochromocytoma  Pulmonary disease  Vasovagal syndrome           ED Course:      ED Course as of 07/02/25 0439   Wed Jul 02, 2025   0118 I discussed the case with Dr. Savage and she agrees to evaluate the patient at the bedside.    [CC]   0217 WBC(!): 3.10 [CC]   0217 Hemoglobin: 14.8 [CC]   0217 HCG Qualitative: Negative [CC]   0217 Magnesium: 2.1 [CC]   0217 XR Chest 1 View  My independent interpretation of chest x-ray is no acute infiltrate [CC]   0224 HS Troponin T: <6 [CC]   0237 TSH Baseline(!): 5.550 [CC]   0237 Free T4: 1.56 [CC]   0317 HS Troponin T: <6 [CC]   0352 I rechecked the patient.  She has remained asymptomatic while here in the emergency department.  I discussed the patient's labs, radiology findings (including all incidental findings),  diagnosis, and plan for discharge.  Will discharge home with a Holter monitor.  A repeat exam reveals no new worrisome changes from my initial exam findings.  The patient understands that the fact that they are being discharged does not denote that nothing is abnormal, it indicates that no clinical emergency is present and that they must follow-up as directed in order to properly maintain their health.  Follow-up instructions (specifically listed below) and return to ER precautions were given at this time.  I specifically instructed the patient to follow-up with their PCP.  The patient understands and agrees with the plan, and is ready for discharge.  All questions answered. [CC]       ED Course User Index  [CC] Cora Vicente, TRAVIS               AS OF 04:39 EDT VITALS:     BP - 97/67  HR - 70  TEMP - 98 °F (36.7 °C)  O2 SATS - 98%     Clinical Scores:   HEART Score: 0     PERC Rule for Pulmonary Embolism - MDCalc  Calculated on Jul 02 2025 4:40 AM  0 criteria -> No need for further workup, as <2% chance of PE. If no criteria are positive and clinician’s pre-test probability is <15%, PERC Rule criteria are satisfied.     MDM:  Patient is a well-appearing 30-year-old female presents emergency department today after an episode of palpitations and atypical chest pain.  On arrival here in the emergency department vitals are reassuring, she is afebrile.  On my exam patient is well-appearing, cardiopulmonary exam is benign.     Patient was evaluated with EKG which is nonischemic in nature and shows no concerning arrhythmia.  She had serial negative troponins and given clinical presentation and evaluation here in the ED I do not suspect ACS.  Her heart score is 0.     Also consider pulmonary embolism as possible source of her symptoms but she is PERC negative.  I do not suspect PE based on presentation today.     Chest x-ray shows no concerning cardiopulmonary findings. Additionally, her TSH was mildly elevated but  her free T4 was normal.  I encouraged her to follow this up with her primary care on an outpatient basis.     Patient has had multiple episodes of palpitations although they have been very brief and nonsustained believe she would benefit from a Zio patch to monitor for palpitations and can be further evaluated on an outpatient basis.  No emergent admission is indicated at this time.  Patient is agreeable to this plan and will follow-up with cardiology.  Return precautions were given.  She will be discharged with outpatient follow-up.        DIAGNOSIS  Final diagnoses:   Palpitations   Atypical chest pain            DISPOSITION  ED Disposition         ED Disposition   Discharge    Condition   Stable    Comment   --                         Please note that portions of this document were completed with a voice recognition program.     Note Disclaimer: At [facility], we believe that sharing information builds trust and better relationships. You are receiving this note because you recently visited [facility]. It is possible you will see health information before a provider has talked with you about it. This kind of information can be easy to misunderstand. To help you fully understand what it means for your health, we urge you to discuss this note with your provider.     Cora Vicente PA-C  07/02/25 0442           ED Provider Notes  Stacie Savage MD (Physician)  Emergency Medicine  Expand All Collapse All     I have personally performed a face-to-face diagnostic evaluation of the patient.  I have reviewed and agree with the care plan as outlined by NP/PA.  My findings are as follows:     HPI:  Patient is a 30 y.o. female who presents with a 4-day history of chest pain and some intermittent palpitations.  She often feels like her heart is racing.  She has not had any associated shortness of breath, leg swelling.  No recent travel.  No personal history of VTE.  No fever, cough or congestion.         PE:  Physical Exam  Constitutional:       Appearance: Normal appearance.   HENT:      Head: Normocephalic and atraumatic.   Eyes:      Pupils: Pupils are equal, round, and reactive to light.   Cardiovascular:      Rate and Rhythm: Normal rate and regular rhythm.      Heart sounds: No murmur heard.  Musculoskeletal:      Right lower leg: No edema.      Left lower leg: No edema.   Skin:     General: Skin is warm.   Neurological:      Mental Status: She is alert and oriented to person, place, and time.               MDM:  I provided a substantiate portion of the care of this patient. I personally performed the medical decision making.     Medical records are reviewed in [facility] and Care Everywhere, if applicable     EKG Interpreted by me: Normal sinus rhythm, normal axis, no ST segment changes     Recent Results         Recent Results (from the past 24 hours)   ECG 12 Lead Chest Pain     Collection Time: 07/02/25  1:26 AM   Result Value Ref Range     QT Interval 379 ms     QTC Interval 427 ms   Comprehensive Metabolic Panel     Collection Time: 07/02/25  1:39 AM     Specimen: Blood   Result Value Ref Range     Glucose 88 65 - 99 mg/dL     BUN 7.0 6.0 - 20.0 mg/dL     Creatinine 0.78 0.57 - 1.00 mg/dL     Sodium 142 136 - 145 mmol/L     Potassium 3.3 (L) 3.5 - 5.2 mmol/L     Chloride 107 98 - 107 mmol/L     CO2 21.7 (L) 22.0 - 29.0 mmol/L     Calcium 9.7 8.6 - 10.5 mg/dL     Total Protein 7.5 6.0 - 8.5 g/dL     Albumin 4.7 3.5 - 5.2 g/dL     ALT (SGPT) 16 1 - 33 U/L     AST (SGOT) 21 1 - 32 U/L     Alkaline Phosphatase 47 39 - 117 U/L     Total Bilirubin 0.6 0.0 - 1.2 mg/dL     Globulin 2.8 gm/dL     A/G Ratio 1.7 g/dL     BUN/Creatinine Ratio 9.0 7.0 - 25.0     Anion Gap 13.3 5.0 - 15.0 mmol/L     eGFR 104.3 >60.0 mL/min/1.73   High Sensitivity Troponin T     Collection Time: 07/02/25  1:39 AM     Specimen: Blood   Result Value Ref Range     HS Troponin T <6 <14 ng/L   Green Top (Gel)     Collection Time: 07/02/25  1:39  AM   Result Value Ref Range     Extra Tube Hold for add-ons.     Lavender Top     Collection Time: 07/02/25  1:39 AM   Result Value Ref Range     Extra Tube hold for add-on     Light Blue Top     Collection Time: 07/02/25  1:39 AM   Result Value Ref Range     Extra Tube Hold for add-ons.     CBC Auto Differential     Collection Time: 07/02/25  1:39 AM     Specimen: Blood   Result Value Ref Range     WBC 3.10 (L) 3.40 - 10.80 10*3/mm3     RBC 4.91 3.77 - 5.28 10*6/mm3     Hemoglobin 14.8 12.0 - 15.9 g/dL     Hematocrit 45.2 34.0 - 46.6 %     MCV 92.1 79.0 - 97.0 fL     MCH 30.1 26.6 - 33.0 pg     MCHC 32.7 31.5 - 35.7 g/dL     RDW 11.8 (L) 12.3 - 15.4 %     RDW-SD 40.6 37.0 - 54.0 fl     MPV 11.0 6.0 - 12.0 fL     Platelets 227 140 - 450 10*3/mm3     Neutrophil % 40.9 (L) 42.7 - 76.0 %     Lymphocyte % 48.4 (H) 19.6 - 45.3 %     Monocyte % 7.1 5.0 - 12.0 %     Eosinophil % 2.3 0.3 - 6.2 %     Basophil % 1.0 0.0 - 1.5 %     Immature Grans % 0.3 0.0 - 0.5 %     Neutrophils, Absolute 1.27 (L) 1.70 - 7.00 10*3/mm3     Lymphocytes, Absolute 1.50 0.70 - 3.10 10*3/mm3     Monocytes, Absolute 0.22 0.10 - 0.90 10*3/mm3     Eosinophils, Absolute 0.07 0.00 - 0.40 10*3/mm3     Basophils, Absolute 0.03 0.00 - 0.20 10*3/mm3     Immature Grans, Absolute 0.01 0.00 - 0.05 10*3/mm3     nRBC 0.0 0.0 - 0.2 /100 WBC   hCG, Serum, Qualitative     Collection Time: 07/02/25  1:39 AM     Specimen: Blood   Result Value Ref Range     HCG Qualitative Negative Negative   Magnesium     Collection Time: 07/02/25  1:39 AM     Specimen: Blood   Result Value Ref Range     Magnesium 2.1 1.6 - 2.6 mg/dL   TSH     Collection Time: 07/02/25  1:39 AM     Specimen: Blood   Result Value Ref Range     TSH 5.550 (H) 0.270 - 4.200 uIU/mL   T4, Free     Collection Time: 07/02/25  1:39 AM     Specimen: Blood   Result Value Ref Range     Free T4 1.56 0.92 - 1.68 ng/dL   High Sensitivity Troponin T 1Hr     Collection Time: 07/02/25  2:50 AM     Specimen: Blood    Result Value Ref Range     HS Troponin T <6 <14 ng/L     Troponin T Numeric Delta             I have reviewed the above labs     XR Chest 1 View  Result Date: 7/2/2025  CXR ONE VIEW  HISTORY: Chest Pain Protocol  COMPARISON: 9/18/2024  TECHNIQUE: single portable AP        The heart size is within normal limits.  The lungs are normally aerated. There is no pleural effusion or pneumothorax.    This report was finalized on 7/2/2025 1:56 AM by Dr. Mark Burks M.D on Workstation: [id number]          My independent interpretation of the cxr: No acute process     Overall well-appearing 30-year-old female who is presenting with complaints of palpitations and atypical chest pain.  She overall looks well.  She presents afebrile with unremarkable vital signs.  Cardiopulmonary exam is normal.     She was evaluated with an EKG.  Does not demonstrate any evidence of arrhythmia or other acute ischemic changes.  She has had serially negative troponin levels and exam and history is not consistent with ACS.  HEART Score: 0     She furthermore satisfies PERC.  I do not suspect PE today  PERC Rule for Pulmonary Embolism - MDCalc  Calculated on Jul 02 2025 3:56 AM  0 criteria -> No need for further workup, as <2% chance of PE. If no criteria are positive and clinician’s pre-test probability is <15%, PERC Rule criteria are satisfied.     Chest x-ray did not demonstrate any other acute cardiopulmonary process as emergent cause of symptoms including pneumomediastinum, widened mediastinum, acute infiltrate or pneumothorax.     Will be appropriate for discharge from the emergency department.  Will place a Zio patch number to follow-up in the outpatient setting for reevaluation and further testing.     Impression:  Final diagnoses:   Palpitations   Atypical chest pain            Disposition:  ED Disposition         ED Disposition   Discharge    Condition   Stable    Comment   --                   Stacie Savage MD  07/02/25 0356         Stacie Savage MD  07/02/25 0356            History of Present Illness  The patient is a 30-year-old female who presents for follow-up after being seen at the hospital for palpitations and was found to have elevated TSH levels. She has not been seen for over 7 years by me. She is a known bipolar schizophrenia patient who sees Dr. Doug Cortes for psychiatric purposes.    She experienced an episode of rapid heart rate, which led to her visit to the ER. Although the severity of her symptoms has decreased, she continues to experience them. She reports no leg or ankle swelling. She has been experiencing these episodes for less than a year, both at rest and during exertion. She also reports chest pain.     She has been experiencing lightheadedness, which has caused her to feel disoriented and nervous about using stairs and driving. These symptoms can occur even while she is sitting. She has been working as a [employer]  but has been hesitant to work due to this lightheadedness. She has been dealing with anxiety, which has exacerbated her lightheadedness. She plans to discuss this with Dr. Cortes in the coming days. She does not believe her medication is worsening her condition.    She has been experiencing diarrhea and loose stools. Her menstrual cycles are regular.    She is uncertain about the date of her last tetanus shot and prefers to postpone it. It has been a significant amount of time since her last Pap smear. She typically receives her influenza vaccine in the fall.    She has been inpatient for psychiatric issues under Dr. Cortes's care. Her Luvox dosage was increased to 150 mg less than a week ago.    Social History:  Occupations: [employer]   Diet: Small meals every 2 to 3 hours  Living Condition: Living with parents  Current outpatient and discharge medications have been reconciled for the patient.  Reviewed by: Jonathan Saleh MD      Review of Systems    Objective   Physical  Exam  Vitals reviewed.   Constitutional:       Appearance: Normal appearance.   HENT:      Head: Normocephalic and atraumatic.   Eyes:      Extraocular Movements: Extraocular movements intact.      Conjunctiva/sclera: Conjunctivae normal.      Pupils: Pupils are equal, round, and reactive to light.   Cardiovascular:      Rate and Rhythm: Normal rate and regular rhythm.      Pulses: Normal pulses.      Heart sounds: Normal heart sounds.   Pulmonary:      Effort: Pulmonary effort is normal.      Breath sounds: Normal breath sounds.   Abdominal:      General: Bowel sounds are normal.      Palpations: Abdomen is soft.   Musculoskeletal:         General: Normal range of motion.      Cervical back: Normal range of motion and neck supple.   Skin:     General: Skin is warm and dry.   Neurological:      General: No focal deficit present.      Mental Status: She is alert. Mental status is at baseline.         Assessment & Plan     Assessment & Plan  1. Tachycardia.  - Her TSH levels were elevated, indicating hypothyroidism. Previous TSH levels have fluctuated between normal and abnormal over the past 7 years.  - Her potassium levels were low, which could be due to diarrhea or loose stools. She was likely anxious during her ER visit, which could have contributed to her symptoms. Her white blood cell count was slightly low, but there was no evidence of anemia. Her EKG, troponin, and chest x-ray were all normal. The Holter monitor showed an average heart rate of 75, with no PACs, arrhythmias, or SVT. A 12-lead EKG was also normal. Her heart rate is currently normal.  - A comprehensive set of lab tests will be ordered to check her iron, B12, thyroid, cholesterol, magnesium, and potassium levels.  - A referral to cardiology will be made for further evaluation, including a possible echocardiogram and extended Zio patch monitoring. If her TSH remains elevated, she will need to start medication.    2. Lightheadedness.  - She reports  experiencing lightheadedness, which may be related to her anxiety or low potassium levels.  - Her potassium levels were low, which could be due to diarrhea or loose stools. She was likely anxious during her ER visit, which could have contributed to her symptoms.  - She is advised to follow up with Dr. Cortes regarding her anxiety management.  - The comprehensive lab tests will help determine if there are any underlying causes that need to be addressed.    3. Hypokalemia.  - Her potassium levels were found to be low, which could be due to diarrhea or dietary factors.  - Her potassium levels were low, which could be due to diarrhea or loose stools.  - A recheck of her potassium levels will be included in the comprehensive lab tests.  - If her potassium levels remain low, dietary modifications or supplements may be considered.    4. Health Maintenance.  - She needs a Tdap vaccine and a Pap smear.  - Hepatitis C screening will be included in the blood work.  - The comprehensive lab tests will include a full thyroid panel, cholesterol, and magnesium levels.  - She is advised to follow up with her gynecologist for a Pap smear.    Follow-up: 07/28/2025.             Patient or patient representative verbalized consent for the use of Ambient Listening during the visit with  Jonathan Saleh MD for chart documentation. 7/24/2025  10:29 EDT

## 2025-07-25 RX ORDER — PROPRANOLOL HYDROCHLORIDE 10 MG/1
10 TABLET ORAL 2 TIMES DAILY PRN
Qty: 60 TABLET | Refills: 0 | Status: SHIPPED | OUTPATIENT
Start: 2025-07-25

## 2025-07-25 NOTE — TELEPHONE ENCOUNTER
Contacted patient who reports persistent anxiety directly related to various physical signs/symptoms reported by the patient.  More specifically, patient remains highly concerned and fixated on potential cardiac issues/abnormalities despite recently undergoing evaluation at the emergency department and undergoing prolonged Holter monitor testing both of which ruled out any significant cardiac abnormalities or issues.  This was once again emphasized with the patient at today's visit and it was explained that I do not feel as if this persistent anxiety is in any way related to recently increased fluvoxamine.  If anything, we may discuss further increasing fluvoxamine in the near future if the symptoms persist.  In the meantime, I do recommend initiation of low-dose propranolol in hopes of addressing patient's persistent anxiety which I do feel will help with patient's physical symptoms as well.  Patient does report previous trials of propranolol associated with a perceived drop in her blood pressure, and as such she was advised to monitor her blood pressure regularly after initiating this as needed medication.  A prescription for propranolol 10 mg p.o. twice daily as needed for acute anxiety will be sent to patient's pharmacy.  Patient voices understanding of this plan.

## 2025-07-28 ENCOUNTER — RESULTS FOLLOW-UP (OUTPATIENT)
Dept: INTERNAL MEDICINE | Facility: CLINIC | Age: 30
End: 2025-07-28
Payer: COMMERCIAL

## 2025-07-28 NOTE — TELEPHONE ENCOUNTER
"Relay   Lmtcb x2  \"All labs are normal, except low A1c/glucose. Recommend nutritious diet. Thyroid numbers are normal. Cholesterol is also normal. See no medical reasons for tachycardia. \"        "

## 2025-07-29 NOTE — TELEPHONE ENCOUNTER
Name: Deann Coon    Relationship: Self    Best Callback Number:     HUB PROVIDED THE RELAY MESSAGE FROM THE OFFICE   PATIENT HAS FURTHER QUESTIONS AND WOULD LIKE A CALL BACK    ADDITIONAL INFORMATION:   PATIENT STATES FOR THE PAST 4 WEEKS, SHE HAS BEEN EATING HEALTHIER THAN SHE HAS PREVIOUSLY.  SHE STATES HER GLUCOSE WAS STILL LOW, SO SHE IS WANTS TO KNOW IF SHE SHOULD BE EATING MORE OR IF SHE IS NOT EATING HEALTHY ENOUGH.    PATIENT FURTHER STATES THAT HER BLOOD PRESSURE WAS NORMAL UPON HER VISIT, HOWEVER, SHE FEELS LIKE SHE HAS ISSUES WITH HER HEART SPEEDING UP WHICH WAS HER MAIN CONCERN AT HER APPT.   SHE STATES THE PAST 3 DAYS, HER BLOOD PRESSURE HAS BEEN REALLY LOW LIKE 84/43, 96/52 AND WHEN SHE SLEEPS SHE STATES SHE GETS A JOLT THAT WAKES HER UP, AND THEN HER HEART WILL SPEED UP.     SHE STATES HER PSYCHIATRIST HAS ALSO NOTED SHE HAS LOW BLOOD PRESSURE, SO SHE IS WONDERING IF HER ISSUE IS ACTUALLY HAVING LOW BLOOD PRESSURE THAT IS NOT GETTING CAUGHT WHEN SHE HAS OFFICE VISITS.  PLEASE ADDRESS WITH PATIENT.

## 2025-08-04 ENCOUNTER — OFFICE VISIT (OUTPATIENT)
Dept: INTERNAL MEDICINE | Facility: CLINIC | Age: 30
End: 2025-08-04
Payer: COMMERCIAL

## 2025-08-04 VITALS
DIASTOLIC BLOOD PRESSURE: 64 MMHG | WEIGHT: 127 LBS | OXYGEN SATURATION: 99 % | HEART RATE: 84 BPM | SYSTOLIC BLOOD PRESSURE: 100 MMHG | HEIGHT: 65 IN | BODY MASS INDEX: 21.16 KG/M2 | RESPIRATION RATE: 16 BRPM

## 2025-08-04 DIAGNOSIS — L98.9 LESION OF NECK: Primary | ICD-10-CM

## 2025-08-04 PROCEDURE — 99213 OFFICE O/P EST LOW 20 MIN: CPT | Performed by: INTERNAL MEDICINE

## 2025-08-06 ENCOUNTER — APPOINTMENT (OUTPATIENT)
Dept: GENERAL RADIOLOGY | Facility: HOSPITAL | Age: 30
End: 2025-08-06
Payer: COMMERCIAL

## 2025-08-06 ENCOUNTER — HOSPITAL ENCOUNTER (EMERGENCY)
Facility: HOSPITAL | Age: 30
Discharge: HOME | End: 2025-08-06
Attending: EMERGENCY MEDICINE
Payer: COMMERCIAL

## 2025-08-06 ENCOUNTER — TELEPHONE (OUTPATIENT)
Dept: CARDIOLOGY | Age: 30
End: 2025-08-06
Payer: COMMERCIAL

## 2025-08-06 ENCOUNTER — HOSPITAL ENCOUNTER (EMERGENCY)
Facility: HOSPITAL | Age: 30
Discharge: HOME | End: 2025-08-06
Attending: EMERGENCY MEDICINE | Admitting: EMERGENCY MEDICINE
Payer: COMMERCIAL

## 2025-08-06 VITALS
HEART RATE: 101 BPM | BODY MASS INDEX: 19.93 KG/M2 | SYSTOLIC BLOOD PRESSURE: 99 MMHG | RESPIRATION RATE: 18 BRPM | WEIGHT: 124 LBS | HEIGHT: 66 IN | OXYGEN SATURATION: 97 % | TEMPERATURE: 98 F | DIASTOLIC BLOOD PRESSURE: 74 MMHG

## 2025-08-06 VITALS
SYSTOLIC BLOOD PRESSURE: 108 MMHG | HEIGHT: 62 IN | HEART RATE: 83 BPM | TEMPERATURE: 98 F | BODY MASS INDEX: 22.82 KG/M2 | WEIGHT: 124 LBS | OXYGEN SATURATION: 100 % | RESPIRATION RATE: 17 BRPM | DIASTOLIC BLOOD PRESSURE: 74 MMHG

## 2025-08-06 DIAGNOSIS — R07.9 CHEST PAIN, UNSPECIFIED TYPE: Primary | ICD-10-CM

## 2025-08-06 DIAGNOSIS — R31.21 ASYMPTOMATIC MICROSCOPIC HEMATURIA: Primary | ICD-10-CM

## 2025-08-06 LAB
ALBUMIN SERPL-MCNC: 4.3 G/DL (ref 3.5–5.2)
ALBUMIN/GLOB SERPL: 1.7 G/DL
ALP SERPL-CCNC: 53 U/L (ref 39–117)
ALT SERPL W P-5'-P-CCNC: 26 U/L (ref 1–33)
ANION GAP SERPL CALCULATED.3IONS-SCNC: 10.4 MMOL/L (ref 5–15)
AST SERPL-CCNC: 22 U/L (ref 1–32)
BACTERIA UR QL AUTO: ABNORMAL /HPF
BASOPHILS # BLD AUTO: 0.04 10*3/MM3 (ref 0–0.2)
BASOPHILS NFR BLD AUTO: 1 % (ref 0–1.5)
BILIRUB SERPL-MCNC: 0.4 MG/DL (ref 0–1.2)
BILIRUB UR QL STRIP: NEGATIVE
BUN SERPL-MCNC: 12 MG/DL (ref 6–20)
BUN/CREAT SERPL: 18.5 (ref 7–25)
CALCIUM SPEC-SCNC: 9.5 MG/DL (ref 8.6–10.5)
CHLORIDE SERPL-SCNC: 107 MMOL/L (ref 98–107)
CLARITY UR: ABNORMAL
CO2 SERPL-SCNC: 22.6 MMOL/L (ref 22–29)
COLOR UR: YELLOW
CREAT SERPL-MCNC: 0.65 MG/DL (ref 0.57–1)
DEPRECATED RDW RBC AUTO: 41.9 FL (ref 37–54)
EGFRCR SERPLBLD CKD-EPI 2021: 120.9 ML/MIN/1.73
EOSINOPHIL # BLD AUTO: 0.09 10*3/MM3 (ref 0–0.4)
EOSINOPHIL NFR BLD AUTO: 2.2 % (ref 0.3–6.2)
ERYTHROCYTE [DISTWIDTH] IN BLOOD BY AUTOMATED COUNT: 12.2 % (ref 12.3–15.4)
GLOBULIN UR ELPH-MCNC: 2.6 GM/DL
GLUCOSE SERPL-MCNC: 85 MG/DL (ref 65–99)
GLUCOSE UR STRIP-MCNC: NEGATIVE MG/DL
HCT VFR BLD AUTO: 44.7 % (ref 34–46.6)
HGB BLD-MCNC: 14.9 G/DL (ref 12–15.9)
HGB UR QL STRIP.AUTO: ABNORMAL
HOLD SPECIMEN: NORMAL
HYALINE CASTS UR QL AUTO: ABNORMAL /LPF
IMM GRANULOCYTES # BLD AUTO: 0 10*3/MM3 (ref 0–0.05)
IMM GRANULOCYTES NFR BLD AUTO: 0 % (ref 0–0.5)
KETONES UR QL STRIP: NEGATIVE
LEUKOCYTE ESTERASE UR QL STRIP.AUTO: NEGATIVE
LYMPHOCYTES # BLD AUTO: 1.66 10*3/MM3 (ref 0.7–3.1)
LYMPHOCYTES NFR BLD AUTO: 40.3 % (ref 19.6–45.3)
MCH RBC QN AUTO: 30.8 PG (ref 26.6–33)
MCHC RBC AUTO-ENTMCNC: 33.3 G/DL (ref 31.5–35.7)
MCV RBC AUTO: 92.5 FL (ref 79–97)
MONOCYTES # BLD AUTO: 0.34 10*3/MM3 (ref 0.1–0.9)
MONOCYTES NFR BLD AUTO: 8.3 % (ref 5–12)
NEUTROPHILS NFR BLD AUTO: 1.99 10*3/MM3 (ref 1.7–7)
NEUTROPHILS NFR BLD AUTO: 48.2 % (ref 42.7–76)
NITRITE UR QL STRIP: NEGATIVE
NRBC BLD AUTO-RTO: 0 /100 WBC (ref 0–0.2)
PH UR STRIP.AUTO: 7 [PH] (ref 5–8)
PLATELET # BLD AUTO: 198 10*3/MM3 (ref 140–450)
PMV BLD AUTO: 11.7 FL (ref 6–12)
POTASSIUM SERPL-SCNC: 4 MMOL/L (ref 3.5–5.2)
PROT SERPL-MCNC: 6.9 G/DL (ref 6–8.5)
PROT UR QL STRIP: NEGATIVE
QT INTERVAL: 398 MS
QTC INTERVAL: 446 MS
RBC # BLD AUTO: 4.83 10*6/MM3 (ref 3.77–5.28)
RBC # UR STRIP: ABNORMAL /HPF
REF LAB TEST METHOD: ABNORMAL
SODIUM SERPL-SCNC: 140 MMOL/L (ref 136–145)
SP GR UR STRIP: 1.01 (ref 1–1.03)
SQUAMOUS #/AREA URNS HPF: ABNORMAL /HPF
TROPONIN T SERPL HS-MCNC: <6 NG/L
TSH SERPL DL<=0.05 MIU/L-ACNC: 3.58 UIU/ML (ref 0.27–4.2)
UROBILINOGEN UR QL STRIP: ABNORMAL
WBC # UR STRIP: ABNORMAL /HPF
WBC NRBC COR # BLD AUTO: 4.12 10*3/MM3 (ref 3.4–10.8)
WHOLE BLOOD HOLD COAG: NORMAL
WHOLE BLOOD HOLD SPECIMEN: NORMAL

## 2025-08-06 PROCEDURE — 99284 EMERGENCY DEPT VISIT MOD MDM: CPT

## 2025-08-06 PROCEDURE — 80053 COMPREHEN METABOLIC PANEL: CPT | Performed by: EMERGENCY MEDICINE

## 2025-08-06 PROCEDURE — 93005 ELECTROCARDIOGRAM TRACING: CPT

## 2025-08-06 PROCEDURE — 99283 EMERGENCY DEPT VISIT LOW MDM: CPT | Performed by: EMERGENCY MEDICINE

## 2025-08-06 PROCEDURE — 93005 ELECTROCARDIOGRAM TRACING: CPT | Performed by: EMERGENCY MEDICINE

## 2025-08-06 PROCEDURE — 81001 URINALYSIS AUTO W/SCOPE: CPT | Performed by: EMERGENCY MEDICINE

## 2025-08-06 PROCEDURE — 84484 ASSAY OF TROPONIN QUANT: CPT | Performed by: EMERGENCY MEDICINE

## 2025-08-06 PROCEDURE — 71045 X-RAY EXAM CHEST 1 VIEW: CPT

## 2025-08-06 PROCEDURE — 99284 EMERGENCY DEPT VISIT MOD MDM: CPT | Performed by: EMERGENCY MEDICINE

## 2025-08-06 PROCEDURE — 85025 COMPLETE CBC W/AUTO DIFF WBC: CPT | Performed by: EMERGENCY MEDICINE

## 2025-08-06 PROCEDURE — 84443 ASSAY THYROID STIM HORMONE: CPT | Performed by: EMERGENCY MEDICINE

## 2025-08-06 PROCEDURE — 93010 ELECTROCARDIOGRAM REPORT: CPT | Performed by: INTERNAL MEDICINE

## 2025-08-06 RX ORDER — SODIUM CHLORIDE 0.9 % (FLUSH) 0.9 %
10 SYRINGE (ML) INJECTION AS NEEDED
Status: DISCONTINUED | OUTPATIENT
Start: 2025-08-06 | End: 2025-08-06 | Stop reason: HOSPADM

## 2025-08-06 RX ORDER — ASPIRIN 325 MG
325 TABLET ORAL ONCE
Status: DISCONTINUED | OUTPATIENT
Start: 2025-08-06 | End: 2025-08-06

## 2025-08-07 LAB
QT INTERVAL: 376 MS
QTC INTERVAL: 437 MS

## 2025-08-08 ENCOUNTER — OFFICE VISIT (OUTPATIENT)
Dept: CARDIOLOGY | Age: 30
End: 2025-08-08
Payer: COMMERCIAL

## 2025-08-08 VITALS
BODY MASS INDEX: 22.82 KG/M2 | HEIGHT: 62 IN | WEIGHT: 124 LBS | DIASTOLIC BLOOD PRESSURE: 68 MMHG | HEART RATE: 89 BPM | SYSTOLIC BLOOD PRESSURE: 90 MMHG | OXYGEN SATURATION: 99 %

## 2025-08-08 DIAGNOSIS — R06.02 SHORT OF BREATH ON EXERTION: Primary | ICD-10-CM

## 2025-08-08 DIAGNOSIS — G25.71 ANTIPSYCHOTIC-INDUCED AKATHISIA: ICD-10-CM

## 2025-08-08 DIAGNOSIS — T43.505A ANTIPSYCHOTIC-INDUCED AKATHISIA: ICD-10-CM

## 2025-08-11 ENCOUNTER — OFFICE VISIT (OUTPATIENT)
Age: 30
End: 2025-08-11
Payer: COMMERCIAL

## 2025-08-11 ENCOUNTER — OFFICE VISIT (OUTPATIENT)
Dept: INTERNAL MEDICINE | Facility: CLINIC | Age: 30
End: 2025-08-11
Payer: COMMERCIAL

## 2025-08-11 ENCOUNTER — NURSE TRIAGE (OUTPATIENT)
Dept: CALL CENTER | Facility: HOSPITAL | Age: 30
End: 2025-08-11
Payer: COMMERCIAL

## 2025-08-11 VITALS
WEIGHT: 123 LBS | DIASTOLIC BLOOD PRESSURE: 68 MMHG | SYSTOLIC BLOOD PRESSURE: 98 MMHG | OXYGEN SATURATION: 99 % | RESPIRATION RATE: 16 BRPM | BODY MASS INDEX: 22.63 KG/M2 | HEIGHT: 62 IN | HEART RATE: 94 BPM

## 2025-08-11 VITALS
HEART RATE: 74 BPM | DIASTOLIC BLOOD PRESSURE: 76 MMHG | HEIGHT: 62 IN | OXYGEN SATURATION: 95 % | BODY MASS INDEX: 22.82 KG/M2 | SYSTOLIC BLOOD PRESSURE: 113 MMHG | WEIGHT: 124 LBS

## 2025-08-11 DIAGNOSIS — F31.60 BIPOLAR AFFECTIVE DISORDER, MIXED: Primary | ICD-10-CM

## 2025-08-11 DIAGNOSIS — F51.01 PRIMARY INSOMNIA: ICD-10-CM

## 2025-08-11 DIAGNOSIS — F42.2 MIXED OBSESSIONAL THOUGHTS AND ACTS: ICD-10-CM

## 2025-08-11 DIAGNOSIS — R06.02 SOB (SHORTNESS OF BREATH): Primary | ICD-10-CM

## 2025-08-11 PROCEDURE — 99213 OFFICE O/P EST LOW 20 MIN: CPT | Performed by: INTERNAL MEDICINE

## 2025-08-11 RX ORDER — FLUVOXAMINE MALEATE 100 MG/1
100 CAPSULE, EXTENDED RELEASE ORAL NIGHTLY
Qty: 30 EACH | Refills: 0 | Status: SHIPPED | OUTPATIENT
Start: 2025-08-11

## 2025-08-12 ENCOUNTER — NURSE TRIAGE (OUTPATIENT)
Dept: CALL CENTER | Facility: HOSPITAL | Age: 30
End: 2025-08-12
Payer: COMMERCIAL

## 2025-08-13 ENCOUNTER — NURSE TRIAGE (OUTPATIENT)
Dept: CALL CENTER | Facility: HOSPITAL | Age: 30
End: 2025-08-13
Payer: COMMERCIAL

## 2025-08-14 ENCOUNTER — HOSPITAL ENCOUNTER (OUTPATIENT)
Dept: CARDIOLOGY | Facility: HOSPITAL | Age: 30
Discharge: HOME | End: 2025-08-14
Admitting: INTERNAL MEDICINE
Payer: COMMERCIAL

## 2025-08-14 ENCOUNTER — HOSPITAL ENCOUNTER (EMERGENCY)
Facility: HOSPITAL | Age: 30
Discharge: HOME | End: 2025-08-14
Attending: STUDENT IN AN ORGANIZED HEALTH CARE EDUCATION/TRAINING PROGRAM
Payer: COMMERCIAL

## 2025-08-14 VITALS
HEART RATE: 88 BPM | SYSTOLIC BLOOD PRESSURE: 98 MMHG | DIASTOLIC BLOOD PRESSURE: 70 MMHG | BODY MASS INDEX: 19.44 KG/M2 | HEIGHT: 66 IN | WEIGHT: 121 LBS

## 2025-08-14 VITALS
DIASTOLIC BLOOD PRESSURE: 84 MMHG | WEIGHT: 121 LBS | BODY MASS INDEX: 19.44 KG/M2 | OXYGEN SATURATION: 100 % | HEART RATE: 81 BPM | RESPIRATION RATE: 18 BRPM | HEIGHT: 66 IN | SYSTOLIC BLOOD PRESSURE: 119 MMHG | TEMPERATURE: 99.1 F

## 2025-08-14 DIAGNOSIS — R06.02 SHORT OF BREATH ON EXERTION: ICD-10-CM

## 2025-08-14 DIAGNOSIS — R82.998 DARK URINE: Primary | ICD-10-CM

## 2025-08-14 LAB
ALBUMIN SERPL-MCNC: 4.3 G/DL (ref 3.5–5.2)
ALBUMIN/GLOB SERPL: 2 G/DL
ALP SERPL-CCNC: 51 U/L (ref 39–117)
ALT SERPL W P-5'-P-CCNC: 16 U/L (ref 1–33)
ANION GAP SERPL CALCULATED.3IONS-SCNC: 9.7 MMOL/L (ref 5–15)
AORTIC ARCH: 2.4 CM
AORTIC DIMENSIONLESS INDEX: 0.79 (DI)
ASCENDING AORTA: 2.9 CM
AST SERPL-CCNC: 16 U/L (ref 1–32)
AV MEAN PRESS GRAD SYS DOP V1V2: 4 MMHG
AV VMAX SYS DOP: 129 CM/SEC
B-HCG UR QL: NEGATIVE
BACTERIA UR QL AUTO: NORMAL /HPF
BASOPHILS # BLD AUTO: 0.02 10*3/MM3 (ref 0–0.2)
BASOPHILS NFR BLD AUTO: 0.5 % (ref 0–1.5)
BH CV ECHO LEFT VENTRICLE GLOBAL LONGITUDINAL STRAIN: -24.8 %
BH CV ECHO MEAS - ACS: 2.15 CM
BH CV ECHO MEAS - AO MAX PG: 6.7 MMHG
BH CV ECHO MEAS - AO ROOT DIAM: 3 CM
BH CV ECHO MEAS - AO V2 VTI: 25.3 CM
BH CV ECHO MEAS - AVA(I,D): 2.34 CM2
BH CV ECHO MEAS - EDV(CUBED): 74.1 ML
BH CV ECHO MEAS - EDV(MOD-SP2): 70 ML
BH CV ECHO MEAS - EDV(MOD-SP4): 55 ML
BH CV ECHO MEAS - EF(MOD-SP2): 62.9 %
BH CV ECHO MEAS - EF(MOD-SP4): 65.5 %
BH CV ECHO MEAS - ESV(CUBED): 26.1 ML
BH CV ECHO MEAS - ESV(MOD-SP2): 26 ML
BH CV ECHO MEAS - ESV(MOD-SP4): 19 ML
BH CV ECHO MEAS - FS: 29.4 %
BH CV ECHO MEAS - IVS/LVPW: 1.14 CM
BH CV ECHO MEAS - IVSD: 0.8 CM
BH CV ECHO MEAS - LAT PEAK E' VEL: 10.3 CM/SEC
BH CV ECHO MEAS - LV DIASTOLIC VOL/BSA (35-75): 34 CM2
BH CV ECHO MEAS - LV MASS(C)D: 93 GRAMS
BH CV ECHO MEAS - LV MAX PG: 4.6 MMHG
BH CV ECHO MEAS - LV MEAN PG: 2 MMHG
BH CV ECHO MEAS - LV SYSTOLIC VOL/BSA (12-30): 11.8 CM2
BH CV ECHO MEAS - LV V1 MAX: 107 CM/SEC
BH CV ECHO MEAS - LV V1 VTI: 20.1 CM
BH CV ECHO MEAS - LVIDD: 4.2 CM
BH CV ECHO MEAS - LVIDS: 3 CM
BH CV ECHO MEAS - LVOT AREA: 2.9 CM2
BH CV ECHO MEAS - LVOT DIAM: 1.94 CM
BH CV ECHO MEAS - LVPWD: 0.7 CM
BH CV ECHO MEAS - MED PEAK E' VEL: 10.8 CM/SEC
BH CV ECHO MEAS - MR MAX PG: 10.1 MMHG
BH CV ECHO MEAS - MR MAX VEL: 158.8 CM/SEC
BH CV ECHO MEAS - MV A DUR: 0.1 SEC
BH CV ECHO MEAS - MV A MAX VEL: 66 CM/SEC
BH CV ECHO MEAS - MV DEC SLOPE: 400.9 CM/SEC2
BH CV ECHO MEAS - MV DEC TIME: 0.28 SEC
BH CV ECHO MEAS - MV E MAX VEL: 74.2 CM/SEC
BH CV ECHO MEAS - MV E/A: 1.12
BH CV ECHO MEAS - MV MAX PG: 5.5 MMHG
BH CV ECHO MEAS - MV MEAN PG: 2.02 MMHG
BH CV ECHO MEAS - MV P1/2T: 80.3 MSEC
BH CV ECHO MEAS - MV V2 VTI: 28.9 CM
BH CV ECHO MEAS - MVA(P1/2T): 2.7 CM2
BH CV ECHO MEAS - MVA(VTI): 2.04 CM2
BH CV ECHO MEAS - PA ACC TIME: 0.16 SEC
BH CV ECHO MEAS - PA V2 MAX: 104.5 CM/SEC
BH CV ECHO MEAS - PULM A REVS DUR: 0.1 SEC
BH CV ECHO MEAS - PULM A REVS VEL: 25.7 CM/SEC
BH CV ECHO MEAS - PULM DIAS VEL: 43.2 CM/SEC
BH CV ECHO MEAS - PULM S/D: 1.15
BH CV ECHO MEAS - PULM SYS VEL: 49.5 CM/SEC
BH CV ECHO MEAS - QP/QS: 0.57
BH CV ECHO MEAS - RAP SYSTOLE: 3 MMHG
BH CV ECHO MEAS - RV MAX PG: 2.9 MMHG
BH CV ECHO MEAS - RV V1 MAX: 85.4 CM/SEC
BH CV ECHO MEAS - RV V1 VTI: 18.7 CM
BH CV ECHO MEAS - RVOT DIAM: 1.51 CM
BH CV ECHO MEAS - RVSP: 11.9 MMHG
BH CV ECHO MEAS - SUP REN AO DIAM: 1.8 CM
BH CV ECHO MEAS - SV(LVOT): 59.1 ML
BH CV ECHO MEAS - SV(MOD-SP2): 44 ML
BH CV ECHO MEAS - SV(MOD-SP4): 36 ML
BH CV ECHO MEAS - SV(RVOT): 33.7 ML
BH CV ECHO MEAS - SVI(LVOT): 36.6 ML/M2
BH CV ECHO MEAS - SVI(MOD-SP2): 27.2 ML/M2
BH CV ECHO MEAS - SVI(MOD-SP4): 22.3 ML/M2
BH CV ECHO MEAS - TAPSE (>1.6): 2.11 CM
BH CV ECHO MEAS - TR MAX PG: 8.9 MMHG
BH CV ECHO MEAS - TR MAX VEL: 149.3 CM/SEC
BH CV ECHO MEASUREMENTS AVERAGE E/E' RATIO: 7.03
BH CV XLRA - RV BASE: 2.33 CM
BH CV XLRA - RV LENGTH: 7.1 CM
BH CV XLRA - RV MID: 2.33 CM
BH CV XLRA - TDI S': 13.1 CM/SEC
BILIRUB SERPL-MCNC: 0.3 MG/DL (ref 0–1.2)
BILIRUB UR QL STRIP: NEGATIVE
BUN SERPL-MCNC: 10.7 MG/DL (ref 6–20)
BUN/CREAT SERPL: 15.1 (ref 7–25)
CALCIUM SPEC-SCNC: 9.6 MG/DL (ref 8.6–10.5)
CHLORIDE SERPL-SCNC: 109 MMOL/L (ref 98–107)
CLARITY UR: CLEAR
CO2 SERPL-SCNC: 21.3 MMOL/L (ref 22–29)
COLOR UR: YELLOW
CREAT SERPL-MCNC: 0.71 MG/DL (ref 0.57–1)
DEPRECATED RDW RBC AUTO: 40.4 FL (ref 37–54)
EGFRCR SERPLBLD CKD-EPI 2021: 116.7 ML/MIN/1.73
EOSINOPHIL # BLD AUTO: 0.08 10*3/MM3 (ref 0–0.4)
EOSINOPHIL NFR BLD AUTO: 1.9 % (ref 0.3–6.2)
ERYTHROCYTE [DISTWIDTH] IN BLOOD BY AUTOMATED COUNT: 12.2 % (ref 12.3–15.4)
GLOBULIN UR ELPH-MCNC: 2.1 GM/DL
GLUCOSE SERPL-MCNC: 100 MG/DL (ref 65–99)
GLUCOSE UR STRIP-MCNC: NEGATIVE MG/DL
HCT VFR BLD AUTO: 40 % (ref 34–46.6)
HGB BLD-MCNC: 13.3 G/DL (ref 12–15.9)
HGB UR QL STRIP.AUTO: ABNORMAL
HOLD SPECIMEN: NORMAL
HYALINE CASTS UR QL AUTO: NORMAL /LPF
IMM GRANULOCYTES # BLD AUTO: 0.01 10*3/MM3 (ref 0–0.05)
IMM GRANULOCYTES NFR BLD AUTO: 0.2 % (ref 0–0.5)
KETONES UR QL STRIP: NEGATIVE
LEFT ATRIUM VOLUME INDEX: 15.3 ML/M2
LEUKOCYTE ESTERASE UR QL STRIP.AUTO: NEGATIVE
LV EF BIPLANE MOD: 64.2 %
LYMPHOCYTES # BLD AUTO: 1.7 10*3/MM3 (ref 0.7–3.1)
LYMPHOCYTES NFR BLD AUTO: 40.3 % (ref 19.6–45.3)
MCH RBC QN AUTO: 30 PG (ref 26.6–33)
MCHC RBC AUTO-ENTMCNC: 33.3 G/DL (ref 31.5–35.7)
MCV RBC AUTO: 90.1 FL (ref 79–97)
MONOCYTES # BLD AUTO: 0.54 10*3/MM3 (ref 0.1–0.9)
MONOCYTES NFR BLD AUTO: 12.8 % (ref 5–12)
NEUTROPHILS NFR BLD AUTO: 1.87 10*3/MM3 (ref 1.7–7)
NEUTROPHILS NFR BLD AUTO: 44.3 % (ref 42.7–76)
NITRITE UR QL STRIP: NEGATIVE
PH UR STRIP.AUTO: 7 [PH] (ref 5–8)
PLATELET # BLD AUTO: 199 10*3/MM3 (ref 140–450)
PMV BLD AUTO: 11.5 FL (ref 6–12)
POTASSIUM SERPL-SCNC: 3.7 MMOL/L (ref 3.5–5.2)
PROT SERPL-MCNC: 6.4 G/DL (ref 6–8.5)
PROT UR QL STRIP: NEGATIVE
QT INTERVAL: 386 MS
QTC INTERVAL: 430 MS
RBC # BLD AUTO: 4.44 10*6/MM3 (ref 3.77–5.28)
RBC # UR STRIP: NORMAL /HPF
REF LAB TEST METHOD: NORMAL
SINUS: 2.6 CM
SODIUM SERPL-SCNC: 140 MMOL/L (ref 136–145)
SP GR UR STRIP: 1.01 (ref 1–1.03)
SQUAMOUS #/AREA URNS HPF: NORMAL /HPF
STJ: 2.15 CM
UROBILINOGEN UR QL STRIP: ABNORMAL
WBC # UR STRIP: NORMAL /HPF
WBC NRBC COR # BLD AUTO: 4.22 10*3/MM3 (ref 3.4–10.8)

## 2025-08-14 PROCEDURE — 93306 TTE W/DOPPLER COMPLETE: CPT

## 2025-08-14 PROCEDURE — 93005 ELECTROCARDIOGRAM TRACING: CPT | Performed by: STUDENT IN AN ORGANIZED HEALTH CARE EDUCATION/TRAINING PROGRAM

## 2025-08-14 PROCEDURE — 99283 EMERGENCY DEPT VISIT LOW MDM: CPT | Performed by: STUDENT IN AN ORGANIZED HEALTH CARE EDUCATION/TRAINING PROGRAM

## 2025-08-14 PROCEDURE — 80053 COMPREHEN METABOLIC PANEL: CPT | Performed by: STUDENT IN AN ORGANIZED HEALTH CARE EDUCATION/TRAINING PROGRAM

## 2025-08-14 PROCEDURE — 81025 URINE PREGNANCY TEST: CPT | Performed by: STUDENT IN AN ORGANIZED HEALTH CARE EDUCATION/TRAINING PROGRAM

## 2025-08-14 PROCEDURE — 81001 URINALYSIS AUTO W/SCOPE: CPT | Performed by: STUDENT IN AN ORGANIZED HEALTH CARE EDUCATION/TRAINING PROGRAM

## 2025-08-14 PROCEDURE — 93356 MYOCRD STRAIN IMG SPCKL TRCK: CPT

## 2025-08-14 PROCEDURE — 85025 COMPLETE CBC W/AUTO DIFF WBC: CPT | Performed by: STUDENT IN AN ORGANIZED HEALTH CARE EDUCATION/TRAINING PROGRAM

## 2025-08-14 PROCEDURE — 99213 OFFICE O/P EST LOW 20 MIN: CPT | Performed by: STUDENT IN AN ORGANIZED HEALTH CARE EDUCATION/TRAINING PROGRAM

## 2025-08-14 PROCEDURE — 36415 COLL VENOUS BLD VENIPUNCTURE: CPT

## 2025-08-18 ENCOUNTER — NURSE TRIAGE (OUTPATIENT)
Dept: CALL CENTER | Facility: HOSPITAL | Age: 30
End: 2025-08-18
Payer: COMMERCIAL

## 2025-08-19 ENCOUNTER — HOSPITAL ENCOUNTER (EMERGENCY)
Facility: HOSPITAL | Age: 30
Discharge: HOME | End: 2025-08-19
Attending: EMERGENCY MEDICINE | Admitting: EMERGENCY MEDICINE
Payer: COMMERCIAL

## 2025-08-19 ENCOUNTER — TELEPHONE (OUTPATIENT)
Age: 30
End: 2025-08-19
Payer: COMMERCIAL

## 2025-08-19 DIAGNOSIS — F51.01 PRIMARY INSOMNIA: ICD-10-CM

## 2025-08-19 DIAGNOSIS — F42.2 MIXED OBSESSIONAL THOUGHTS AND ACTS: Primary | ICD-10-CM

## 2025-08-20 RX ORDER — HYDROXYZINE HYDROCHLORIDE 25 MG/1
25 TABLET, FILM COATED ORAL DAILY PRN
Qty: 30 TABLET | Refills: 0 | Status: SHIPPED | OUTPATIENT
Start: 2025-08-20